# Patient Record
Sex: MALE | Race: OTHER | HISPANIC OR LATINO | URBAN - METROPOLITAN AREA
[De-identification: names, ages, dates, MRNs, and addresses within clinical notes are randomized per-mention and may not be internally consistent; named-entity substitution may affect disease eponyms.]

---

## 2017-09-09 ENCOUNTER — INPATIENT (INPATIENT)
Facility: HOSPITAL | Age: 72
LOS: 15 days | Discharge: HOME HEALTH | DRG: 377 | End: 2017-09-25
Attending: INTERNAL MEDICINE | Admitting: INTERNAL MEDICINE
Payer: MEDICARE

## 2017-09-09 VITALS — OXYGEN SATURATION: 100 % | TEMPERATURE: 98 F | RESPIRATION RATE: 18 BRPM

## 2017-09-09 DIAGNOSIS — D50.0 IRON DEFICIENCY ANEMIA SECONDARY TO BLOOD LOSS (CHRONIC): ICD-10-CM

## 2017-09-09 DIAGNOSIS — E78.5 HYPERLIPIDEMIA, UNSPECIFIED: ICD-10-CM

## 2017-09-09 DIAGNOSIS — R56.9 UNSPECIFIED CONVULSIONS: ICD-10-CM

## 2017-09-09 DIAGNOSIS — E11.9 TYPE 2 DIABETES MELLITUS WITHOUT COMPLICATIONS: ICD-10-CM

## 2017-09-09 DIAGNOSIS — R55 SYNCOPE AND COLLAPSE: ICD-10-CM

## 2017-09-09 DIAGNOSIS — Z29.9 ENCOUNTER FOR PROPHYLACTIC MEASURES, UNSPECIFIED: ICD-10-CM

## 2017-09-09 DIAGNOSIS — I10 ESSENTIAL (PRIMARY) HYPERTENSION: ICD-10-CM

## 2017-09-09 LAB
CK MB BLD-MCNC: 1.4 % — SIGNIFICANT CHANGE UP (ref 0–3.5)
CK MB CFR SERPL CALC: 1.8 NG/ML — SIGNIFICANT CHANGE UP (ref 0–3.6)
CK SERPL-CCNC: 128 U/L — SIGNIFICANT CHANGE UP (ref 35–232)
HCT VFR BLD CALC: 34.3 % — LOW (ref 39–50)
HGB BLD-MCNC: 11.8 G/DL — LOW (ref 13–17)
MCHC RBC-ENTMCNC: 29.2 PG — SIGNIFICANT CHANGE UP (ref 27–34)
MCHC RBC-ENTMCNC: 34.5 GM/DL — SIGNIFICANT CHANGE UP (ref 32–36)
MCV RBC AUTO: 84.7 FL — SIGNIFICANT CHANGE UP (ref 80–100)
PLATELET # BLD AUTO: 264 K/UL — SIGNIFICANT CHANGE UP (ref 150–400)
RBC # BLD: 4.04 M/UL — LOW (ref 4.2–5.8)
RBC # FLD: 12.5 % — SIGNIFICANT CHANGE UP (ref 10.3–14.5)
TROPONIN I SERPL-MCNC: <0.015 NG/ML — SIGNIFICANT CHANGE UP (ref 0–0.04)
WBC # BLD: 11.1 K/UL — HIGH (ref 3.8–10.5)
WBC # FLD AUTO: 11.1 K/UL — HIGH (ref 3.8–10.5)

## 2017-09-09 PROCEDURE — 70450 CT HEAD/BRAIN W/O DYE: CPT | Mod: 26

## 2017-09-09 PROCEDURE — 71020: CPT | Mod: 26

## 2017-09-09 PROCEDURE — 99285 EMERGENCY DEPT VISIT HI MDM: CPT

## 2017-09-09 RX ORDER — PANTOPRAZOLE SODIUM 20 MG/1
40 TABLET, DELAYED RELEASE ORAL
Qty: 0 | Refills: 0 | Status: DISCONTINUED | OUTPATIENT
Start: 2017-09-09 | End: 2017-09-22

## 2017-09-09 RX ORDER — ASPIRIN/CALCIUM CARB/MAGNESIUM 324 MG
81 TABLET ORAL DAILY
Qty: 0 | Refills: 0 | Status: DISCONTINUED | OUTPATIENT
Start: 2017-09-09 | End: 2017-09-10

## 2017-09-09 RX ORDER — ENOXAPARIN SODIUM 100 MG/ML
40 INJECTION SUBCUTANEOUS DAILY
Qty: 0 | Refills: 0 | Status: DISCONTINUED | OUTPATIENT
Start: 2017-09-09 | End: 2017-09-10

## 2017-09-09 RX ORDER — SODIUM CHLORIDE 9 MG/ML
1000 INJECTION INTRAMUSCULAR; INTRAVENOUS; SUBCUTANEOUS
Qty: 0 | Refills: 0 | Status: DISCONTINUED | OUTPATIENT
Start: 2017-09-09 | End: 2017-09-10

## 2017-09-09 RX ORDER — SITAGLIPTIN AND METFORMIN HYDROCHLORIDE 500; 50 MG/1; MG/1
1 TABLET, FILM COATED ORAL
Qty: 0 | Refills: 0 | COMMUNITY

## 2017-09-09 RX ORDER — INSULIN LISPRO 100/ML
VIAL (ML) SUBCUTANEOUS
Qty: 0 | Refills: 0 | Status: DISCONTINUED | OUTPATIENT
Start: 2017-09-09 | End: 2017-09-10

## 2017-09-09 RX ORDER — METFORMIN HYDROCHLORIDE 850 MG/1
1000 TABLET ORAL
Qty: 0 | Refills: 0 | Status: DISCONTINUED | OUTPATIENT
Start: 2017-09-09 | End: 2017-09-10

## 2017-09-09 RX ORDER — SIMVASTATIN 20 MG/1
1 TABLET, FILM COATED ORAL
Qty: 0 | Refills: 0 | COMMUNITY

## 2017-09-09 RX ORDER — ATORVASTATIN CALCIUM 80 MG/1
40 TABLET, FILM COATED ORAL AT BEDTIME
Qty: 0 | Refills: 0 | Status: DISCONTINUED | OUTPATIENT
Start: 2017-09-09 | End: 2017-09-25

## 2017-09-09 RX ADMIN — Medication 10 MILLIGRAM(S): at 18:53

## 2017-09-09 RX ADMIN — ATORVASTATIN CALCIUM 40 MILLIGRAM(S): 80 TABLET, FILM COATED ORAL at 23:25

## 2017-09-09 NOTE — H&P ADULT - ASSESSMENT
71M from home, still works PMH of DM, HTN, Kidney stones, HLD who presents to hospital for syncope, possible seizure while on the way to work and episode of bloody stool incontinence. Patient is being admitted for workup of syncope, rule out of seizure and anemia due to lower GI bleed x2 episodes today

## 2017-09-09 NOTE — H&P ADULT - HISTORY OF PRESENT ILLNESS
71M from home, still works PMH of DM, HTN, Kidney stones, HLD who presents to hospital for syncope, possible seizure while on the way to work and episode of bloody stool incontinence. Patient says he was on his way to work in the morning, on the train going to [employer] and when he was getting off, he suddenly blacked out. As per friend who was with him, he was unconscious for 10 mins and when he woke up, he was very dizzy and had episode of stool incontinence that was very bloody. His friend told him that he had no convulsions, no period of confusion when he woke up, no tongue biding but that he was very sweaty. Patient denies any chest pain before, during or after the event. Friend got him to work, gave him change of clothes and then took him here to the hospital. 71M from home, still works PMH of DM, HTN, Kidney stones, HLD who presents to hospital for syncope, possible seizure while on the way to work and episode of bloody stool incontinence. Patient says he was on his way to work in the morning, on the train going to [employer] and when he was getting off, he suddenly blacked out. As per friend who was with him, he was unconscious for 10 mins and when he woke up, he was very dizzy and had episode of stool incontinence that was very bloody. His friend told him that he had no convulsions, no period of confusion when he woke up, no tongue biting but that he was very sweaty. Patient denies any chest pain before, during or after the event. Friend got him to work, gave him change of clothes and then took him here to the hospital.

## 2017-09-09 NOTE — H&P ADULT - NSHPREVIEWOFSYSTEMS_GEN_ALL_CORE
REVIEW OF SYSTEMS:  CONSTITUTIONAL: No fever, weight loss, or fatigue  EYES: No eye pain, visual disturbances, or discharge  ENMT:  No difficulty hearing, tinnitus, vertigo; No sinus or throat pain  NECK: No pain or stiffness  RESPIRATORY: No cough, wheezing, chills or hemoptysis; No shortness of breath  CARDIOVASCULAR: syncope, dizziness - no chest pain, no palpitations  GASTROINTESTINAL: fecal incontinence with bloody bowel movement  GENITOURINARY: No dysuria, frequency, hematuria, or incontinence  NEUROLOGICAL: No headaches, memory loss, loss of strength, numbness, or tremors  SKIN: No itching, burning, rashes, or lesions   LYMPH NODES: No enlarged glands  ENDOCRINE: No heat or cold intolerance; No hair loss  MUSCULOSKELETAL: No joint pain or swelling; No muscle, back, or extremity pain  PSYCHIATRIC: No depression, anxiety, mood swings, or difficulty sleeping  HEME/LYMPH: No easy bruising, or bleeding gums  ALLERY AND IMMUNOLOGIC: No hives or eczema

## 2017-09-09 NOTE — ED PROVIDER NOTE - MEDICAL DECISION MAKING DETAILS
72 y/o male with syncopal episode. Will check labs, EKG. Possible admission  2/2 recurrent syncopal events.

## 2017-09-09 NOTE — ED PROVIDER NOTE - OBJECTIVE STATEMENT
72 y/o male with a significant PMHx of DM, HTN and HLD presents to ED, BIB daughter s/p syncopal episode with fecal incontinence today. Daughter notes the pt was on his way to work when he fainted, witnessed by friend who is at bedside, denies head injury. Friend notes the pt was diaphoretic but did not appear to be shaking. Daughter states the pt has had syncopal episode once in the past with urinary incontinence. Pt is awake and alert currently while in ED. He denies any CP, SOB, fever or any other complaints at this time.

## 2017-09-09 NOTE — H&P ADULT - PROBLEM SELECTOR PLAN 2
Patient has concern for seizure since patient passed out and had incontinence  -however it would be atypical as patient had no convulsions, no postictal confusion  -will get EEG to evaluation  -no seizure medications for now  -Neuro consult Patient has concern for seizure since patient passed out and had incontinence  -however it would be atypical as patient had no convulsions, no postictal confusion  -will get EEG to evaluation  -no seizure medications for now

## 2017-09-09 NOTE — H&P ADULT - NSHPLABSRESULTS_GEN_ALL_CORE
EKG shows normal sinus rhythm at rate of 69 without ischemic changes in ST or T waves  CT head negative  CXR clear  UA clear  Trop 1 negative  Lytes wnl  Cr 1.18  Initial Hb 13, second repeat was 11.8  Had positive guaiac and bloody bm witnessed by ED

## 2017-09-09 NOTE — H&P ADULT - PROBLEM SELECTOR PLAN 4
Patient is only on oral medications but has elevated glucose on BMP  -will give metformin for now  -give HSS during meals  -consistent carbohydrate meals  -F/u A1c and monitor FS  -adjust insulin and meds as necessary to improve diabetic control if warranted

## 2017-09-09 NOTE — H&P ADULT - PROBLEM SELECTOR PLAN 6
Will change home simvastatin to lipitor for Syncope/seizure rule out  -f/u lipid panel for evaluation of control

## 2017-09-09 NOTE — H&P ADULT - ATTENDING COMMENTS
71 yr old male  h/o htn, dm had an episode of syncope and passsed out for a few mnts, didn't have cp or sob or palp. then he woke up brought to later on had bright red blood from rectum.  Er called me advised ICU but er didn't feel pt is not sick qs hgb was 13 plus.  I came to see him in er he was alert awake not feeling dizzy. mild abd discomfort. later on d/w resident asked check cbc and any orthostasis  call icu.    today pt doing ok. no bm so no rectal bleed. no abd pain.  GI pending. pt is not on NSAID, no etoh.  already has TXC  hgb from 13 to 11

## 2017-09-09 NOTE — ED ADULT NURSE NOTE - OBJECTIVE STATEMENT
pt arrived with family c/o syncope and loss of bowels x today no seizure activity noted , bloody BM noted - MD made aware , labs collected and sent , heplock to LT AC 20G

## 2017-09-09 NOTE — H&P ADULT - PROBLEM SELECTOR PLAN 3
Patient had 2 episodes of what is assumed to be lower GI bleed with bloody stool   -guaiac positive  -T&S tested but currently H/H is over 10 so will not transfuse  -give IV fluids to replete volume  -F/u Anemia panel  -Will need Gi consult and scopes for evaluation of source of bleeding  -monitor H/H and transfuse for Hb < 7 or if symptomatic

## 2017-09-09 NOTE — H&P ADULT - NSHPPHYSICALEXAM_GEN_ALL_CORE
Vital Signs Last 24 Hrs  T(C): 37.1 (09 Sep 2017 16:02), Max: 37.1 (09 Sep 2017 16:02)  T(F): 98.7 (09 Sep 2017 16:02), Max: 98.7 (09 Sep 2017 16:02)  HR: 90 (09 Sep 2017 16:02) (90 - 90)  BP: 118/72 (09 Sep 2017 16:02) (118/72 - 118/72)  BP(mean): --  RR: 18 (09 Sep 2017 16:02) (18 - 18)  SpO2: 100% (09 Sep 2017 16:02) (100% - 100%)    Orthostatics  Lyin/74  Sittin/80  Standin/77    GENERAL: NAD, well-groomed, well-developed  HEAD:  Atraumatic, Normocephalic  EYES: EOMI, PERRLA, conjunctiva and sclera clear  ENMT: No tonsillar erythema, exudates, or enlargement; Moist mucous membranes  NECK: Supple, No JVD, Normal thyroid  NERVOUS SYSTEM:  Alert & Oriented X3, power 5/5 in bilateral upper and low extremities, CN2-12 intact bilaterally  CHEST/LUNG: Clear to auscultation bilaterally; No rales, rhonchi, wheezing, or rubs  HEART: Regular rate and rhythm; No murmurs, rubs, or gallops  ABDOMEN: Soft, Nontender, Nondistended; Bowel sounds present  EXTREMITIES:  2+ Peripheral Pulses, No clubbing, cyanosis, or edema  LYMPH: No lymphadenopathy noted  SKIN: No rashes or lesions

## 2017-09-09 NOTE — H&P ADULT - PROBLEM SELECTOR PLAN 1
Patient had syncope and collapsed for 10 mins - unknown source at the moment  -Orthostatics normal  -possibilities are Vasovagal, arrhythmia mediated, Seizure, or neurological event (TIA vs CVA)  -neuro is unlikely as patient has pristine neurological exam and doesn't have confusion  -will keep on tele to monitor for arrhythmias  -Will get Echocardiogram to evaluate for LV function  -Seizure workup as per below  -Cardio consult  -will give aspirin and lipitor for now  -trend cardiac enzymes Patient had syncope and collapsed for 10 mins - unknown source at the moment  -Orthostatics normal  -possibilities are Vasovagal, arrhythmia mediated, Seizure, or neurological event (TIA vs CVA)  -neuro is unlikely as patient has pristine neurological exam and doesn't have confusion - will get MRI/MRA for evaluation  -will keep on tele to monitor for arrhythmias  -Will get Echocardiogram to evaluate for LV function  -Seizure workup as per below  -Cardio consult  -will give aspirin and lipitor for now  -trend cardiac enzymes

## 2017-09-10 DIAGNOSIS — E11.9 TYPE 2 DIABETES MELLITUS WITHOUT COMPLICATIONS: ICD-10-CM

## 2017-09-10 DIAGNOSIS — R57.1 HYPOVOLEMIC SHOCK: ICD-10-CM

## 2017-09-10 DIAGNOSIS — K92.2 GASTROINTESTINAL HEMORRHAGE, UNSPECIFIED: ICD-10-CM

## 2017-09-10 LAB
ANION GAP SERPL CALC-SCNC: 6 MMOL/L — SIGNIFICANT CHANGE UP (ref 5–17)
ANION GAP SERPL CALC-SCNC: 8 MMOL/L — SIGNIFICANT CHANGE UP (ref 5–17)
APTT BLD: 24.9 SEC — LOW (ref 27.5–37.4)
BUN SERPL-MCNC: 16 MG/DL — SIGNIFICANT CHANGE UP (ref 7–18)
BUN SERPL-MCNC: 19 MG/DL — HIGH (ref 7–18)
CALCIUM SERPL-MCNC: 8.4 MG/DL — SIGNIFICANT CHANGE UP (ref 8.4–10.5)
CALCIUM SERPL-MCNC: 8.7 MG/DL — SIGNIFICANT CHANGE UP (ref 8.4–10.5)
CHLORIDE SERPL-SCNC: 106 MMOL/L — SIGNIFICANT CHANGE UP (ref 96–108)
CHLORIDE SERPL-SCNC: 108 MMOL/L — SIGNIFICANT CHANGE UP (ref 96–108)
CHOLEST SERPL-MCNC: 105 MG/DL — SIGNIFICANT CHANGE UP (ref 10–199)
CK MB BLD-MCNC: <0.9 % — SIGNIFICANT CHANGE UP (ref 0–3.5)
CK MB BLD-MCNC: <1.1 % — SIGNIFICANT CHANGE UP (ref 0–3.5)
CK MB CFR SERPL CALC: <1 NG/ML — SIGNIFICANT CHANGE UP (ref 0–3.6)
CK MB CFR SERPL CALC: <1 NG/ML — SIGNIFICANT CHANGE UP (ref 0–3.6)
CK SERPL-CCNC: 116 U/L — SIGNIFICANT CHANGE UP (ref 35–232)
CK SERPL-CCNC: 92 U/L — SIGNIFICANT CHANGE UP (ref 35–232)
CO2 SERPL-SCNC: 23 MMOL/L — SIGNIFICANT CHANGE UP (ref 22–31)
CO2 SERPL-SCNC: 27 MMOL/L — SIGNIFICANT CHANGE UP (ref 22–31)
CREAT SERPL-MCNC: 1.02 MG/DL — SIGNIFICANT CHANGE UP (ref 0.5–1.3)
CREAT SERPL-MCNC: 1.15 MG/DL — SIGNIFICANT CHANGE UP (ref 0.5–1.3)
FERRITIN SERPL-MCNC: 76 NG/ML — SIGNIFICANT CHANGE UP (ref 30–400)
FOLATE SERPL-MCNC: 11.4 NG/ML — SIGNIFICANT CHANGE UP (ref 4.8–24.2)
GLUCOSE SERPL-MCNC: 158 MG/DL — HIGH (ref 70–99)
GLUCOSE SERPL-MCNC: 166 MG/DL — HIGH (ref 70–99)
HBA1C BLD-MCNC: 7 % — HIGH (ref 4–5.6)
HCT VFR BLD CALC: 29.5 % — LOW (ref 39–50)
HCT VFR BLD CALC: 30.2 % — LOW (ref 39–50)
HCT VFR BLD CALC: 30.9 % — LOW (ref 39–50)
HDLC SERPL-MCNC: 31 MG/DL — LOW (ref 40–125)
HGB BLD-MCNC: 10.3 G/DL — LOW (ref 13–17)
HGB BLD-MCNC: 10.7 G/DL — LOW (ref 13–17)
HGB BLD-MCNC: 10.8 G/DL — LOW (ref 13–17)
INR BLD: 1.22 RATIO — HIGH (ref 0.88–1.16)
IRON SATN MFR SERPL: 31 % — SIGNIFICANT CHANGE UP (ref 20–55)
IRON SATN MFR SERPL: 87 UG/DL — SIGNIFICANT CHANGE UP (ref 65–170)
LACTATE SERPL-SCNC: 4 MMOL/L — CRITICAL HIGH (ref 0.7–2)
LDH SERPL L TO P-CCNC: 108 U/L — LOW (ref 120–225)
LIPID PNL WITH DIRECT LDL SERPL: 47 MG/DL — SIGNIFICANT CHANGE UP
MAGNESIUM SERPL-MCNC: 1.9 MG/DL — SIGNIFICANT CHANGE UP (ref 1.6–2.6)
MCHC RBC-ENTMCNC: 29.9 PG — SIGNIFICANT CHANGE UP (ref 27–34)
MCHC RBC-ENTMCNC: 30.3 PG — SIGNIFICANT CHANGE UP (ref 27–34)
MCHC RBC-ENTMCNC: 30.8 PG — SIGNIFICANT CHANGE UP (ref 27–34)
MCHC RBC-ENTMCNC: 35 GM/DL — SIGNIFICANT CHANGE UP (ref 32–36)
MCHC RBC-ENTMCNC: 35 GM/DL — SIGNIFICANT CHANGE UP (ref 32–36)
MCHC RBC-ENTMCNC: 35.3 GM/DL — SIGNIFICANT CHANGE UP (ref 32–36)
MCV RBC AUTO: 85.6 FL — SIGNIFICANT CHANGE UP (ref 80–100)
MCV RBC AUTO: 86.5 FL — SIGNIFICANT CHANGE UP (ref 80–100)
MCV RBC AUTO: 87.2 FL — SIGNIFICANT CHANGE UP (ref 80–100)
PHOSPHATE SERPL-MCNC: 2.5 MG/DL — SIGNIFICANT CHANGE UP (ref 2.5–4.5)
PLATELET # BLD AUTO: 232 K/UL — SIGNIFICANT CHANGE UP (ref 150–400)
PLATELET # BLD AUTO: 235 K/UL — SIGNIFICANT CHANGE UP (ref 150–400)
PLATELET # BLD AUTO: 249 K/UL — SIGNIFICANT CHANGE UP (ref 150–400)
POTASSIUM SERPL-MCNC: 3.6 MMOL/L — SIGNIFICANT CHANGE UP (ref 3.5–5.3)
POTASSIUM SERPL-MCNC: 4.1 MMOL/L — SIGNIFICANT CHANGE UP (ref 3.5–5.3)
POTASSIUM SERPL-SCNC: 3.6 MMOL/L — SIGNIFICANT CHANGE UP (ref 3.5–5.3)
POTASSIUM SERPL-SCNC: 4.1 MMOL/L — SIGNIFICANT CHANGE UP (ref 3.5–5.3)
PROTHROM AB SERPL-ACNC: 13.4 SEC — HIGH (ref 9.8–12.7)
RBC # BLD: 3.43 M/UL — LOW (ref 4.2–5.8)
RBC # BLD: 3.45 M/UL — LOW (ref 4.2–5.8)
RBC # BLD: 3.46 M/UL — LOW (ref 4.2–5.8)
RBC # BLD: 3.57 M/UL — LOW (ref 4.2–5.8)
RBC # FLD: 12.2 % — SIGNIFICANT CHANGE UP (ref 10.3–14.5)
RBC # FLD: 12.4 % — SIGNIFICANT CHANGE UP (ref 10.3–14.5)
RBC # FLD: 12.4 % — SIGNIFICANT CHANGE UP (ref 10.3–14.5)
RETICS #: 54.4 K/UL — SIGNIFICANT CHANGE UP (ref 25–125)
RETICS/RBC NFR: 1.6 % — SIGNIFICANT CHANGE UP (ref 0.5–2.5)
SODIUM SERPL-SCNC: 139 MMOL/L — SIGNIFICANT CHANGE UP (ref 135–145)
SODIUM SERPL-SCNC: 139 MMOL/L — SIGNIFICANT CHANGE UP (ref 135–145)
TIBC SERPL-MCNC: 279 UG/DL — SIGNIFICANT CHANGE UP (ref 250–450)
TOTAL CHOLESTEROL/HDL RATIO MEASUREMENT: 3.4 RATIO — SIGNIFICANT CHANGE UP (ref 3.4–9.6)
TRIGL SERPL-MCNC: 135 MG/DL — SIGNIFICANT CHANGE UP (ref 10–149)
TROPONIN I SERPL-MCNC: <0.015 NG/ML — SIGNIFICANT CHANGE UP (ref 0–0.04)
TROPONIN I SERPL-MCNC: <0.015 NG/ML — SIGNIFICANT CHANGE UP (ref 0–0.04)
TSH SERPL-MCNC: 0.49 UU/ML — SIGNIFICANT CHANGE UP (ref 0.34–4.82)
UIBC SERPL-MCNC: 192 UG/DL — SIGNIFICANT CHANGE UP (ref 110–370)
VIT B12 SERPL-MCNC: 385 PG/ML — SIGNIFICANT CHANGE UP (ref 243–894)
WBC # BLD: 10.7 K/UL — HIGH (ref 3.8–10.5)
WBC # BLD: 9.9 K/UL — SIGNIFICANT CHANGE UP (ref 3.8–10.5)
WBC # BLD: 9.9 K/UL — SIGNIFICANT CHANGE UP (ref 3.8–10.5)
WBC # FLD AUTO: 10.7 K/UL — HIGH (ref 3.8–10.5)
WBC # FLD AUTO: 9.9 K/UL — SIGNIFICANT CHANGE UP (ref 3.8–10.5)
WBC # FLD AUTO: 9.9 K/UL — SIGNIFICANT CHANGE UP (ref 3.8–10.5)

## 2017-09-10 PROCEDURE — 99291 CRITICAL CARE FIRST HOUR: CPT

## 2017-09-10 PROCEDURE — 71010: CPT | Mod: 26

## 2017-09-10 RX ORDER — SODIUM CHLORIDE 9 MG/ML
1000 INJECTION, SOLUTION INTRAVENOUS
Qty: 0 | Refills: 0 | Status: DISCONTINUED | OUTPATIENT
Start: 2017-09-10 | End: 2017-09-12

## 2017-09-10 RX ORDER — SODIUM CHLORIDE 9 MG/ML
1000 INJECTION INTRAMUSCULAR; INTRAVENOUS; SUBCUTANEOUS
Qty: 0 | Refills: 0 | Status: DISCONTINUED | OUTPATIENT
Start: 2017-09-10 | End: 2017-09-10

## 2017-09-10 RX ORDER — SODIUM CHLORIDE 9 MG/ML
500 INJECTION INTRAMUSCULAR; INTRAVENOUS; SUBCUTANEOUS ONCE
Qty: 0 | Refills: 0 | Status: COMPLETED | OUTPATIENT
Start: 2017-09-10 | End: 2017-09-10

## 2017-09-10 RX ORDER — ONDANSETRON 8 MG/1
8 TABLET, FILM COATED ORAL ONCE
Qty: 0 | Refills: 0 | Status: COMPLETED | OUTPATIENT
Start: 2017-09-10 | End: 2017-09-10

## 2017-09-10 RX ORDER — INSULIN LISPRO 100/ML
VIAL (ML) SUBCUTANEOUS EVERY 6 HOURS
Qty: 0 | Refills: 0 | Status: DISCONTINUED | OUTPATIENT
Start: 2017-09-10 | End: 2017-09-23

## 2017-09-10 RX ADMIN — ENOXAPARIN SODIUM 40 MILLIGRAM(S): 100 INJECTION SUBCUTANEOUS at 11:40

## 2017-09-10 RX ADMIN — SODIUM CHLORIDE 2000 MILLILITER(S): 9 INJECTION INTRAMUSCULAR; INTRAVENOUS; SUBCUTANEOUS at 20:28

## 2017-09-10 RX ADMIN — Medication 81 MILLIGRAM(S): at 11:40

## 2017-09-10 RX ADMIN — Medication 10 MILLIGRAM(S): at 06:23

## 2017-09-10 RX ADMIN — Medication 1: at 09:24

## 2017-09-10 RX ADMIN — ONDANSETRON 8 MILLIGRAM(S): 8 TABLET, FILM COATED ORAL at 20:28

## 2017-09-10 RX ADMIN — SODIUM CHLORIDE 100 MILLILITER(S): 9 INJECTION INTRAMUSCULAR; INTRAVENOUS; SUBCUTANEOUS at 21:34

## 2017-09-10 RX ADMIN — PANTOPRAZOLE SODIUM 40 MILLIGRAM(S): 20 TABLET, DELAYED RELEASE ORAL at 06:21

## 2017-09-10 RX ADMIN — METFORMIN HYDROCHLORIDE 1000 MILLIGRAM(S): 850 TABLET ORAL at 09:25

## 2017-09-10 RX ADMIN — Medication 1: at 13:04

## 2017-09-10 RX ADMIN — SODIUM CHLORIDE 100 MILLILITER(S): 9 INJECTION, SOLUTION INTRAVENOUS at 23:23

## 2017-09-10 RX ADMIN — Medication 10 MILLIGRAM(S): at 17:28

## 2017-09-10 RX ADMIN — PANTOPRAZOLE SODIUM 40 MILLIGRAM(S): 20 TABLET, DELAYED RELEASE ORAL at 17:28

## 2017-09-10 NOTE — CHART NOTE - NSCHARTNOTEFT_GEN_A_CORE
RRT was called on pt at 8:25 pm today as he had bright red bloody bowel movement and syncopized in the bathroom. Pt was held by family member and hospital staff so no fall or trauma occurred. Pt seen and examined. He looked pale and diaphoretic. T-99, /66, HR 80, RR 16, SpO2 98% on RA. 2 litres NS bolus was given right away and pt is AAOX3 and responded in few seconds and was transferred to bed. He denies any abd pain or other symptoms. Repeat CBC was 10.3 dropped from 10.8 in AM. GI Dr. Villavicencio and Attending were notified about RRT. Attending requested for ICU consult which was consulted. Blood transfusion consent and all labs were obtained. Discussed plan with family and pt at bedside. Will monitor CBC and vitals. Pt is stable for now. RRT was called on pt at 8:25 pm today as he had bright red bloody bowel movement and syncopized in the bathroom. Pt was held by family member and hospital staff so no fall or trauma occurred. Pt seen and examined. He looked pale and diaphoretic. T-99, /66, HR 80, RR 16, SpO2 98% on RA. 2 litres NS bolus was given right away and pt is AAOX3 and responded in few seconds and was transferred to bed. He denies any abd pain or other symptoms. Repeat CBC was 10.3 dropped from 10.8 in AM. GI Dr. Villavicencio and Attending were notified about RRT. Attending requested for ICU consult which was consulted. Blood transfusion consent and all labs were obtained. Discussed plan with family and pt at bedside. Will monitor CBC and vitals. Pt is stable for now. Will f/u CT Abd/pelvis. RRT was called on pt at 8:25 pm today as he had bright red bloody bowel movement and syncopized in the bathroom. Pt was held by family member and hospital staff so no fall or trauma occurred. Pt seen and examined. He looked pale and diaphoretic likely vasovagal syncope as pt having bowel movement or sec to Lower GI bleed. T-99, /66, HR 80, RR 16, SpO2 98% on RA. 2 litres NS bolus was given right away and pt is AAOX3 and responded in few seconds and was transferred to bed. He denies any abd pain or other symptoms. Repeat CBC was 10.3 dropped from 10.8 in AM. GI Dr. Villavicencio and Attending were notified about RRT. Attending requested for ICU consult which was consulted. Blood transfusion consent and all labs were obtained. Discussed plan with family and pt at bedside. Will monitor CBC and vitals. Pt is stable for now. Will f/u CT Abd/pelvis. Will need Colonoscopy and/ EGD. RRT was called on pt at 8:25 pm today as he had bright red bloody bowel movement and syncopized in the bathroom. Pt was held by family member and hospital staff so no fall or trauma occurred. Pt seen and examined. He looked pale and diaphoretic likely vasovagal syncope as pt having bowel movement or sec to Lower GI bleed. T-99, /66, HR 80, RR 16, SpO2 98% on RA. 2 litres NS bolus was given right away and pt is AAOX3 and responded in few seconds and was transferred to bed. He denies any abd pain or other symptoms. Repeat CBC was 10.3 dropped from 10.8 in AM. GI Dr. Villavicencio and Attending were notified about RRT. Attending requested for ICU consult which was consulted. Blood transfusion consent and all labs were obtained. Discussed plan with family and pt at bedside. Will monitor CBC and vitals. Pt is stable for now. Will f/u CT Abd/pelvis. Will need Colonoscopy and/ EGD.    Addendum - Pt had another RRT at 9:45 pm for similar episode of bright red bloody bowel movement and hypotension to 80s, 1 litre bolus which is 3rd litre running and 1 unit pRBC ordered. ICU accepted the pt as he is hemodynamically unstable sec to active Lower GI bleed. Attending and GI made aware.

## 2017-09-10 NOTE — CHART NOTE - NSCHARTNOTEFT_GEN_A_CORE
Spoke to the surgery house officer ( Dr Adamson) about the lower GI Bleed, recommended that patient needs the Bleeding scan , if positive the needs the CT Angiogram. Spoke to the surgery house officer ( Dr Adamson) about the lower GI Bleed, recommended that patient needs the Bleeding scan , if positive then needs the CT Angiogram.

## 2017-09-10 NOTE — CONSULT NOTE ADULT - PROBLEM SELECTOR RECOMMENDATION 9
Admitted to ICU  Fluid Resuscitation  Blood transfusion as needed  Repeat CBC serially  GI Consult  Discussed with Dr Stratton  Obtain CT Angio

## 2017-09-10 NOTE — CONSULT NOTE ADULT - PROBLEM SELECTOR PROBLEM 2
Syncope and collapse Type 2 diabetes mellitus without complication, unspecified long term insulin use status

## 2017-09-10 NOTE — CONSULT NOTE ADULT - PROBLEM SELECTOR PROBLEM 3
Type 2 diabetes mellitus without complication, unspecified long term insulin use status Need for prophylactic measure

## 2017-09-10 NOTE — CONSULT NOTE ADULT - PROBLEM SELECTOR RECOMMENDATION 9
Pt has Hemorrhagic Shock and Syncope with hypotension sec to Active Lower GI bleed from Diverticulosis?  s/p 2 RRTS today for the same  H/H 10.3/29.5 dropped from 13 on admission and 10.8 this AM  s/p 3 lt NS boluses, and BP improved from 80s to 110s now.  Will repeat CBC q6  Got 2 peripheral IV lines  Giving 1 unit pRBC, transfuse more as needed based on CBC  GI Dr. Gill consulted, also consulted GI Dr. Montague as he is on call tonight as per Dr. Gill.  Will keep on Protonix iv bid  NPO except meds  Will need Colonoscopy and/EGD  holding ASA  f/u CT abd/pelvis to look for source bleed  ICU accepted the pt as pt is hemodynamically unstable  Central line consent taken which will be placed  Discussed plan with pt s family, Intensivist Pt has Hemorrhagic Shock and Syncope with hypotension sec to Active Lower GI bleed from Diverticulosis?  s/p 2 RRTS today for the same  H/H 10.3/29.5 dropped from 13 on admission and 10.8 this AM  s/p 3 lt NS boluses, and BP improved from 80s to 110s now.  Will repeat CBC q6  Got 2 peripheral IV lines  Giving 1 unit pRBC, transfuse more as needed based on CBC  GI Dr. Gill consulted, also consulted GI Dr. Montague as he is on call tonight as per Dr. Gill. Dr. Montague advised CTA abd/pelvis and Surgery consult.  Will keep on Protonix iv bid  NPO except meds  Will need Colonoscopy and/EGD  holding ASA  f/u CT abd/pelvis to look for source bleed  ICU accepted the pt as pt is hemodynamically unstable  Central line consent taken which will be placed  Consulted surgery- advised to get CTA Abd and Bleeding scan  Discussed plan with pt s family, Intensivist Pt has Hemorrhagic Shock and Syncope with hypotension sec to Active Lower GI bleed from Diverticulosis?  s/p 2 RRTs today for the same  H/H 10.3/29.5 dropped from 13 on admission and 10.8 this AM  s/p 3 lt NS boluses, and BP improved from 80s to 110s now.  Will repeat CBC q6  Lactate 4, f/u repeat lactate  Got 2 peripheral IV lines  Giving 1 unit pRBC, transfuse more as needed based on CBC  GI Dr. Gill consulted, also consulted GI Dr. Montague as he is on call tonight as per Dr. Gill. Dr. Montague advised CTA abd/pelvis and Surgery consult.  Will keep on Protonix iv bid  NPO and D5 NS @ 100cc/hr for hydration  Will need Colonoscopy and/EGD  holding ASA  f/u CT abd/pelvis to look for source bleed  ICU accepted the pt as pt is hemodynamically unstable  Central line consent taken which will be placed  Consulted surgery- advised to get CTA Abd and Bleeding scan  Discussed plan with pt s family, Intensivist

## 2017-09-10 NOTE — CONSULT NOTE ADULT - SUBJECTIVE AND OBJECTIVE BOX
71M from home, still works PMH of DM, HTN, Kidney stones, HLD who presents to hospital for syncope, possible seizure while on the way to work and episode of bloody stool incontinence. Patient says he was on his way to work in the morning, on the train going to [employer] and when he was getting off, he suddenly blacked out. As per friend who was with him, he was unconscious for 10 mins and when he woke up, he was very dizzy and had episode of stool incontinence that was very bloody. His friend told him that he had no convulsions, no period of confusion when he woke up, no tongue biting but that he was very sweaty. Patient denies any chest pain before, during or after the event. Friend got him to work, gave him change of clothes and then took him here to the hospital. (09 Sep 2017 17:45)      BRIEF HOSPITAL COURSE:     RRT was called on pt at 8:25 pm today as he had bright red bloody bowel movement and syncopized in the bathroom. Pt was held by family member and hospital staff so no fall or trauma occurred. Pt seen and examined. He looked pale and diaphoretic likely vasovagal syncope as pt having bowel movement or sec to Lower GI bleed. T-99, /66, HR 80, RR 16, SpO2 98% on RA. 2 litres NS bolus was given right away and pt is AAOX3 and responded in few seconds and was transferred to bed. He denies any abd pain or other symptoms. Repeat CBC was 10.3 dropped from 10.8 in AM and on admission it was 13. GI Dr. Villavicencio and Attending were notified about RRT. Attending requested for ICU consult which was consulted. Blood transfusion consent and all labs were obtained. Discussed plan with family and pt at bedside. Will monitor CBC and vitals. Pt is stable for now. Will f/u CT Abd/pelvis. Will need Colonoscopy and/ EGD.    Addendum - Pt had another RRT at 9:45 pm for similar episode of bright red bloody bowel movement and hypotension to 80s, 1 litre bolus which is 3rd litre running and 1 unit pRBC ordered. ICU accepted the pt as he is hemodynamically unstable sec to active Lower GI bleed. Attending and GI made aware.    	    PAST MEDICAL & SURGICAL HISTORY:  Kidney stones  Hyperlipidemia  Hypertension  Diabetes  No significant past surgical history      MEDICATIONS  (STANDING):  atorvastatin 40 milliGRAM(s) Oral at bedtime  pantoprazole  Injectable 40 milliGRAM(s) IV Push two times a day  dextrose 5% + sodium chloride 0.9%. 1000 milliLiter(s) (100 mL/Hr) IV Continuous <Continuous>  insulin lispro (HumaLOG) corrective regimen sliding scale   SubCutaneous every 6 hours    MEDICATIONS  (PRN):      Allergies    No Known Allergies    Intolerances        SOCIAL HISTORY:  No drug use    FAMILY HISTORY:  No pertinent family history in first degree relatives      REVIEW OF SYSTEMS:  CONSTITUTIONAL: In no acute distress.  EYES: No eye pain, visual disturbances, or discharge  ENMT:  No difficulty hearing, tinnitus, vertigo; No sinus or throat pain  NECK: No pain or stiffness  BREASTS: No pain, masses, or nipple discharge  RESPIRATORY: No cough, wheezing, chills or hemoptysis; No shortness of breath  CARDIOVASCULAR: No chest pain, palpitations, dizziness, or leg swelling  GASTROINTESTINAL: No abdominal or epigastric pain. No nausea, vomiting, or hematemesis; No diarrhea or constipation. No melena or hematochezia.  GENITOURINARY: No dysuria, frequency, hematuria, or incontinence  NEUROLOGICAL: No headaches, memory loss, loss of strength, numbness, or tremors  SKIN: No itching, burning, rashes, or lesions   LYMPH NODES: No enlarged glands  ENDOCRINE: No heat or cold intolerance; No hair loss  MUSCULOSKELETAL: No joint pain or swelling; No muscle, back, or extremity pain  PSYCHIATRIC: No depression, anxiety, mood swings, or difficulty sleeping  HEME/LYMPH: No easy bruising, or bleeding gums  ALLERGY AND IMMUNOLOGIC: No hives or eczema      Vital Signs Last 24 Hrs  T(C): 36.8 (10 Sep 2017 15:20), Max: 37.1 (10 Sep 2017 12:02)  T(F): 98.2 (10 Sep 2017 15:20), Max: 98.8 (10 Sep 2017 12:02)  HR: 89 (10 Sep 2017 23:30) (70 - 110)  BP: 109/65 (10 Sep 2017 23:30) (98/57 - 129/76)  BP(mean): 75 (10 Sep 2017 23:30) (73 - 79)  RR: 20 (10 Sep 2017 23:30) (16 - 24)  SpO2: 100% (10 Sep 2017 23:30) (98% - 100%)    Daily     Daily     PHYSICAL EXAM:  GENERAL: NAD, well-groomed, well-developed  HEAD:  Atraumatic, Normocephalic  EYES: EOMI, PERRL, conjunctiva and sclera clear  ENMT: Moist mucous membranes, Good dentition, No lesions  NECK: Supple, No JVD  NERVOUS SYSTEM:  Alert & Oriented X3, Good concentration  CHEST/LUNG: Clear to percussion bilaterally; Normal respiratory effort  HEART: Regular rate and rhythm  ABDOMEN: Soft, Nontender, Nondistended; Bowel sounds present  : normal external genitalia  BREASTS: no breast lumps  EXTREMITIES:  No clubbing, cyanosis, or edema  VASC: equal peripheral pulses  LYMPH: No lymphadenopathy noted  SKIN: No rashes or lesions  PSYCH: normal affect    LABS:                        10.3   9.9   )-----------( 249      ( 10 Sep 2017 20:22 )             29.5     Neutrophil %:   -10    139  |  108  |  16  ----------------------------<  166<H>  3.6   |  23  |  1.15    Ca    8.4      10 Sep 2017 20:46  Phos  2.5     -10  Mg     1.9     -10    Lactate 4.0    TPro  7.3  /  Alb  3.7  /  TBili  0.4  /  DBili  x   /  AST  22  /  ALT  29  /  AlkPhos  81  09-    PT/INR - ( 10 Sep 2017 20:51 )   PT: 13.4 sec;   INR: 1.22 ratio         PTT - ( 10 Sep 2017 20:51 )  PTT:24.9 sec  Urinalysis Basic - ( 09 Sep 2017 10:04 )    Color: Yellow / Appearance: Clear / S.025 / pH: x  Gluc: x / Ketone: Trace  / Bili: Negative / Urobili: Negative   Blood: x / Protein: 15 / Nitrite: Negative   Leuk Esterase: Negative / RBC: 0-2 /HPF / WBC 0-2 /HPF   Sq Epi: x / Non Sq Epi: x / Bacteria: Trace /HPF        RADIOLOGY & ADDITIONAL STUDIES:

## 2017-09-10 NOTE — CONSULT NOTE ADULT - SUBJECTIVE AND OBJECTIVE BOX
Patient is a 71y old  Male who presents with a chief complaint of Passing out, fecal incontinence, blood in stool (09 Sep 2017 17:45)      Initial HPI on admission:  HPI:  71M from home, still works PMH of DM, HTN, Kidney stones, HLD who presents to hospital for syncope, possible seizure while on the way to work and episode of bloody stool incontinence. Patient says he was on his way to work in the morning, on the train going to [employer] and when he was getting off, he suddenly blacked out. As per friend who was with him, he was unconscious for 10 mins and when he woke up, he was very dizzy and had episode of stool incontinence that was very bloody. His friend told him that he had no convulsions, no period of confusion when he woke up, no tongue biting but that he was very sweaty. Patient denies any chest pain before, during or after the event. Friend got him to work, gave him change of clothes and then took him here to the hospital. (09 Sep 2017 17:45)      BRIEF HOSPITAL COURSE:     RRT was called on pt at 8:25 pm today as he had bright red bloody bowel movement and syncopized in the bathroom. Pt was held by family member and hospital staff so no fall or trauma occurred. Pt seen and examined. He looked pale and diaphoretic likely vasovagal syncope as pt having bowel movement or sec to Lower GI bleed. T-99, /66, HR 80, RR 16, SpO2 98% on RA. 2 litres NS bolus was given right away and pt is AAOX3 and responded in few seconds and was transferred to bed. He denies any abd pain or other symptoms. Repeat CBC was 10.3 dropped from 10.8 in AM and on admission it was 13. GI Dr. Villavicencio and Attending were notified about RRT. Attending requested for ICU consult which was consulted. Blood transfusion consent and all labs were obtained. Discussed plan with family and pt at bedside. Will monitor CBC and vitals. Pt is stable for now. Will f/u CT Abd/pelvis. Will need Colonoscopy and/ EGD.    Addendum - Pt had another RRT at 9:45 pm for similar episode of bright red bloody bowel movement and hypotension to 80s, 1 litre bolus which is 3rd litre running and 1 unit pRBC ordered. ICU accepted the pt as he is hemodynamically unstable sec to active Lower GI bleed. Attending and GI made aware. RRT was called on pt at 8:25 pm today as he had bright red bloody bowel movement and syncopized in the bathroom. Pt was held by family member and hospital staff so no fall or trauma occurred. Pt seen and examined. He looked pale and diaphoretic likely vasovagal syncope as pt having bowel movement or sec to Lower GI bleed. T-99, /66, HR 80, RR 16, SpO2 98% on RA. 2 litres NS bolus was given right away and pt is AAOX3 and responded in few seconds and was transferred to bed. He denies any abd pain or other symptoms. Repeat CBC was 10.3 dropped from 10.8 in AM. GI Dr. Villavicencio and Attending were notified about RRT. Attending requested for ICU consult which was consulted. Blood transfusion consent and all labs were obtained. Discussed plan with family and pt at bedside. Will monitor CBC and vitals. Pt is stable for now. Will f/u CT Abd/pelvis. Will need Colonoscopy and/ EGD.      Review of Systems:  CONSTITUTIONAL: No fever, chills, or fatigue  EYES: No eye pain, visual disturbances, or discharge  ENMT:  No difficulty hearing, tinnitus, vertigo; No sinus or throat pain  NECK: No pain or stiffness  RESPIRATORY: No cough, wheezing, chills or hemoptysis; No shortness of breath  CARDIOVASCULAR: No chest pain, palpitations, dizziness, or leg swelling  GASTROINTESTINAL: No abdominal or epigastric pain. No nausea, vomiting, or hematemesis; No diarrhea or constipation. No melena or hematochezia.  GENITOURINARY: No dysuria, frequency, hematuria, or incontinence  NEUROLOGICAL: No headaches, memory loss, loss of strength, numbness, or tremors  SKIN: No itching, burning, rashes, or lesions   MUSCULOSKELETAL: No joint pain or swelling; No muscle, back, or extremity pain  PSYCHIATRIC: No depression, anxiety, mood swings, or difficulty sleeping    PAST MEDICAL & SURGICAL HISTORY:  Kidney stones  Hyperlipidemia  Hypertension  Diabetes  No significant past surgical history    Allergies    No Known Allergies    Intolerances      FAMILY HISTORY:  No pertinent family history in first degree relatives    Home Medication: ****  Social history : ***        Medications:  insulin lispro (HumaLOG) corrective regimen sliding scale   SubCutaneous three times a day before meals  atorvastatin 40 milliGRAM(s) Oral at bedtime  pantoprazole  Injectable 40 milliGRAM(s) IV Push two times a day  sodium chloride 0.9%. 1000 milliLiter(s) IV Continuous <Continuous>      O2 supplementation/vent settings      Vital Signs Last 24 Hrs  T(C): 36.8 (10 Sep 2017 15:20), Max: 37.1 (10 Sep 2017 12:02)  T(F): 98.2 (10 Sep 2017 15:20), Max: 98.8 (10 Sep 2017 12:02)  HR: 70 (10 Sep 2017 15:20) (70 - 97)  BP: 129/76 (10 Sep 2017 15:20) (98/57 - 129/76)  BP(mean): --  RR: 16 (10 Sep 2017 15:20) (16 - 17)  SpO2: 99% (10 Sep 2017 15:20) (98% - 100%)              LABS:                        10.3   9.9   )-----------( 249      ( 10 Sep 2017 20:22 )             29.5     09-10    139  |  108  |  16  ----------------------------<  166<H>  3.6   |  23  |  1.15    Ca    8.4      10 Sep 2017 20:46  Phos  2.5     09-10  Mg     1.9     -10    TPro  7.3  /  Alb  3.7  /  TBili  0.4  /  DBili  x   /  AST  22  /  ALT  29  /  AlkPhos  81  09-09      CARDIAC MARKERS ( 10 Sep 2017 20:47 )  <0.015 ng/mL / x     / 92 U/L / x     / <1.0 ng/mL  CARDIAC MARKERS ( 10 Sep 2017 00:33 )  <0.015 ng/mL / x     / 116 U/L / x     / <1.0 ng/mL  CARDIAC MARKERS ( 09 Sep 2017 19:28 )  <0.015 ng/mL / x     / 128 U/L / x     / 1.8 ng/mL  CARDIAC MARKERS ( 09 Sep 2017 09:37 )  <0.015 ng/mL / x     / x     / x     / x          CAPILLARY BLOOD GLUCOSE  105 (10 Sep 2017 16:29)        PT/INR - ( 10 Sep 2017 20:51 )   PT: 13.4 sec;   INR: 1.22 ratio         PTT - ( 10 Sep 2017 20:51 )  PTT:24.9 sec  Urinalysis Basic - ( 09 Sep 2017 10:04 )    Color: Yellow / Appearance: Clear / S.025 / pH: x  Gluc: x / Ketone: Trace  / Bili: Negative / Urobili: Negative   Blood: x / Protein: 15 / Nitrite: Negative   Leuk Esterase: Negative / RBC: 0-2 /HPF / WBC 0-2 /HPF   Sq Epi: x / Non Sq Epi: x / Bacteria: Trace /HPF      CULTURES:        Physical Examination:    General: No acute distress.      HEENT: Pupils equal, reactive to light.  Symmetric.    PULM: Clear to auscultation bilaterally, no significant sputum production    CVS: Regular rate and rhythm, no murmurs, rubs, or gallops    ABD: Soft, nondistended, nontender, normoactive bowel sounds, no masses    EXT: No edema, nontender    SKIN: Warm and well perfused, no rashes noted.    NEURO: Alert, oriented, interactive, nonfocal    RADIOLOGY  ***    EKG *** Patient is a 71y old  Male who presents with a chief complaint of Passing out, fecal incontinence, blood in stool (09 Sep 2017 17:45)      Initial HPI on admission:  HPI:  71M from home, still works PMH of DM, HTN, Kidney stones, HLD who presents to hospital for syncope, possible seizure while on the way to work and episode of bloody stool incontinence. Patient says he was on his way to work in the morning, on the train going to [employer] and when he was getting off, he suddenly blacked out. As per friend who was with him, he was unconscious for 10 mins and when he woke up, he was very dizzy and had episode of stool incontinence that was very bloody. His friend told him that he had no convulsions, no period of confusion when he woke up, no tongue biting but that he was very sweaty. Patient denies any chest pain before, during or after the event. Friend got him to work, gave him change of clothes and then took him here to the hospital. (09 Sep 2017 17:45)      BRIEF HOSPITAL COURSE:     RRT was called on pt at 8:25 pm today as he had bright red bloody bowel movement and syncopized in the bathroom. Pt was held by family member and hospital staff so no fall or trauma occurred. Pt seen and examined. He looked pale and diaphoretic likely vasovagal syncope as pt having bowel movement or sec to Lower GI bleed. T-99, /66, HR 80, RR 16, SpO2 98% on RA. 2 litres NS bolus was given right away and pt is AAOX3 and responded in few seconds and was transferred to bed. He denies any abd pain or other symptoms. Repeat CBC was 10.3 dropped from 10.8 in AM and on admission it was 13. GI Dr. Villavicencio and Attending were notified about RRT. Attending requested for ICU consult which was consulted. Blood transfusion consent and all labs were obtained. Discussed plan with family and pt at bedside. Will monitor CBC and vitals. Pt is stable for now. Will f/u CT Abd/pelvis. Will need Colonoscopy and/ EGD.    Addendum - Pt had another RRT at 9:45 pm for similar episode of bright red bloody bowel movement and hypotension to 80s, 1 litre bolus which is 3rd litre running and 1 unit pRBC ordered. ICU accepted the pt as he is hemodynamically unstable sec to active Lower GI bleed. Attending and GI made aware. RRT was called on pt at 8:25 pm today as he had bright red bloody bowel movement and syncopized in the bathroom. Pt was held by family member and hospital staff so no fall or trauma occurred. Pt seen and examined. He looked pale and diaphoretic likely vasovagal syncope as pt having bowel movement or sec to Lower GI bleed. T-99, /66, HR 80, RR 16, SpO2 98% on RA. 2 litres NS bolus was given right away and pt is AAOX3 and responded in few seconds and was transferred to bed. He denies any abd pain or other symptoms. Repeat CBC was 10.3 dropped from 10.8 in AM. GI Dr. Villavicencio and Attending were notified about RRT. Attending requested for ICU consult which was consulted. Blood transfusion consent and all labs were obtained. Discussed plan with family and pt at bedside. Will monitor CBC and vitals. Pt is stable for now. Will f/u CT Abd/pelvis. Will need Colonoscopy and/ EGD.      Review of Systems:  CONSTITUTIONAL: No fever, chills, or fatigue  EYES: No eye pain, visual disturbances, or discharge  ENMT:  No difficulty hearing, tinnitus, vertigo; No sinus or throat pain  NECK: No pain or stiffness  RESPIRATORY: No cough, wheezing, chills or hemoptysis; No shortness of breath  CARDIOVASCULAR: No chest pain, palpitations, or leg swelling  GASTROINTESTINAL: No abdominal or epigastric pain. No nausea, vomiting, or hematemesis; No diarrhea or constipation. No melena or hematochezia.  GENITOURINARY: No dysuria, frequency, hematuria, or incontinence  NEUROLOGICAL: No headaches, memory loss, loss of strength, numbness, or tremors  SKIN: No itching, burning, rashes, or lesions   MUSCULOSKELETAL: No joint pain or swelling; No muscle, back, or extremity pain  PSYCHIATRIC: No depression, anxiety, mood swings, or difficulty sleeping    PAST MEDICAL & SURGICAL HISTORY:  Kidney stones  Hyperlipidemia  Hypertension  Diabetes  No significant past surgical history    Allergies    No Known Allergies    Intolerances      FAMILY HISTORY:  No pertinent family history in first degree relatives    Home Medication: ****  Social history : ***        Medications:  insulin lispro (HumaLOG) corrective regimen sliding scale   SubCutaneous three times a day before meals  atorvastatin 40 milliGRAM(s) Oral at bedtime  pantoprazole  Injectable 40 milliGRAM(s) IV Push two times a day  sodium chloride 0.9%. 1000 milliLiter(s) IV Continuous <Continuous>      O2 supplementation/vent settings      Vital Signs Last 24 Hrs  T(C): 36.8 (10 Sep 2017 15:20), Max: 37.1 (10 Sep 2017 12:02)  T(F): 98.2 (10 Sep 2017 15:20), Max: 98.8 (10 Sep 2017 12:02)  HR: 70 (10 Sep 2017 15:20) (70 - 97)  BP: 129/76 (10 Sep 2017 15:20) (98/57 - 129/76)  BP(mean): --  RR: 16 (10 Sep 2017 15:20) (16 - 17)  SpO2: 99% (10 Sep 2017 15:20) (98% - 100%)              LABS:                        10.3   9.9   )-----------( 249      ( 10 Sep 2017 20:22 )             29.5     09-10    139  |  108  |  16  ----------------------------<  166<H>  3.6   |  23  |  1.15    Ca    8.4      10 Sep 2017 20:46  Phos  2.5     09-10  Mg     1.9     -10    TPro  7.3  /  Alb  3.7  /  TBili  0.4  /  DBili  x   /  AST  22  /  ALT  29  /  AlkPhos  81  09-09      CARDIAC MARKERS ( 10 Sep 2017 20:47 )  <0.015 ng/mL / x     / 92 U/L / x     / <1.0 ng/mL  CARDIAC MARKERS ( 10 Sep 2017 00:33 )  <0.015 ng/mL / x     / 116 U/L / x     / <1.0 ng/mL  CARDIAC MARKERS ( 09 Sep 2017 19:28 )  <0.015 ng/mL / x     / 128 U/L / x     / 1.8 ng/mL  CARDIAC MARKERS ( 09 Sep 2017 09:37 )  <0.015 ng/mL / x     / x     / x     / x          CAPILLARY BLOOD GLUCOSE  105 (10 Sep 2017 16:29)        PT/INR - ( 10 Sep 2017 20:51 )   PT: 13.4 sec;   INR: 1.22 ratio         PTT - ( 10 Sep 2017 20:51 )  PTT:24.9 sec  Urinalysis Basic - ( 09 Sep 2017 10:04 )    Color: Yellow / Appearance: Clear / S.025 / pH: x  Gluc: x / Ketone: Trace  / Bili: Negative / Urobili: Negative   Blood: x / Protein: 15 / Nitrite: Negative   Leuk Esterase: Negative / RBC: 0-2 /HPF / WBC 0-2 /HPF   Sq Epi: x / Non Sq Epi: x / Bacteria: Trace /HPF      CULTURES:        Physical Examination:    General: pale, diaphoretic      HEENT: pallor, Pupils equal, reactive to light.  Symmetric.    PULM: Clear to auscultation bilaterally, no significant sputum production    CVS: Regular rate and rhythm, no murmurs, rubs, or gallops    ABD: Soft, nondistended, nontender, normoactive bowel sounds, no masses    EXT: No edema, nontender    SKIN: Warm and well perfused, no rashes noted.    NEURO: Alert, oriented, interactive, nonfocal

## 2017-09-10 NOTE — ED ADULT NURSE REASSESSMENT NOTE - NS ED NURSE REASSESS COMMENT FT1
0800 Received pt awake alert responsive and oriented prefers Peruvian but speak a little English denies pain with IV denny 20G on left acf intact

## 2017-09-11 LAB
ALBUMIN SERPL ELPH-MCNC: 2.4 G/DL — LOW (ref 3.5–5)
ALP SERPL-CCNC: 47 U/L — SIGNIFICANT CHANGE UP (ref 40–120)
ALT FLD-CCNC: 18 U/L DA — SIGNIFICANT CHANGE UP (ref 10–60)
ANION GAP SERPL CALC-SCNC: 7 MMOL/L — SIGNIFICANT CHANGE UP (ref 5–17)
AST SERPL-CCNC: 11 U/L — SIGNIFICANT CHANGE UP (ref 10–40)
BASE EXCESS BLDA CALC-SCNC: -5.5 MMOL/L — LOW (ref -2–2)
BASOPHILS # BLD AUTO: 0.1 K/UL — SIGNIFICANT CHANGE UP (ref 0–0.2)
BASOPHILS NFR BLD AUTO: 1.2 % — SIGNIFICANT CHANGE UP (ref 0–2)
BILIRUB SERPL-MCNC: 1.2 MG/DL — SIGNIFICANT CHANGE UP (ref 0.2–1.2)
BLOOD GAS COMMENTS ARTERIAL: SIGNIFICANT CHANGE UP
BUN SERPL-MCNC: 18 MG/DL — SIGNIFICANT CHANGE UP (ref 7–18)
CALCIUM SERPL-MCNC: 7.1 MG/DL — LOW (ref 8.4–10.5)
CHLORIDE SERPL-SCNC: 111 MMOL/L — HIGH (ref 96–108)
CO2 SERPL-SCNC: 23 MMOL/L — SIGNIFICANT CHANGE UP (ref 22–31)
CREAT SERPL-MCNC: 1.02 MG/DL — SIGNIFICANT CHANGE UP (ref 0.5–1.3)
EOSINOPHIL # BLD AUTO: 0.2 K/UL — SIGNIFICANT CHANGE UP (ref 0–0.5)
EOSINOPHIL NFR BLD AUTO: 1.8 % — SIGNIFICANT CHANGE UP (ref 0–6)
GLUCOSE SERPL-MCNC: 180 MG/DL — HIGH (ref 70–99)
HCO3 BLDA-SCNC: 19 MMOL/L — LOW (ref 23–27)
HCT VFR BLD CALC: 22.7 % — LOW (ref 39–50)
HCT VFR BLD CALC: 25.6 % — LOW (ref 39–50)
HCT VFR BLD CALC: 26.9 % — LOW (ref 39–50)
HCT VFR BLD CALC: 28 % — LOW (ref 39–50)
HCT VFR BLD CALC: 31.6 % — LOW (ref 39–50)
HGB BLD-MCNC: 11 G/DL — LOW (ref 13–17)
HGB BLD-MCNC: 7.8 G/DL — LOW (ref 13–17)
HGB BLD-MCNC: 8.7 G/DL — LOW (ref 13–17)
HGB BLD-MCNC: 9.4 G/DL — LOW (ref 13–17)
HGB BLD-MCNC: 9.6 G/DL — LOW (ref 13–17)
HOROWITZ INDEX BLDA+IHG-RTO: 21 — SIGNIFICANT CHANGE UP
LACTATE SERPL-SCNC: 2.1 MMOL/L — HIGH (ref 0.7–2)
LYMPHOCYTES # BLD AUTO: 16.8 % — SIGNIFICANT CHANGE UP (ref 13–44)
LYMPHOCYTES # BLD AUTO: 2.1 K/UL — SIGNIFICANT CHANGE UP (ref 1–3.3)
MAGNESIUM SERPL-MCNC: 1.5 MG/DL — LOW (ref 1.6–2.6)
MCHC RBC-ENTMCNC: 28.5 PG — SIGNIFICANT CHANGE UP (ref 27–34)
MCHC RBC-ENTMCNC: 28.6 PG — SIGNIFICANT CHANGE UP (ref 27–34)
MCHC RBC-ENTMCNC: 28.6 PG — SIGNIFICANT CHANGE UP (ref 27–34)
MCHC RBC-ENTMCNC: 29.1 PG — SIGNIFICANT CHANGE UP (ref 27–34)
MCHC RBC-ENTMCNC: 29.7 PG — SIGNIFICANT CHANGE UP (ref 27–34)
MCHC RBC-ENTMCNC: 34.1 GM/DL — SIGNIFICANT CHANGE UP (ref 32–36)
MCHC RBC-ENTMCNC: 34.3 GM/DL — SIGNIFICANT CHANGE UP (ref 32–36)
MCHC RBC-ENTMCNC: 34.3 GM/DL — SIGNIFICANT CHANGE UP (ref 32–36)
MCHC RBC-ENTMCNC: 34.8 GM/DL — SIGNIFICANT CHANGE UP (ref 32–36)
MCHC RBC-ENTMCNC: 34.9 GM/DL — SIGNIFICANT CHANGE UP (ref 32–36)
MCV RBC AUTO: 83.4 FL — SIGNIFICANT CHANGE UP (ref 80–100)
MCV RBC AUTO: 83.7 FL — SIGNIFICANT CHANGE UP (ref 80–100)
MCV RBC AUTO: 85.1 FL — SIGNIFICANT CHANGE UP (ref 80–100)
MONOCYTES # BLD AUTO: 0.7 K/UL — SIGNIFICANT CHANGE UP (ref 0–0.9)
MONOCYTES NFR BLD AUTO: 5.2 % — SIGNIFICANT CHANGE UP (ref 2–14)
NEUTROPHILS # BLD AUTO: 9.5 K/UL — HIGH (ref 1.8–7.4)
NEUTROPHILS NFR BLD AUTO: 75 % — SIGNIFICANT CHANGE UP (ref 43–77)
PCO2 BLDA: 36 MMHG — SIGNIFICANT CHANGE UP (ref 32–46)
PH BLDA: 7.34 — LOW (ref 7.35–7.45)
PHOSPHATE SERPL-MCNC: 2.7 MG/DL — SIGNIFICANT CHANGE UP (ref 2.5–4.5)
PLATELET # BLD AUTO: 137 K/UL — LOW (ref 150–400)
PLATELET # BLD AUTO: 143 K/UL — LOW (ref 150–400)
PLATELET # BLD AUTO: 208 K/UL — SIGNIFICANT CHANGE UP (ref 150–400)
PLATELET # BLD AUTO: 208 K/UL — SIGNIFICANT CHANGE UP (ref 150–400)
PLATELET # BLD AUTO: 228 K/UL — SIGNIFICANT CHANGE UP (ref 150–400)
PO2 BLDA: 146 MMHG — HIGH (ref 74–108)
POTASSIUM SERPL-MCNC: 4.3 MMOL/L — SIGNIFICANT CHANGE UP (ref 3.5–5.3)
POTASSIUM SERPL-SCNC: 4.3 MMOL/L — SIGNIFICANT CHANGE UP (ref 3.5–5.3)
PROT SERPL-MCNC: 4.6 G/DL — LOW (ref 6–8.3)
RBC # BLD: 2.72 M/UL — LOW (ref 4.2–5.8)
RBC # BLD: 3.07 M/UL — LOW (ref 4.2–5.8)
RBC # BLD: 3.22 M/UL — LOW (ref 4.2–5.8)
RBC # BLD: 3.35 M/UL — LOW (ref 4.2–5.8)
RBC # BLD: 3.71 M/UL — LOW (ref 4.2–5.8)
RBC # FLD: 12.5 % — SIGNIFICANT CHANGE UP (ref 10.3–14.5)
RBC # FLD: 12.6 % — SIGNIFICANT CHANGE UP (ref 10.3–14.5)
RBC # FLD: 13.1 % — SIGNIFICANT CHANGE UP (ref 10.3–14.5)
SAO2 % BLDA: 99 % — HIGH (ref 92–96)
SODIUM SERPL-SCNC: 141 MMOL/L — SIGNIFICANT CHANGE UP (ref 135–145)
WBC # BLD: 11.6 K/UL — HIGH (ref 3.8–10.5)
WBC # BLD: 12.7 K/UL — HIGH (ref 3.8–10.5)
WBC # BLD: 13.7 K/UL — HIGH (ref 3.8–10.5)
WBC # BLD: 13.9 K/UL — HIGH (ref 3.8–10.5)
WBC # BLD: 8.9 K/UL — SIGNIFICANT CHANGE UP (ref 3.8–10.5)
WBC # FLD AUTO: 11.6 K/UL — HIGH (ref 3.8–10.5)
WBC # FLD AUTO: 12.7 K/UL — HIGH (ref 3.8–10.5)
WBC # FLD AUTO: 13.7 K/UL — HIGH (ref 3.8–10.5)
WBC # FLD AUTO: 13.9 K/UL — HIGH (ref 3.8–10.5)
WBC # FLD AUTO: 8.9 K/UL — SIGNIFICANT CHANGE UP (ref 3.8–10.5)

## 2017-09-11 PROCEDURE — 74174 CTA ABD&PLVS W/CONTRAST: CPT | Mod: 26

## 2017-09-11 PROCEDURE — 71010: CPT | Mod: 26,77

## 2017-09-11 PROCEDURE — 71010: CPT | Mod: 26

## 2017-09-11 RX ORDER — MAGNESIUM SULFATE 500 MG/ML
1 VIAL (ML) INJECTION ONCE
Qty: 0 | Refills: 0 | Status: COMPLETED | OUTPATIENT
Start: 2017-09-11 | End: 2017-09-11

## 2017-09-11 RX ORDER — PHENYLEPHRINE HYDROCHLORIDE 10 MG/ML
0.5 INJECTION INTRAVENOUS
Qty: 160 | Refills: 0 | Status: DISCONTINUED | OUTPATIENT
Start: 2017-09-11 | End: 2017-09-12

## 2017-09-11 RX ORDER — CIPROFLOXACIN LACTATE 400MG/40ML
400 VIAL (ML) INTRAVENOUS EVERY 12 HOURS
Qty: 0 | Refills: 0 | Status: DISCONTINUED | OUTPATIENT
Start: 2017-09-12 | End: 2017-09-20

## 2017-09-11 RX ORDER — SOD SULF/SODIUM/NAHCO3/KCL/PEG
4000 SOLUTION, RECONSTITUTED, ORAL ORAL ONCE
Qty: 0 | Refills: 0 | Status: COMPLETED | OUTPATIENT
Start: 2017-09-11 | End: 2017-09-11

## 2017-09-11 RX ORDER — CIPROFLOXACIN LACTATE 400MG/40ML
VIAL (ML) INTRAVENOUS
Qty: 0 | Refills: 0 | Status: DISCONTINUED | OUTPATIENT
Start: 2017-09-11 | End: 2017-09-20

## 2017-09-11 RX ORDER — METRONIDAZOLE 500 MG
500 TABLET ORAL EVERY 8 HOURS
Qty: 0 | Refills: 0 | Status: DISCONTINUED | OUTPATIENT
Start: 2017-09-11 | End: 2017-09-13

## 2017-09-11 RX ORDER — CIPROFLOXACIN LACTATE 400MG/40ML
400 VIAL (ML) INTRAVENOUS ONCE
Qty: 0 | Refills: 0 | Status: COMPLETED | OUTPATIENT
Start: 2017-09-11 | End: 2017-09-11

## 2017-09-11 RX ORDER — TRANEXAMIC ACID 100 MG/ML
1000 INJECTION, SOLUTION INTRAVENOUS ONCE
Qty: 0 | Refills: 0 | Status: COMPLETED | OUTPATIENT
Start: 2017-09-11 | End: 2017-09-11

## 2017-09-11 RX ADMIN — Medication 1: at 18:29

## 2017-09-11 RX ADMIN — Medication: at 00:51

## 2017-09-11 RX ADMIN — Medication 500 MILLIGRAM(S): at 20:34

## 2017-09-11 RX ADMIN — Medication 200 MILLIGRAM(S): at 20:32

## 2017-09-11 RX ADMIN — PHENYLEPHRINE HYDROCHLORIDE 5.62 MICROGRAM(S)/KG/MIN: 10 INJECTION INTRAVENOUS at 14:02

## 2017-09-11 RX ADMIN — ATORVASTATIN CALCIUM 40 MILLIGRAM(S): 80 TABLET, FILM COATED ORAL at 21:20

## 2017-09-11 RX ADMIN — SODIUM CHLORIDE 100 MILLILITER(S): 9 INJECTION, SOLUTION INTRAVENOUS at 18:58

## 2017-09-11 RX ADMIN — Medication 4000 MILLILITER(S): at 18:04

## 2017-09-11 RX ADMIN — Medication 1: at 23:17

## 2017-09-11 RX ADMIN — Medication 1: at 05:18

## 2017-09-11 RX ADMIN — PANTOPRAZOLE SODIUM 40 MILLIGRAM(S): 20 TABLET, DELAYED RELEASE ORAL at 05:18

## 2017-09-11 RX ADMIN — TRANEXAMIC ACID 220 MILLIGRAM(S): 100 INJECTION, SOLUTION INTRAVENOUS at 16:55

## 2017-09-11 RX ADMIN — PANTOPRAZOLE SODIUM 40 MILLIGRAM(S): 20 TABLET, DELAYED RELEASE ORAL at 18:28

## 2017-09-11 RX ADMIN — Medication 100 GRAM(S): at 06:00

## 2017-09-11 NOTE — PROCEDURE NOTE - NSPOSTCAREGUIDE_GEN_A_CORE
Care for catheter as per unit/ICU protocols/Verbal/written post procedure instructions were given to patient/caregiver
Care for catheter as per unit/ICU protocols

## 2017-09-11 NOTE — PROGRESS NOTE ADULT - SUBJECTIVE AND OBJECTIVE BOX
Patient examined at bedside, no complaints.   No nausea, no vomiting, mild abdominal pain at epigastrum improving  Pt is npo    T(C): 36.7 (09-11-17 @ 05:00), Max: 37.1 (09-10-17 @ 12:02)  HR: 86 (09-11-17 @ 08:00) (70 - 110)  BP: 111/62 (09-11-17 @ 08:00) (95/56 - 129/76)  RR: 8 (09-11-17 @ 08:00) (8 - 24)  SpO2: 100% (09-11-17 @ 08:00) (96% - 100%)  Wt(kg): --      09-10 @ 07:01  -  09-11 @ 07:00  --------------------------------------------------------  IN: 3600 mL / OUT: 820 mL / NET: 2780 mL    09-11 @ 07:01  -  09-11 @ 09:37  --------------------------------------------------------  IN: 100 mL / OUT: 450 mL / NET: -350 mL      elizondo in place with clear urine      Physical Exam  General: AAOx3, No acute distress  Skin: No jaundice, no icterus  Abdomen: soft, mild suprapubic tenderness, nondistended, no rebound tenderness, no guarding, no palpable masses  Extremities: non edematous, no calf pain bilaterally                          8.7    12.7  )-----------( 208      ( 11 Sep 2017 05:00 )             25.6   09-11    141  |  111<H>  |  18  ----------------------------<  180<H>  4.3   |  23  |  1.02    Ca    7.1<L>      11 Sep 2017 05:00  Phos  2.7     09-11  Mg     1.5     09-11    TPro  4.6<L>  /  Alb  2.4<L>  /  TBili  1.2  /  DBili  x   /  AST  11  /  ALT  18  /  AlkPhos  47  09-11

## 2017-09-11 NOTE — CONSULT NOTE ADULT - PROBLEM SELECTOR RECOMMENDATION 9
hemorrhagic shock 2/2 GI bleed.  syncope likely 2/2 vasovagal effect 2/2 acute GI bleed.  no chest pain, EKG without acute ischemic changes, cardiac enzymes negative x 3, TTE with normal EF.   - pressor support  - identify bleeding site; CTA results pending  - GI and surgery following

## 2017-09-11 NOTE — PROGRESS NOTE ADULT - ASSESSMENT
71M from home, still works PMH of DM, HTN, Kidney stones, HLD who presents to hospital for syncope, possible seizure while on the way to work and episode of bloody stool incontinence.  Pt had 2 RRTs today for 2 episodes of bright red bloody bowel movement and hypotension to 80s, s/p 3 litre boluses and 1 unit pRBC being given. ICU accepted the pt as he is hemodynamically unstable sec to active Lower GI bleed.

## 2017-09-11 NOTE — PROGRESS NOTE ADULT - PROBLEM SELECTOR PLAN 1
Lower GI bleed of unclear etiology, possibly diverticulosis bleed. Also with hemorrhagic shock and symptomatic anemia from blood loss.  -S/p IV NS boluses x3, Transfusion of 2 PRBCs.  -monitor CBC q6hrs for now. NPO  -GI and surgery consulted.  -No aspirin or anticoagulants or heparin/  Lovenox at this time in view of GI bleed.  -will get CT scan with IV today

## 2017-09-11 NOTE — PROCEDURE NOTE - NSPROCDETAILS_GEN_ALL_CORE
sutured in place/positive blood return obtained via catheter/Seldinger technique/location identified, draped/prepped, sterile technique used, needle inserted/introduced/connected to a pressurized flush line/ultrasound guidance

## 2017-09-11 NOTE — PROGRESS NOTE ADULT - ASSESSMENT
GI bleed pailess  possible Diverticular vs others.  seen by surgeon GI still pending   spoke to gi he will be there soon ..  cont cardiac monitor,  cbc,  pt is very critical  ppi.

## 2017-09-11 NOTE — CONSULT NOTE ADULT - SUBJECTIVE AND OBJECTIVE BOX
CHIEF COMPLAINT:    HPI:HPI:  71M from home, still works PMH of DM, HTN, Kidney stones, HLD who presents to hospital for syncope, possible seizure while on the way to work and episode of bloody stool incontinence. Patient says he was on his way to work in the morning, on the train going to [place] and when he was getting off, he suddenly blacked out. As per friend who was with him, he was unconscious for 10 mins and when he woke up, he was very dizzy and had episode of stool incontinence that was very bloody. His friend told him that he had no convulsions, no period of confusion when he woke up, no tongue biting but that he was very sweaty. Patient denies any chest pain before, during or after the event. Friend got him to work, gave him change of clothes and then took him here to the hospital. (09 Sep 2017 17:45)      PAST MEDICAL & SURGICAL HISTORY:  Kidney stones  Hyperlipidemia  Hypertension  Diabetes  No significant past surgical history      MEDICATIONS  (STANDING):  atorvastatin 40 milliGRAM(s) Oral at bedtime  pantoprazole  Injectable 40 milliGRAM(s) IV Push two times a day  dextrose 5% + sodium chloride 0.9%. 1000 milliLiter(s) (100 mL/Hr) IV Continuous <Continuous>  insulin lispro (HumaLOG) corrective regimen sliding scale   SubCutaneous every 6 hours    MEDICATIONS  (PRN):      FAMILY HISTORY:  No pertinent family history in first degree relatives    No family history of premature coronary artery disease or sudden cardiac death    SOCIAL HISTORY:  Smoking-  Alcohol-  Ilicit Drug use-    REVIEW OF SYSTEMS:  Constitutional: [ ] fever, [ ]weight loss,  [ ]fatigue  Eyes: [ ] visual changes  Respiratory: [ ]shortness of breath;  [ ] cough, [ ]wheezing, [ ]chills, [ ]hemoptysis  Cardiovascular: [ ] chest pain, [ ]palpitations, [ ]dizziness,  [ ]leg swelling  Gastrointestinal: [ ] abdominal pain, [ ]nausea, [ ]vomiting,  [ ]diarrhea   Genitourinary: [ ] dysuria, [ ] hematuria  Neurologic: [ ] headaches [ ] tremors  Skin: [ ] itching, [ ]burning, [ ] rashes  Endocrine: [ ] heat or cold intolerance  Musculoskeletal: [ ] joint pain or swelling; [ ] muscle, back, or extremity pain  Psychiatric: [ ] depression, [ ]anxiety, [ ]mood swings, or [ ]difficulty sleeping  Hematologic: [ ] easy bruising, [ ] bleeding gums       [ x] All others negative	  [ ] Unable to obtain    Vital Signs Last 24 Hrs  T(C): 36.7 (11 Sep 2017 05:00), Max: 37.1 (10 Sep 2017 12:02)  T(F): 98 (11 Sep 2017 05:00), Max: 98.8 (10 Sep 2017 12:02)  HR: 86 (11 Sep 2017 08:00) (70 - 110)  BP: 111/62 (11 Sep 2017 08:00) (95/56 - 129/76)  BP(mean): 73 (11 Sep 2017 08:00) (64 - 82)  RR: 8 (11 Sep 2017 08:00) (8 - 24)  SpO2: 100% (11 Sep 2017 08:00) (96% - 100%)  I&O's Summary    10 Sep 2017 07:01  -  11 Sep 2017 07:00  --------------------------------------------------------  IN: 3600 mL / OUT: 820 mL / NET: 2780 mL    11 Sep 2017 07:01  -  11 Sep 2017 10:07  --------------------------------------------------------  IN: 100 mL / OUT: 450 mL / NET: -350 mL        PHYSICAL EXAM:  General: No acute distress  HEENT: EOMI, PERRL  Neck: Supple, No JVD  Lungs: Clear to percussion bilaterally; No rales or wheezing  Heart: Regular rate and rhythm; No murmurs, rubs, or gallops  Abdomen: Nontender, bowel sounds present  Extremities: No clubbing, cyanosis, or edema  Nervous system:  Alert & Oriented X3, no focal deficits  Psychiatric: Normal affect  Skin: No rashes or lesions      LABS:  09-11    141  |  111<H>  |  18  ----------------------------<  180<H>  4.3   |  23  |  1.02    Ca    7.1<L>      11 Sep 2017 05:00  Phos  2.7     09-11  Mg     1.5     09-11    TPro  4.6<L>  /  Alb  2.4<L>  /  TBili  1.2  /  DBili  x   /  AST  11  /  ALT  18  /  AlkPhos  47  09-11    Creatinine Trend: 1.02<--, 1.15<--, 1.02<--, 1.18<--                        8.7    12.7  )-----------( 208      ( 11 Sep 2017 05:00 )             25.6     PT/INR - ( 10 Sep 2017 20:51 )   PT: 13.4 sec;   INR: 1.22 ratio         PTT - ( 10 Sep 2017 20:51 )  PTT:24.9 sec    Lipid Panel:   Cardiac Enzymes: CARDIAC MARKERS ( 10 Sep 2017 20:47 )  <0.015 ng/mL / x     / 92 U/L / x     / <1.0 ng/mL  CARDIAC MARKERS ( 10 Sep 2017 00:33 )  <0.015 ng/mL / x     / 116 U/L / x     / <1.0 ng/mL  CARDIAC MARKERS ( 09 Sep 2017 19:28 )  <0.015 ng/mL / x     / 128 U/L / x     / 1.8 ng/mL        09-10 [id number] 7.0      RADIOLOGY:    ECG [my interpretation]:    TELEMETRY:    ECHO:    STRESS TEST:    CATHETERIZATION:

## 2017-09-11 NOTE — CONSULT NOTE ADULT - SUBJECTIVE AND OBJECTIVE BOX
[  ] STAT REQUEST              [ X ]ROUTINE REQUEST    Patient is a 71 year old male who presents with a chief complaint of Passing out, fecal incontinence, blood in stool. GI consulted to evaluate.      HPI:  71 year old male with multiple medical problem presented s to hospital for syncope, possible seizure while on the way to work and episode of bloody stool incontinence. Patient says he was on his way to work in the morning, on the train going to [place] and when he was getting off, he suddenly blacked out. As per friend who was with him, he was unconscious for 10 mins and when he woke up, he was very dizzy and had episode of stool incontinence that was very bloody. His friend told him that he had no convulsions, no period of confusion when he woke up, no tongue biting but that he was very sweaty. Patient denies any chest pain before, during or after the event. Friend got him to work, gave him change of clothes and then took him here to the hospital. (09 Sep 2017 17:45)      PAIN MANAGEMENT:  Pain Scale:                 /10  Pain Location:      Prior Colonoscopy:    PAST MEDICAL HISTORY  Kidney stones  Hyperlipidemia  Hypertension  Diabetes      PAST SURGICAL HISTORY  No significant past surgical history      Allergies    No Known Allergies    Intolerances        HOME MEDICATIONS    MEDICATIONS  (STANDING):  atorvastatin 40 milliGRAM(s) Oral at bedtime  pantoprazole  Injectable 40 milliGRAM(s) IV Push two times a day  dextrose 5% + sodium chloride 0.9%. 1000 milliLiter(s) (100 mL/Hr) IV Continuous <Continuous>  insulin lispro (HumaLOG) corrective regimen sliding scale   SubCutaneous every 6 hours  tranexamic acid IVPB 1000 milliGRAM(s) IV Intermittent once  phenylephrine    Infusion 0.5 MICROgram(s)/kG/Min (5.625 mL/Hr) IV Continuous <Continuous>    MEDICATIONS  (PRN):      SOCIAL HISTORY  Advanced Directives:       [  ] Full Code       [  ] DNR  Marital Status:         [  ] M      [  ] S      [  ] D       [  ] W  Children:       [  ] Yes      [  ] No  Occupation:        [  ] Employed       [  ] Unemployed       [  ] Retired  Diet:       [  ] Regular       [  ] PEG feeding          [  ] NG tube feeding  Drug Use:           [  ] Patient denied          [  ] Yes  Alcohol:           [  ] No             [  ] Yes (socially)         [  ] Yes (chronic)  Tobacco:           [  ] Yes           [  ] No    FAMILY HISTORY  [  ] Heart Disease            [  ] Diabetes             [  ] HTN             [  ] Colon Cancer             [  ] Stomach Cancer              [  ] Pancreatic Cancer    VITAL SIGNS  Temp:  BP:  P:  R:  Daily     Daily        CBC Full  -  ( 11 Sep 2017 11:34 )  WBC Count : 11.6 K/uL  Hemoglobin : 7.8 g/dL  Hematocrit : 22.7 %  Platelet Count - Automated : 228 K/uL  Mean Cell Volume : 83.4 fl  Mean Cell Hemoglobin : 28.6 pg  Mean Cell Hemoglobin Concentration : 34.3 gm/dL  Auto Neutrophil # : x  Auto Lymphocyte # : x  Auto Monocyte # : x  Auto Eosinophil # : x  Auto Basophil # : x  Auto Neutrophil % : x  Auto Lymphocyte % : x  Auto Monocyte % : x  Auto Eosinophil % : x  Auto Basophil % : x      09-11    141  |  111<H>  |  18  ----------------------------<  180<H>  4.3   |  23  |  1.02    Ca    7.1<L>      11 Sep 2017 05:00  Phos  2.7     09-11  Mg     1.5     09-11    TPro  4.6<L>  /  Alb  2.4<L>  /  TBili  1.2  /  DBili  x   /  AST  11  /  ALT  18  /  AlkPhos  47  09-11          ALT/SGPT 18  Albumin, Serum 2.4  Alkaline Phosphatase 47  AST/SGOT 11  Bilirubin Direct --  Bilirubin Total 1.2  Indirect Bilirubin --  Hepatitis A Total --  Hepatitis B Core Antibody --  Hepatitis B Surface Antibody --  Hepatitis B Surface Antigen --  Hepatitis C Virus Interpretation --  Hepatitis C Virus Genotype --  ALT/SGPT 29  Albumin, Serum 3.7  Alkaline Phosphatase 81  AST/SGOT 22  Bilirubin Direct --  Bilirubin Total 0.4  Indirect Bilirubin --  Hepatitis A Total --  Hepatitis B Core Antibody --  Hepatitis B Surface Antibody --  Hepatitis B Surface Antigen --  Hepatitis C Virus Interpretation --  Hepatitis C Virus Genotype --          PT/INR - ( 10 Sep 2017 20:51 )   PT: 13.4 sec;   INR: 1.22 ratio         PTT - ( 10 Sep 2017 20:51 )  PTT:24.9 sec    RADIOLOGY/IMAGING [  ] STAT REQUEST              [ X ]ROUTINE REQUEST    Patient is a 71 year old male who presents with a chief complaint of Passing out, fecal incontinence, blood in stool. GI consulted to evaluate.      HPI:  71 year old male with multiple medical problem who presented s to hospital for syncope associated with rectal bleeding. Patient denies abdominal pain, N/V, hematemesis, melena, fever, chills, chest pain, SOB, cough, hematuria or hemoptysis.      PAIN MANAGEMENT:  Pain Scale:                 0/10  Pain Location:      Prior Colonoscopy: Unknown    PAST MEDICAL HISTORY  Kidney stones  Hyperlipidemia  Hypertension  Diabetes      PAST SURGICAL HISTORY  No significant past surgical history      Allergies    No Known Allergies       MEDICATIONS  (STANDING):  atorvastatin 40 milliGRAM(s) Oral at bedtime  pantoprazole  Injectable 40 milliGRAM(s) IV Push two times a day  dextrose 5% + sodium chloride 0.9%. 1000 milliLiter(s) (100 mL/Hr) IV Continuous <Continuous>  insulin lispro (HumaLOG) corrective regimen sliding scale   SubCutaneous every 6 hours  tranexamic acid IVPB 1000 milliGRAM(s) IV Intermittent once  phenylephrine    Infusion 0.5 MICROgram(s)/kG/Min (5.625 mL/Hr) IV Continuous <Continuous>    MEDICATIONS  (PRN):      SOCIAL HISTORY  Advanced Directives:       [ X ] Full Code       [  ] DNR  Marital Status:         [ X ] M      [  ] S      [  ] D       [  ] W  Children:       [ X ] Yes      [  ] No  Occupation:        [ X ] Employed       [  ] Unemployed       [  ] Retired  Diet:       [  X] Regular       [  ] PEG feeding          [  ] NG tube feeding  Drug Use:           [ X ] Patient denied          [  ] Yes  Alcohol:           [ X ] No             [  ] Yes (socially)         [  ] Yes (chronic)  Tobacco:           [  ] Yes           [ X ] No    FAMILY HISTORY  [ X ] Heart Disease (father)            [  ] Diabetes             [  ] HTN             [  ] Colon Cancer             [  ] Stomach Cancer              [  ] Pancreatic Cancer    VITAL SIGNS   Vital Signs Last 24 Hrs  T(C): 36.5 (11 Sep 2017 10:00), Max: 36.8 (10 Sep 2017 15:20)  T(F): 97.7 (11 Sep 2017 10:00), Max: 98.2 (10 Sep 2017 15:20)  HR: 84 (11 Sep 2017 13:00) (70 - 110)  BP: 95/51 (11 Sep 2017 13:00) (73/40 - 129/76)  BP(mean): 62 (11 Sep 2017 13:00) (47 - 82)  RR: 14 (11 Sep 2017 13:00) (0 - 24)  SpO2: 100% (11 Sep 2017 13:00) (96% - 100%)        CBC Full  -  ( 11 Sep 2017 11:34 )  WBC Count : 11.6 K/uL  Hemoglobin : 7.8 g/dL  Hematocrit : 22.7 %  Platelet Count - Automated : 228 K/uL  Mean Cell Volume : 83.4 fl  Mean Cell Hemoglobin : 28.6 pg  Mean Cell Hemoglobin Concentration : 34.3 gm/dL   ophil % : x       Complete Blood Count + Automated Diff (09.11.17 @ 05:00)    WBC Count: 12.7 K/uL    RBC Count: 3.07 M/uL    Hemoglobin: 8.7 g/dL    Hematocrit: 25.6 %    Mean Cell Volume: 83.4 fl    Mean Cell Hemoglobin: 28.5 pg         Complete Blood Count + Automated Diff (09.09.17 @ 09:39)    WBC Count: 17.7 K/uL    RBC Count: 4.39 M/uL    Hemoglobin: 13.0 g/dL    Hematocrit: 37.5 %    Mean Cell Volume: 85.3 fl    Mean Cell Hemoglobin: 29.6 pg    Mean Cell Hemoglobin Conc: 34.7 gm/dL    Red Cell Distrib Width: 12.8 %    Platelet Count - Automated: 260 K/uL        141  |  111<H>  |  18  ----------------------------<  180<H>  4.3   |  23  |  1.02    Ca    7.1<L>      11 Sep 2017 05:00  Phos  2.7     09-11  Mg     1.5     09-11      Occult Blood, Feces (09.09.17 @ 12:30)    Occult Blood, Feces: Positive    TPro  4.6<L>  /  Alb  2.4<L>  /  TBili  1.2  /  DBili  x   /  AST  11  /  ALT  18  /  AlkPhos  47  09-11     PT/INR - ( 10 Sep 2017 20:51 )   PT: 13.4 sec;   INR: 1.22 ratio       PTT - ( 10 Sep 2017 20:51 )  PTT:24.9 sec        Urinalysis (09.09.17 @ 10:04)    pH Urine: 5.0    Glucose Qualitative, Urine: 250    Blood, Urine: Negative    Color: Yellow    Urine Appearance: Clear    Bilirubin: Negative    Ketone - Urine: Trace    Specific Gravity: 1.025    Protein, Urine: 15    Urobilinogen: Negative    Nitrite: Negative    Leukocyte Esterase Concentration: Negative      RADIOLOGY/IMAGING                EXAM:  CT BRAIN                            PROCEDURE DATE:  09/09/2017          INTERPRETATION:  HISTORY: Syncope    Evaluation demonstrates no evidence of mass-effect or midline shift. No   intraparenchymal mass lesions or hemorrhage is identified.     There is   no evidence of hydrocephalus. No extra-axial collections are noted.    The bone windows demonstrate no gross osseous abnormalities.        Impression:  1. Unremarkable noncontrast CT scan of the brain.          EXAM:  CHEST PORTABLE ROUTINE                            PROCEDURE DATE:  09/11/2017          INTERPRETATION:  AP semierect chest on September 11 at 9:01 AM. Patient   had syncope and collapse and pulmonary congestion.    Heart size is within normal limits.    Right jugular line remains.    The lung fields and pleural surfaces are unremarkable.    The chest is similar to September 10.    IMPRESSION: Negative chest.

## 2017-09-11 NOTE — CONSULT NOTE ADULT - RS GEN PE MLT RESP DETAILS PC
no rhonchi/clear to auscultation bilaterally/no rales/no wheezes/airway patent/breath sounds equal/good air movement

## 2017-09-11 NOTE — PROGRESS NOTE ADULT - SUBJECTIVE AND OBJECTIVE BOX
HPI:  71M from home, still works PMH of DM, HTN, Kidney stones, HLD who presents to hospital for syncope, possible seizure while on the way to work and episode of bloody stool incontinence. Patient says he was on his way to work in the morning, on the train going to [place] and when he was getting off, he suddenly blacked out. As per friend who was with him, he was unconscious for 10 mins and when he woke up, he was very dizzy and had episode of stool incontinence that was very bloody. His friend told him that he had no convulsions, no period of confusion when he woke up, no tongue biting but that he was very sweaty. Patient denies any chest pain before, during or after the event. Friend got him to work, gave him change of clothes and then took him here to the hospital. (09 Sep 2017 17:45)      Patient is a 71y old  Male who presents with a chief complaint of Passing out, fecal incontinence, blood in stool (09 Sep 2017 17:45)  events noted  pt had GI Bleed i was called advised ICU as pt s hgb stable was monitored  later on i called found pt to be hypotensive  ICY accepted  recieved 2 units PRBC  still on hypotensive  .  last night I again to call GI and Surgeon  both were called. seen by surgery  as pt is hypotensive ni angiogram is not done.    INTERVAL HPI/OVERNIGHT EVENTS:  T(C): 36.5 (09-11-17 @ 10:00), Max: 36.8 (09-10-17 @ 15:20)  HR: 81 (09-11-17 @ 11:40) (70 - 110)  BP: 102/59 (09-11-17 @ 11:30) (73/40 - 129/76)  RR: 0 (09-11-17 @ 11:40) (0 - 24)  SpO2: 100% (09-11-17 @ 11:40) (96% - 100%)  Wt(kg): --  I&O's Summary    10 Sep 2017 07:01  -  11 Sep 2017 07:00  --------------------------------------------------------  IN: 3600 mL / OUT: 820 mL / NET: 2780 mL    11 Sep 2017 07:01  -  11 Sep 2017 12:10  --------------------------------------------------------  IN: 100 mL / OUT: 450 mL / NET: -350 mL        REVIEW OF SYSTEMS: denies fever, chills, SOB, palpitations, chest pain, abdominal pain, nausea, vomitting, diarrhea, constipation, dizziness    MEDICATIONS  (STANDING):  atorvastatin 40 milliGRAM(s) Oral at bedtime  pantoprazole  Injectable 40 milliGRAM(s) IV Push two times a day  dextrose 5% + sodium chloride 0.9%. 1000 milliLiter(s) (100 mL/Hr) IV Continuous <Continuous>  insulin lispro (HumaLOG) corrective regimen sliding scale   SubCutaneous every 6 hours    MEDICATIONS  (PRN):      PHYSICAL EXAM:  GENERAL: NAD, well-groomed, well-developed  HEAD:  Atraumatic, Normocephalic  EYES: EOMI, PERRLA, conjunctiva and sclera clear  ENMT: No tonsillar erythema, exudates, or enlargement; Moist mucous membranes, Good dentition, No lesions  NECK: Supple, No JVD, Normal thyroid  NERVOUS SYSTEM:  Alert & Oriented X3, Good concentration; Motor Strength 5/5 B/L upper and lower extremities; DTRs 2+ intact and symmetric  CHEST/LUNG: Clear to percussion bilaterally; No rales, rhonchi, wheezing, or rubs  HEART: Regular rate and rhythm; No murmurs, rubs, or gallops  ABDOMEN: Soft, Nontender, Nondistended; Bowel sounds present  EXTREMITIES:  2+ Peripheral Pulses, No clubbing, cyanosis, or edema  LYMPH: No lymphadenopathy noted  SKIN: No rashes or lesions  LABS:                        8.7    12.7  )-----------( 208      ( 11 Sep 2017 05:00 )             25.6     09-11    141  |  111<H>  |  18  ----------------------------<  180<H>  4.3   |  23  |  1.02    Ca    7.1<L>      11 Sep 2017 05:00  Phos  2.7     09-11  Mg     1.5     09-11    TPro  4.6<L>  /  Alb  2.4<L>  /  TBili  1.2  /  DBili  x   /  AST  11  /  ALT  18  /  AlkPhos  47  09-11    PT/INR - ( 10 Sep 2017 20:51 )   PT: 13.4 sec;   INR: 1.22 ratio         PTT - ( 10 Sep 2017 20:51 )  PTT:24.9 sec    CAPILLARY BLOOD GLUCOSE  118 (11 Sep 2017 11:00)  183 (11 Sep 2017 05:30)  204 (11 Sep 2017 00:00)  146 (10 Sep 2017 21:46)  105 (10 Sep 2017 16:29)  164 (10 Sep 2017 12:26)

## 2017-09-12 LAB
ANION GAP SERPL CALC-SCNC: 4 MMOL/L — LOW (ref 5–17)
BUN SERPL-MCNC: 13 MG/DL — SIGNIFICANT CHANGE UP (ref 7–18)
CALCIUM SERPL-MCNC: 7.3 MG/DL — LOW (ref 8.4–10.5)
CHLORIDE SERPL-SCNC: 112 MMOL/L — HIGH (ref 96–108)
CO2 SERPL-SCNC: 28 MMOL/L — SIGNIFICANT CHANGE UP (ref 22–31)
CREAT SERPL-MCNC: 0.93 MG/DL — SIGNIFICANT CHANGE UP (ref 0.5–1.3)
GLUCOSE SERPL-MCNC: 176 MG/DL — HIGH (ref 70–99)
HCT VFR BLD CALC: 22.5 % — LOW (ref 39–50)
HCT VFR BLD CALC: 23.8 % — LOW (ref 39–50)
HCT VFR BLD CALC: 24.4 % — LOW (ref 39–50)
HCT VFR BLD CALC: 24.5 % — LOW (ref 39–50)
HGB BLD-MCNC: 8.2 G/DL — LOW (ref 13–17)
HGB BLD-MCNC: 8.4 G/DL — LOW (ref 13–17)
HGB BLD-MCNC: 8.5 G/DL — LOW (ref 13–17)
HGB BLD-MCNC: 8.8 G/DL — LOW (ref 13–17)
MAGNESIUM SERPL-MCNC: 1.6 MG/DL — SIGNIFICANT CHANGE UP (ref 1.6–2.6)
MCHC RBC-ENTMCNC: 29.4 PG — SIGNIFICANT CHANGE UP (ref 27–34)
MCHC RBC-ENTMCNC: 29.9 PG — SIGNIFICANT CHANGE UP (ref 27–34)
MCHC RBC-ENTMCNC: 30.8 PG — SIGNIFICANT CHANGE UP (ref 27–34)
MCHC RBC-ENTMCNC: 30.9 PG — SIGNIFICANT CHANGE UP (ref 27–34)
MCHC RBC-ENTMCNC: 34.7 GM/DL — SIGNIFICANT CHANGE UP (ref 32–36)
MCHC RBC-ENTMCNC: 35.5 GM/DL — SIGNIFICANT CHANGE UP (ref 32–36)
MCHC RBC-ENTMCNC: 35.8 GM/DL — SIGNIFICANT CHANGE UP (ref 32–36)
MCHC RBC-ENTMCNC: 36.2 GM/DL — HIGH (ref 32–36)
MCV RBC AUTO: 84.3 FL — SIGNIFICANT CHANGE UP (ref 80–100)
MCV RBC AUTO: 84.6 FL — SIGNIFICANT CHANGE UP (ref 80–100)
MCV RBC AUTO: 85.1 FL — SIGNIFICANT CHANGE UP (ref 80–100)
MCV RBC AUTO: 86.2 FL — SIGNIFICANT CHANGE UP (ref 80–100)
PHOSPHATE SERPL-MCNC: 2 MG/DL — LOW (ref 2.5–4.5)
PLATELET # BLD AUTO: 127 K/UL — LOW (ref 150–400)
PLATELET # BLD AUTO: 132 K/UL — LOW (ref 150–400)
PLATELET # BLD AUTO: 143 K/UL — LOW (ref 150–400)
PLATELET # BLD AUTO: 160 K/UL — SIGNIFICANT CHANGE UP (ref 150–400)
POTASSIUM SERPL-MCNC: 3.7 MMOL/L — SIGNIFICANT CHANGE UP (ref 3.5–5.3)
POTASSIUM SERPL-SCNC: 3.7 MMOL/L — SIGNIFICANT CHANGE UP (ref 3.5–5.3)
RBC # BLD: 2.64 M/UL — LOW (ref 4.2–5.8)
RBC # BLD: 2.83 M/UL — LOW (ref 4.2–5.8)
RBC # BLD: 2.84 M/UL — LOW (ref 4.2–5.8)
RBC # BLD: 2.89 M/UL — LOW (ref 4.2–5.8)
RBC # FLD: 12.4 % — SIGNIFICANT CHANGE UP (ref 10.3–14.5)
RBC # FLD: 12.6 % — SIGNIFICANT CHANGE UP (ref 10.3–14.5)
RBC # FLD: 13 % — SIGNIFICANT CHANGE UP (ref 10.3–14.5)
RBC # FLD: 13.1 % — SIGNIFICANT CHANGE UP (ref 10.3–14.5)
SODIUM SERPL-SCNC: 144 MMOL/L — SIGNIFICANT CHANGE UP (ref 135–145)
WBC # BLD: 7.5 K/UL — SIGNIFICANT CHANGE UP (ref 3.8–10.5)
WBC # BLD: 8.2 K/UL — SIGNIFICANT CHANGE UP (ref 3.8–10.5)
WBC # BLD: 9 K/UL — SIGNIFICANT CHANGE UP (ref 3.8–10.5)
WBC # BLD: 9.3 K/UL — SIGNIFICANT CHANGE UP (ref 3.8–10.5)
WBC # FLD AUTO: 7.5 K/UL — SIGNIFICANT CHANGE UP (ref 3.8–10.5)
WBC # FLD AUTO: 8.2 K/UL — SIGNIFICANT CHANGE UP (ref 3.8–10.5)
WBC # FLD AUTO: 9 K/UL — SIGNIFICANT CHANGE UP (ref 3.8–10.5)
WBC # FLD AUTO: 9.3 K/UL — SIGNIFICANT CHANGE UP (ref 3.8–10.5)

## 2017-09-12 RX ORDER — SODIUM CHLORIDE 9 MG/ML
1000 INJECTION, SOLUTION INTRAVENOUS
Qty: 0 | Refills: 0 | Status: DISCONTINUED | OUTPATIENT
Start: 2017-09-12 | End: 2017-09-16

## 2017-09-12 RX ORDER — POTASSIUM PHOSPHATE, MONOBASIC POTASSIUM PHOSPHATE, DIBASIC 236; 224 MG/ML; MG/ML
15 INJECTION, SOLUTION INTRAVENOUS ONCE
Qty: 0 | Refills: 0 | Status: COMPLETED | OUTPATIENT
Start: 2017-09-12 | End: 2017-09-12

## 2017-09-12 RX ADMIN — Medication 500 MILLIGRAM(S): at 21:34

## 2017-09-12 RX ADMIN — Medication 500 MILLIGRAM(S): at 14:18

## 2017-09-12 RX ADMIN — Medication 1: at 05:29

## 2017-09-12 RX ADMIN — Medication 200 MILLIGRAM(S): at 17:50

## 2017-09-12 RX ADMIN — Medication 500 MILLIGRAM(S): at 14:19

## 2017-09-12 RX ADMIN — POTASSIUM PHOSPHATE, MONOBASIC POTASSIUM PHOSPHATE, DIBASIC 62.5 MILLIMOLE(S): 236; 224 INJECTION, SOLUTION INTRAVENOUS at 14:58

## 2017-09-12 RX ADMIN — PANTOPRAZOLE SODIUM 40 MILLIGRAM(S): 20 TABLET, DELAYED RELEASE ORAL at 05:29

## 2017-09-12 RX ADMIN — ATORVASTATIN CALCIUM 40 MILLIGRAM(S): 80 TABLET, FILM COATED ORAL at 21:34

## 2017-09-12 RX ADMIN — Medication 200 MILLIGRAM(S): at 05:30

## 2017-09-12 RX ADMIN — SODIUM CHLORIDE 100 MILLILITER(S): 9 INJECTION, SOLUTION INTRAVENOUS at 05:28

## 2017-09-12 RX ADMIN — SODIUM CHLORIDE 50 MILLILITER(S): 9 INJECTION, SOLUTION INTRAVENOUS at 12:52

## 2017-09-12 RX ADMIN — Medication 500 MILLIGRAM(S): at 05:28

## 2017-09-12 RX ADMIN — SODIUM CHLORIDE 50 MILLILITER(S): 9 INJECTION, SOLUTION INTRAVENOUS at 17:51

## 2017-09-12 RX ADMIN — Medication 1: at 17:53

## 2017-09-12 RX ADMIN — PANTOPRAZOLE SODIUM 40 MILLIGRAM(S): 20 TABLET, DELAYED RELEASE ORAL at 17:51

## 2017-09-12 NOTE — PROGRESS NOTE ADULT - SUBJECTIVE AND OBJECTIVE BOX
HPI:  71M from home, still works PMH of DM, HTN, Kidney stones, HLD who presents to hospital for syncope, possible seizure while on the way to work and episode of bloody stool incontinence. Patient says he was on his way to work in the morning, on the train going to [place] and when he was getting off, he suddenly blacked out. As per friend who was with him, he was unconscious for 10 mins and when he woke up, he was very dizzy and had episode of stool incontinence that was very bloody. His friend told him that he had no convulsions, no period of confusion when he woke up, no tongue biting but that he was very sweaty. Patient denies any chest pain before, during or after the event. Friend got him to work, gave him change of clothes and then took him here to the hospital. (09 Sep 2017 17:45)      Patient is a 71y old  Male who presents with a chief complaint of Passing out, fecal incontinence, blood in stool (09 Sep 2017 17:45)  events noted  now no furthur bleed since yesterday.  Received 6 units of PRBCs and 4 ffps.  off the Vasoprresors  bp is 120/64 at present .  awake alert not in any distress. denoes any abd pain. has ng tube without any blood .  seen by GI ( had ct abdomen showed ceacal diverticulosis, diverticulitis/inflam, // r/o malignancy) and as per gi no colonoscopy at this time   watch hgb  surgery to f/u.  GI to f/u everyday and prn.  plateletes are slightly low  sec to BT????    INTERVAL HPI/OVERNIGHT EVENTS:  T(C): 37.1 (09-12-17 @ 08:00), Max: 37.5 (09-12-17 @ 00:00)  HR: 84 (09-12-17 @ 09:00) (66 - 110)  BP: 108/68 (09-12-17 @ 04:00) (73/40 - 165/82)  RR: 15 (09-12-17 @ 09:00) (0 - 25)  SpO2: 100% (09-12-17 @ 09:00) (96% - 100%)  Wt(kg): --  I&O's Summary    11 Sep 2017 07:01  -  12 Sep 2017 07:00  --------------------------------------------------------  IN: 9520.6 mL / OUT: 2715 mL / NET: 6805.6 mL        REVIEW OF SYSTEMS: denies fever, chills, SOB, palpitations, chest pain, abdominal pain, nausea, vomitting, diarrhea, constipation, dizziness    MEDICATIONS  (STANDING):  atorvastatin 40 milliGRAM(s) Oral at bedtime  pantoprazole  Injectable 40 milliGRAM(s) IV Push two times a day  dextrose 5% + sodium chloride 0.9%. 1000 milliLiter(s) (100 mL/Hr) IV Continuous <Continuous>  insulin lispro (HumaLOG) corrective regimen sliding scale   SubCutaneous every 6 hours  ciprofloxacin   IVPB      metroNIDAZOLE    Tablet 500 milliGRAM(s) Oral every 8 hours  ciprofloxacin   IVPB 400 milliGRAM(s) IV Intermittent every 12 hours    MEDICATIONS  (PRN):      PHYSICAL EXAM:  GENERAL: NAD, well-groomed, well-developed  HEAD:  Atraumatic, Normocephalic  EYES: EOMI, PERRLA, conjunctiva and sclera clear  ENMT: No tonsillar erythema, exudates, or enlargement; Moist mucous membranes, Good dentition, No lesions  NECK: Supple, No JVD, Normal thyroid  NERVOUS SYSTEM:  Alert & Oriented X3, Good concentration; Motor Strength 5/5 B/L upper and lower extremities; DTRs 2+ intact and symmetric  CHEST/LUNG: Clear to percussion bilaterally; No rales, rhonchi, wheezing, or rubs  HEART: Regular rate and rhythm; No murmurs, rubs, or gallops  ABDOMEN: Soft, Nontender, Nondistended; Bowel sounds present  EXTREMITIES:  2+ Peripheral Pulses, No clubbing, cyanosis, or edema  LYMPH: No lymphadenopathy noted  SKIN: No rashes or lesions  LABS:                        8.4    8.2   )-----------( 132      ( 12 Sep 2017 06:35 )             23.8     09-12    144  |  112<H>  |  13  ----------------------------<  176<H>  3.7   |  28  |  0.93    Ca    7.3<L>      12 Sep 2017 06:35  Phos  2.0     09-12  Mg     1.6     09-12    TPro  4.6<L>  /  Alb  2.4<L>  /  TBili  1.2  /  DBili  x   /  AST  11  /  ALT  18  /  AlkPhos  47  09-11    PT/INR - ( 10 Sep 2017 20:51 )   PT: 13.4 sec;   INR: 1.22 ratio         PTT - ( 10 Sep 2017 20:51 )  PTT:24.9 sec    CAPILLARY BLOOD GLUCOSE  178 (12 Sep 2017 05:30)  165 (11 Sep 2017 23:00)  161 (11 Sep 2017 18:30)  116 (11 Sep 2017 12:00)  118 (11 Sep 2017 11:00)          ABG - ( 11 Sep 2017 14:06 )  pH: 7.34  /  pCO2: 36    /  pO2: 146   / HCO3: 19    / Base Excess: -5.5  /  SaO2: 99

## 2017-09-12 NOTE — PROGRESS NOTE ADULT - SUBJECTIVE AND OBJECTIVE BOX
INTERVAL HPI/ OVERNIGHT EVENTS: No active bleed overnight. vitals stable and H/H trending down. Will transfuse as necessary.         Antimicrobial:  ciprofloxacin   IVPB      metroNIDAZOLE    Tablet 500 milliGRAM(s) Oral every 8 hours  ciprofloxacin   IVPB 400 milliGRAM(s) IV Intermittent every 12 hours      Other:  atorvastatin 40 milliGRAM(s) Oral at bedtime  pantoprazole  Injectable 40 milliGRAM(s) IV Push two times a day  dextrose 5% + sodium chloride 0.9%. 1000 milliLiter(s) IV Continuous <Continuous>  insulin lispro (HumaLOG) corrective regimen sliding scale   SubCutaneous every 6 hours    atorvastatin 40 milliGRAM(s) Oral at bedtime  pantoprazole  Injectable 40 milliGRAM(s) IV Push two times a day  dextrose 5% + sodium chloride 0.9%. 1000 milliLiter(s) IV Continuous <Continuous>  insulin lispro (HumaLOG) corrective regimen sliding scale   SubCutaneous every 6 hours  ciprofloxacin   IVPB      metroNIDAZOLE    Tablet 500 milliGRAM(s) Oral every 8 hours  ciprofloxacin   IVPB 400 milliGRAM(s) IV Intermittent every 12 hours    Drug Dosing Weight    Weight (kg): 60 (11 Sep 2017 13:19)    CENTRAL LINE: [x ] YES [ ] NO  LOCATION:   DATE INSERTED:  REMOVE: [ ] YES [ ] NO  EXPLAIN:    [place]: [x ] YES [ ] NO    DATE INSERTED:  REMOVE:  [ ] YES [ ] NO  EXPLAIN:    A-LINE:  [x ] YES [ ] NO  LOCATION: left femoral  DATE INSERTED:  REMOVE:  [ ] YES [ ] NO  EXPLAIN:    PMH -reviewed admission note, no change since admission      ICU Vital Signs Last 24 Hrs  T(C): 36.6 (12 Sep 2017 06:00), Max: 37.5 (12 Sep 2017 00:00)  T(F): 97.9 (12 Sep 2017 06:00), Max: 99.5 (12 Sep 2017 00:00)  HR: 74 (12 Sep 2017 07:30) (66 - 110)  BP: 108/68 (12 Sep 2017 04:00) (73/40 - 165/82)  BP(mean): 77 (12 Sep 2017 04:00) (47 - 104)  ABP: 132/58 (12 Sep 2017 07:30) (108/56 - 191/84)  ABP(mean): 84 (12 Sep 2017 07:30) (0 - 125)  RR: 12 (12 Sep 2017 07:30) (0 - 25)  SpO2: 100% (12 Sep 2017 07:30) (96% - 100%)      ABG - ( 11 Sep 2017 14:06 )  pH: 7.34  /  pCO2: 36    /  pO2: 146   / HCO3: 19    / Base Excess: -5.5  /  SaO2: 99                    09-11 @ 07:01  -  09-12 @ 07:00  --------------------------------------------------------  IN: 9520.6 mL / OUT: 2715 mL / NET: 6805.6 mL            PHYSICAL EXAM:    CONSTITUTIONAL: No fever, weight loss, or fatigue  	EYES: No eye pain, visual disturbances, or discharge  	ENMT:  No difficulty hearing, tinnitus, vertigo; No sinus or throat pain  	NECK: No pain or stiffness  	RESPIRATORY: No cough, wheezing, chills or hemoptysis; No shortness of breath  	CARDIOVASCULAR: syncope, dizziness - no chest pain, no palpitations  	GASTROINTESTINAL: fecal incontinence with bloody bowel movement  	GENITOURINARY: No dysuria, frequency, hematuria, or incontinence  	NEUROLOGICAL: No headaches, memory loss, loss of strength, numbness, or tremors  	SKIN: No itching, burning, rashes, or lesions   	LYMPH NODES: No enlarged glands  	ENDOCRINE: No heat or cold intolerance; No hair loss  	MUSCULOSKELETAL: No joint pain or swelling; No muscle, back, or extremity pain  	PSYCHIATRIC: No depression, anxiety, mood swings, or difficulty sleeping  	HEME/LYMPH: No easy bruising, or bleeding gums  ALLERY AND IMMUNOLOGIC: No hives or eczema          LABS:  CBC Full  -  ( 12 Sep 2017 06:35 )  WBC Count : 8.2 K/uL  Hemoglobin : 8.4 g/dL  Hematocrit : 23.8 %  Platelet Count - Automated : 132 K/uL  Mean Cell Volume : 84.3 fl  Mean Cell Hemoglobin : 29.9 pg  Mean Cell Hemoglobin Concentration : 35.5 gm/dL    09-12    144  |  112<H>  |  13  ----------------------------<  176<H>  3.7   |  28  |  0.93    Ca    7.3<L>      12 Sep 2017 06:35  Phos  2.0     09-12  Mg     1.6     09-12    TPro  4.6<L>  /  Alb  2.4<L>  /  TBili  1.2  /  DBili  x   /  AST  11  /  ALT  18  /  AlkPhos  47  09-11    PT/INR - ( 10 Sep 2017 20:51 )   PT: 13.4 sec;   INR: 1.22 ratio         PTT - ( 10 Sep 2017 20:51 )  PTT:24.9 sec        RADIOLOGY & ADDITIONAL STUDIES REVIEWED:  ***    [ ]GOALS OF CARE DISCUSSION WITH PATIENT/FAMILY/PROXY:    CRITICAL CARE TIME SPENT: 35 minutes

## 2017-09-12 NOTE — PROGRESS NOTE ADULT - SUBJECTIVE AND OBJECTIVE BOX
[ X  ] ICU               [   ] CCU             [   ] Medical Floor      Patient  remains in ICU for monitoring.  Patient is comfortable and no further rectal bleeding reported since yesterday afternoon.  No abdominal pain, N/V, hematemesis, hematochezia, melena, fever, chills, chest pain, SOB, cough or diarrhea reported.    VITALS  ICU Vital Signs Last 24 Hrs  T(C): 37.1 (12 Sep 2017 08:00), Max: 37.5 (12 Sep 2017 00:00)  T(F): 98.7 (12 Sep 2017 08:00), Max: 99.5 (12 Sep 2017 00:00)  HR: 76 (12 Sep 2017 11:30) (66 - 110)  BP: 122/60 (12 Sep 2017 10:00) (92/55 - 165/82)  BP(mean): 75 (12 Sep 2017 10:00) (57 - 104)  ABP: 141/61 (12 Sep 2017 11:30) (108/56 - 191/84)  ABP(mean): 90 (12 Sep 2017 11:30) (0 - 125)  RR: 11 (12 Sep 2017 11:30) (0 - 25)  SpO2: 100% (12 Sep 2017 11:30) (96% - 100%)         MEDICATIONS  (STANDING):  atorvastatin 40 milliGRAM(s) Oral at bedtime  pantoprazole  Injectable 40 milliGRAM(s) IV Push two times a day  insulin lispro (HumaLOG) corrective regimen sliding scale   SubCutaneous every 6 hours  ciprofloxacin   IVPB      metroNIDAZOLE    Tablet 500 milliGRAM(s) Oral every 8 hours  ciprofloxacin   IVPB 400 milliGRAM(s) IV Intermittent every 12 hours  dextrose 5% + sodium chloride 0.9%. 1000 milliLiter(s) (50 mL/Hr) IV Continuous <Continuous>    MEDICATIONS  (PRN):                            8.2    7.5   )-----------( 127      ( 12 Sep 2017 11:22 )             22.5       09-12    144  |  112<H>  |  13  ----------------------------<  176<H>  3.7   |  28  |  0.93    Ca    7.3<L>      12 Sep 2017 06:35  Phos  2.0     09-12  Mg     1.6     09-12    TPro  4.6<L>  /  Alb  2.4<L>  /  TBili  1.2  /  DBili  x   /  AST  11  /  ALT  18  /  AlkPhos  47  09-11    PT/INR - ( 10 Sep 2017 20:51 )   PT: 13.4 sec;   INR: 1.22 ratio       PTT - ( 10 Sep 2017 20:51 )  PTT:24.9 sec      EXAM:  CT ANGIO ABD PELV (W)AW IC                            PROCEDURE DATE:  09/11/2017          INTERPRETATION:  CLINICAL INFORMATION: Lower GI bleed, evaluate for   diverticular bleed    COMPARISON: None    PROCEDURE:   CT of the Abdomen and Pelvis was performed with and without intravenous   contrast.  Precontrast, Arterial and Delayed phases were performed.  Intravenous contrast: 90 ml Omnipaque 350. 10 ml discarded.  Oral contrast: None.  Sagittal and coronal reformats were performed.    FINDINGS:    LOWER CHEST: There is small bibasilar atelectasis.    Please note that the study is slightly limited by streak artifact from   the patient's arms.   LIVER: Within normal limits.  BILE DUCTS: Normal caliber.  GALLBLADDER: Within normal limits.  SPLEEN: Within normal limits.  PANCREAS: Within normal limits.  ADRENALS: Within normal limits.  KIDNEYS/URETERS: There are punctate nonobstructing renal calculi. No   hydronephrosis..    BLADDER: Underdistended. Colnó catheter is seen within the urinary   bladder.  REPRODUCTIVE ORGANS: Prostate and seminal vesicles are within normal   limits.    BOWEL: No bowel obstruction. There multiple cecal diverticula with mild   pericecal fat stranding. Multiple subcentimeter right lower quadrant   mesenteric nodes are also noted. Appendix is normal.  PERITONEUM: No ascites.  VESSELS:  No active arterial extravasation identified. There is a   left-sided femoral arterial line in place.  RETROPERITONEUM: No lymphadenopathy.    ABDOMINAL WALL: Withinnormal limits.  BONES: Within normal limits.    IMPRESSION:     No active bleeding identified.    Cecal diverticula with mild pericecal inflammatory change which may   represent cecal diverticulitis. Correlation with colonoscopy is   recommended after the completion of treatment to exclude underlying   neoplasm.    Additional incidental findings as above.                    HORACE RYAN M.D., ATTENDING RADIOLOGIST  This document has been electronically signed. Sep 11 2017  4:04PM

## 2017-09-12 NOTE — PROGRESS NOTE ADULT - SUBJECTIVE AND OBJECTIVE BOX
Pt s- complaints. last blody bm yesterday am.  VSS  Abd; soft nt nd                        8.4    8.2   )-----------( 132      ( 12 Sep 2017 06:35 )             23.8     GIB  clinically stable at present  s/p FFP and PRBC transfused  await GI for Endoscopic eval/Rx.

## 2017-09-12 NOTE — PROGRESS NOTE ADULT - ASSESSMENT
watch hgb.  GI/Surgery f/u.  when stable GI procedure ( egd/ colonoscopy)   on cipr/flag antibx  dm  accuchecks with coverage.  gilbert boots

## 2017-09-12 NOTE — PROGRESS NOTE ADULT - ASSESSMENT
1. Cecal diverticulitis  2. Anemia  3. GI bleeding    Suggestions:    1. Monitor H/H closely  2. Transfuse PRBC as needed  3. IV antibiotics  4. High risk for colonoscopy due to diverticulitis  5. Surgical follow up  6. Protonix daily  7. Avoid NSAID's  8. DVT prophylaxis

## 2017-09-12 NOTE — PROGRESS NOTE ADULT - PROBLEM SELECTOR PLAN 1
Lower GI bleed of unclear etiology, possibly diverticulosis bleed. Also with hemorrhagic shock and symptomatic anemia from blood loss.  -S/p IV NS boluses x3, Transfusion of total 6 PRBCs+ 4FFP.   -monitor CBC q6hrs for now. NPO  -GI and surgery consulted.  -No aspirin or anticoagulants or heparin/  Lovenox at this time in view of GI bleed.  -CT scan negative for bleed but concern for cecal diverticulitis. Gi follow up and possible colonoscopy.   -NGT with no evident of upper GI bleed.

## 2017-09-13 LAB
ANION GAP SERPL CALC-SCNC: 7 MMOL/L — SIGNIFICANT CHANGE UP (ref 5–17)
BUN SERPL-MCNC: 7 MG/DL — SIGNIFICANT CHANGE UP (ref 7–18)
CALCIUM SERPL-MCNC: 8.1 MG/DL — LOW (ref 8.4–10.5)
CHLORIDE SERPL-SCNC: 111 MMOL/L — HIGH (ref 96–108)
CO2 SERPL-SCNC: 28 MMOL/L — SIGNIFICANT CHANGE UP (ref 22–31)
CREAT SERPL-MCNC: 0.9 MG/DL — SIGNIFICANT CHANGE UP (ref 0.5–1.3)
GLUCOSE SERPL-MCNC: 148 MG/DL — HIGH (ref 70–99)
HCT VFR BLD CALC: 21.9 % — LOW (ref 39–50)
HCT VFR BLD CALC: 23.6 % — LOW (ref 39–50)
HCT VFR BLD CALC: 25.2 % — LOW (ref 39–50)
HGB BLD-MCNC: 7.7 G/DL — LOW (ref 13–17)
HGB BLD-MCNC: 8.2 G/DL — LOW (ref 13–17)
HGB BLD-MCNC: 8.6 G/DL — LOW (ref 13–17)
MAGNESIUM SERPL-MCNC: 1.8 MG/DL — SIGNIFICANT CHANGE UP (ref 1.6–2.6)
MCHC RBC-ENTMCNC: 29.1 PG — SIGNIFICANT CHANGE UP (ref 27–34)
MCHC RBC-ENTMCNC: 29.7 PG — SIGNIFICANT CHANGE UP (ref 27–34)
MCHC RBC-ENTMCNC: 29.8 PG — SIGNIFICANT CHANGE UP (ref 27–34)
MCHC RBC-ENTMCNC: 34.2 GM/DL — SIGNIFICANT CHANGE UP (ref 32–36)
MCHC RBC-ENTMCNC: 34.9 GM/DL — SIGNIFICANT CHANGE UP (ref 32–36)
MCHC RBC-ENTMCNC: 35 GM/DL — SIGNIFICANT CHANGE UP (ref 32–36)
MCV RBC AUTO: 85 FL — SIGNIFICANT CHANGE UP (ref 80–100)
MCV RBC AUTO: 85.3 FL — SIGNIFICANT CHANGE UP (ref 80–100)
MCV RBC AUTO: 85.4 FL — SIGNIFICANT CHANGE UP (ref 80–100)
PHOSPHATE SERPL-MCNC: 2.6 MG/DL — SIGNIFICANT CHANGE UP (ref 2.5–4.5)
PLATELET # BLD AUTO: 154 K/UL — SIGNIFICANT CHANGE UP (ref 150–400)
PLATELET # BLD AUTO: 174 K/UL — SIGNIFICANT CHANGE UP (ref 150–400)
PLATELET # BLD AUTO: 175 K/UL — SIGNIFICANT CHANGE UP (ref 150–400)
POTASSIUM SERPL-MCNC: 3.7 MMOL/L — SIGNIFICANT CHANGE UP (ref 3.5–5.3)
POTASSIUM SERPL-SCNC: 3.7 MMOL/L — SIGNIFICANT CHANGE UP (ref 3.5–5.3)
RBC # BLD: 2.58 M/UL — LOW (ref 4.2–5.8)
RBC # BLD: 2.76 M/UL — LOW (ref 4.2–5.8)
RBC # BLD: 2.95 M/UL — LOW (ref 4.2–5.8)
RBC # FLD: 12.5 % — SIGNIFICANT CHANGE UP (ref 10.3–14.5)
RBC # FLD: 12.7 % — SIGNIFICANT CHANGE UP (ref 10.3–14.5)
RBC # FLD: 12.9 % — SIGNIFICANT CHANGE UP (ref 10.3–14.5)
SODIUM SERPL-SCNC: 146 MMOL/L — HIGH (ref 135–145)
WBC # BLD: 10.2 K/UL — SIGNIFICANT CHANGE UP (ref 3.8–10.5)
WBC # BLD: 8.7 K/UL — SIGNIFICANT CHANGE UP (ref 3.8–10.5)
WBC # BLD: 8.8 K/UL — SIGNIFICANT CHANGE UP (ref 3.8–10.5)
WBC # FLD AUTO: 10.2 K/UL — SIGNIFICANT CHANGE UP (ref 3.8–10.5)
WBC # FLD AUTO: 8.7 K/UL — SIGNIFICANT CHANGE UP (ref 3.8–10.5)
WBC # FLD AUTO: 8.8 K/UL — SIGNIFICANT CHANGE UP (ref 3.8–10.5)

## 2017-09-13 RX ORDER — METRONIDAZOLE 500 MG
500 TABLET ORAL ONCE
Qty: 0 | Refills: 0 | Status: COMPLETED | OUTPATIENT
Start: 2017-09-13 | End: 2017-09-13

## 2017-09-13 RX ORDER — METRONIDAZOLE 500 MG
TABLET ORAL
Qty: 0 | Refills: 0 | Status: DISCONTINUED | OUTPATIENT
Start: 2017-09-13 | End: 2017-09-20

## 2017-09-13 RX ORDER — METRONIDAZOLE 500 MG
500 TABLET ORAL EVERY 8 HOURS
Qty: 0 | Refills: 0 | Status: DISCONTINUED | OUTPATIENT
Start: 2017-09-13 | End: 2017-09-20

## 2017-09-13 RX ADMIN — ATORVASTATIN CALCIUM 40 MILLIGRAM(S): 80 TABLET, FILM COATED ORAL at 21:55

## 2017-09-13 RX ADMIN — Medication 200 MILLIGRAM(S): at 05:16

## 2017-09-13 RX ADMIN — Medication 200 MILLIGRAM(S): at 18:54

## 2017-09-13 RX ADMIN — PANTOPRAZOLE SODIUM 40 MILLIGRAM(S): 20 TABLET, DELAYED RELEASE ORAL at 18:38

## 2017-09-13 RX ADMIN — Medication 100 MILLIGRAM(S): at 13:42

## 2017-09-13 RX ADMIN — Medication 1 DROP(S): at 18:38

## 2017-09-13 RX ADMIN — SODIUM CHLORIDE 50 MILLILITER(S): 9 INJECTION, SOLUTION INTRAVENOUS at 14:50

## 2017-09-13 RX ADMIN — Medication 100 MILLIGRAM(S): at 21:55

## 2017-09-13 RX ADMIN — Medication 1: at 11:50

## 2017-09-13 RX ADMIN — Medication 500 MILLIGRAM(S): at 05:16

## 2017-09-13 RX ADMIN — Medication 100 MILLIGRAM(S): at 09:16

## 2017-09-13 RX ADMIN — PANTOPRAZOLE SODIUM 40 MILLIGRAM(S): 20 TABLET, DELAYED RELEASE ORAL at 05:16

## 2017-09-13 NOTE — PROGRESS NOTE ADULT - PROBLEM SELECTOR PLAN 1
-resolved. s/p code fusion.   Lower GI bleed of unclear etiology, possibly diverticulosis bleed. Also with hemorrhagic shock and symptomatic anemia from blood loss.  -S/p IV NS boluses x3, Transfusion of total 6 PRBCs+ 4FFP.   -monitor CBC q8hrs for now. NPO  -GI and surgery consulted.  -No aspirin or anticoagulants or heparin/  Lovenox at this time in view of GI bleed.  -CT scan negative for bleed but concern for cecal diverticulitis.   No colonoscopy as per GI (due to CT possible diverticulitis without abd tenderness)  -NGT with no evident of upper GI bleed.

## 2017-09-13 NOTE — PROGRESS NOTE ADULT - SUBJECTIVE AND OBJECTIVE BOX
[  X ] ICU              [   ] CCU                 [   ] Medical Floor    Patient remain in ICU for monitoring. He is comfortable. No new complaints reported. No abdominal pain, N/V, hematemesis, hematochezia, melena, fever, chills, chest pain, SOB, cough or diarrhea reported.    VITALS  ICU Vital Signs Last 24 Hrs  T(C): 36.8 (13 Sep 2017 16:06), Max: 37.4 (12 Sep 2017 20:26)  T(F): 98.2 (13 Sep 2017 16:06), Max: 99.3 (12 Sep 2017 20:26)  HR: 67 (13 Sep 2017 15:00) (65 - 86)  BP: 118/66 (13 Sep 2017 15:00) (102/56 - 136/73)  BP(mean): 80 (13 Sep 2017 15:00) (67 - 89)  ABP: 134/55 (13 Sep 2017 10:48) (113/53 - 159/67)  ABP(mean): -5 (13 Sep 2017 11:00) (-5 - 124)  RR: 16 (13 Sep 2017 15:00) (11 - 26)  SpO2: 98% (13 Sep 2017 15:00) (98% - 100%)         MEDICATIONS  (STANDING):  atorvastatin 40 milliGRAM(s) Oral at bedtime  pantoprazole  Injectable 40 milliGRAM(s) IV Push two times a day  insulin lispro (HumaLOG) corrective regimen sliding scale   SubCutaneous every 6 hours  ciprofloxacin   IVPB      ciprofloxacin   IVPB 400 milliGRAM(s) IV Intermittent every 12 hours  dextrose 5% + sodium chloride 0.9%. 1000 milliLiter(s) (50 mL/Hr) IV Continuous <Continuous>  metroNIDAZOLE  IVPB      metroNIDAZOLE  IVPB 500 milliGRAM(s) IV Intermittent every 8 hours  artificial  tears Solution 1 Drop(s) Both EYES two times a day    MEDICATIONS  (PRN):                            8.6    8.8   )-----------( 174      ( 13 Sep 2017 14:59 )             25.2       09-13    146<H>  |  111<H>  |  7   ----------------------------<  148<H>  3.7   |  28  |  0.90    Ca    8.1<L>      13 Sep 2017 03:46  Phos  2.6     09-13  Mg     1.8     09-13

## 2017-09-13 NOTE — DIETITIAN INITIAL EVALUATION ADULT. - OTHER INFO
nutrition assessment for NPO status and ICU LOS; lives home with family; skin intact; NPO/clear liquid diet x 5 d since admission, nutrition assessment for NPO status and ICU LOS; lives home with family; skin intact; NPO/clear liquid diet x 5 d since admission; s/p Rapid Response 9/10/17 for active GI bleeding, transferred to ICU; no GI distress at present per RN, possible advance diet reported

## 2017-09-13 NOTE — PROGRESS NOTE ADULT - ASSESSMENT
possible diverticular bleed. GI to assess again.  i doubt is diverticulitis, as no abd pain and sudden onset of painless bleed.. surgery to f/u.  Na slightly high  encourage free water  repeat cbc and bmp.

## 2017-09-13 NOTE — PROGRESS NOTE ADULT - SUBJECTIVE AND OBJECTIVE BOX
INTERVAL HPI/OVERNIGHT EVENTS: no major event overnight and no active bleed and cbc stable.         Antimicrobial:  ciprofloxacin   IVPB      ciprofloxacin   IVPB 400 milliGRAM(s) IV Intermittent every 12 hours  metroNIDAZOLE  IVPB            Other:  atorvastatin 40 milliGRAM(s) Oral at bedtime  pantoprazole  Injectable 40 milliGRAM(s) IV Push two times a day  insulin lispro (HumaLOG) corrective regimen sliding scale   SubCutaneous every 6 hours  dextrose 5% + sodium chloride 0.9%. 1000 milliLiter(s) IV Continuous <Continuous>    atorvastatin 40 milliGRAM(s) Oral at bedtime  pantoprazole  Injectable 40 milliGRAM(s) IV Push two times a day  insulin lispro (HumaLOG) corrective regimen sliding scale   SubCutaneous every 6 hours  ciprofloxacin   IVPB      ciprofloxacin   IVPB 400 milliGRAM(s) IV Intermittent every 12 hours  dextrose 5% + sodium chloride 0.9%. 1000 milliLiter(s) IV Continuous <Continuous>  metroNIDAZOLE  IVPB        Drug Dosing Weight    Weight (kg): 60 (11 Sep 2017 13:19)    CENTRAL LINE: [x ] YES [ ] NO  LOCATION: R femoral line  DATE INSERTED:  REMOVE: [ ] YES [x ] NO  EXPLAIN:    [place]: [ ] YES [x ] NO    DATE INSERTED:  REMOVE:  [ ] YES [ ] NO  EXPLAIN:    A-LINE:  [x ] YES [ ] NO  LOCATION:   DATE INSERTED:  REMOVE:  [ ] YES [ ] NO  EXPLAIN:    PMH -reviewed admission note, no change since admission      ICU Vital Signs Last 24 Hrs  T(C): 36.7 (13 Sep 2017 07:25), Max: 37.5 (12 Sep 2017 15:39)  T(F): 98 (13 Sep 2017 07:25), Max: 99.5 (12 Sep 2017 15:39)  HR: 67 (13 Sep 2017 07:00) (67 - 87)  BP: 111/61 (13 Sep 2017 06:00) (104/68 - 136/73)  BP(mean): 74 (13 Sep 2017 06:00) (74 - 89)  ABP: 128/57 (13 Sep 2017 07:00) (113/53 - 165/71)  ABP(mean): 83 (13 Sep 2017 07:00) (76 - 124)  RR: 12 (13 Sep 2017 07:00) (11 - 21)  SpO2: 100% (13 Sep 2017 07:00) (100% - 100%)      ABG - ( 11 Sep 2017 14:06 )  pH: 7.34  /  pCO2: 36    /  pO2: 146   / HCO3: 19    / Base Excess: -5.5  /  SaO2: 99                    09-12 @ 07:01  -  09-13 @ 07:00  --------------------------------------------------------  IN: 2050 mL / OUT: 3975 mL / NET: -1925 mL            PHYSICAL EXAM:    CONSTITUTIONAL: No fever, weight loss, or fatigue  	EYES: No eye pain, visual disturbances, or discharge  	ENMT:  No difficulty hearing, tinnitus, vertigo; No sinus or throat pain  	NECK: No pain or stiffness  	RESPIRATORY: No cough, wheezing, chills or hemoptysis; No shortness of breath  	CARDIOVASCULAR: syncope, dizziness - no chest pain, no palpitations  	GASTROINTESTINAL: fecal incontinence with bloody bowel movement  	GENITOURINARY: No dysuria, frequency, hematuria, or incontinence  	NEUROLOGICAL: No headaches, memory loss, loss of strength, numbness, or tremors  	SKIN: No itching, burning, rashes, or lesions   	LYMPH NODES: No enlarged glands  	ENDOCRINE: No heat or cold intolerance; No hair loss  	MUSCULOSKELETAL: No joint pain or swelling; No muscle, back, or extremity pain  	PSYCHIATRIC: No depression, anxiety, mood swings, or difficulty sleeping  	HEME/LYMPH: No easy bruising, or bleeding gums  ALLERY AND IMMUNOLOGIC: No hives or eczema      LABS:  CBC Full  -  ( 13 Sep 2017 03:46 )  WBC Count : 8.7 K/uL  Hemoglobin : 8.2 g/dL  Hematocrit : 23.6 %  Platelet Count - Automated : 154 K/uL  Mean Cell Volume : 85.4 fl  Mean Cell Hemoglobin : 29.8 pg  Mean Cell Hemoglobin Concentration : 34.9 gm/dL    09-13    146<H>  |  111<H>  |  7   ----------------------------<  148<H>  3.7   |  28  |  0.90    Ca    8.1<L>      13 Sep 2017 03:46  Phos  2.6     09-13  Mg     1.8     09-13              [x ]GOALS OF CARE DISCUSSION WITH PATIENT/FAMILY/PROXY: With patient and family member at bedside translated for patient.     CRITICAL CARE TIME SPENT: 35 minutes

## 2017-09-13 NOTE — DIETITIAN INITIAL EVALUATION ADULT. - PROBLEM SELECTOR PLAN 2
Patient has concern for seizure since patient passed out and had incontinence  -however it would be atypical as patient had no convulsions, no postictal confusion  -will get EEG to evaluation  -no seizure medications for now

## 2017-09-13 NOTE — PROGRESS NOTE ADULT - ASSESSMENT
1. GI bleeding (stopped)  2. Anemia(H/H stable)  3. Diverticulitis    Suggestions:    1. Continue antibiotics  2. Monitor electrolytes  3. Monitor H/H  4. Protonix daily  5. Clear liquid diet  6. Avoid NSAID's  7. DVT prophylaxis

## 2017-09-13 NOTE — DIETITIAN INITIAL EVALUATION ADULT. - PROBLEM SELECTOR PLAN 1
Patient had syncope and collapsed for 10 mins - unknown source at the moment  -Orthostatics normal  -possibilities are Vasovagal, arrhythmia mediated, Seizure, or neurological event (TIA vs CVA)  -neuro is unlikely as patient has pristine neurological exam and doesn't have confusion - will get MRI/MRA for evaluation  -will keep on tele to monitor for arrhythmias  -Will get Echocardiogram to evaluate for LV function  -Seizure workup as per below  -Cardio consult  -will give aspirin and lipitor for now  -trend cardiac enzymes

## 2017-09-13 NOTE — PROGRESS NOTE ADULT - SUBJECTIVE AND OBJECTIVE BOX
Patient examined at bedside, no complaints.   No nausea, no vomiting, no abdominal pain  Pt is npo      ICU Vital Signs Last 24 Hrs  T(C): 36.7 (13 Sep 2017 07:25), Max: 37.5 (12 Sep 2017 15:39)  T(F): 98 (13 Sep 2017 07:25), Max: 99.5 (12 Sep 2017 15:39)  HR: 73 (13 Sep 2017 09:00) (65 - 87)  BP: 102/56 (13 Sep 2017 08:00) (102/56 - 136/73)  BP(mean): 67 (13 Sep 2017 08:00) (67 - 89)  ABP: 134/60 (13 Sep 2017 09:00) (113/53 - 165/71)  ABP(mean): 88 (13 Sep 2017 09:00) (76 - 124)  RR: 16 (13 Sep 2017 09:00) (11 - 20)  SpO2: 100% (13 Sep 2017 09:00) (100% - 100%)    elizondo in place with clear urine      Physical Exam  General: AAOx3, No acute distress  Skin: No jaundice, no icterus  Abdomen: soft, nontender, nondistended, no rebound tenderness, no guarding, no palpable masses  Extremities: non edematous, no calf pain bilaterally                           8.2    8.7   )-----------( 154      ( 13 Sep 2017 03:46 )             23.6   09-13    146<H>  |  111<H>  |  7   ----------------------------<  148<H>  3.7   |  28  |  0.90    Ca    8.1<L>      13 Sep 2017 03:46  Phos  2.6     09-13  Mg     1.8     09-13

## 2017-09-13 NOTE — PROGRESS NOTE ADULT - SUBJECTIVE AND OBJECTIVE BOX
HPI:  71M from home, still works PMH of DM, HTN, Kidney stones, HLD who presents to hospital for syncope, possible seizure while on the way to work and episode of bloody stool incontinence. Patient says he was on his way to work in the morning, on the train going to [place] and when he was getting off, he suddenly blacked out. As per friend who was with him, he was unconscious for 10 mins and when he woke up, he was very dizzy and had episode of stool incontinence that was very bloody. His friend told him that he had no convulsions, no period of confusion when he woke up, no tongue biting but that he was very sweaty. Patient denies any chest pain before, during or after the event. Friend got him to work, gave him change of clothes and then took him here to the hospital. (09 Sep 2017 17:45)  no furthur bleed, no dizzyness, no abd pain or nausea.  pt walked in ICU as per staff.  hgb8.2   as per gi no colonoscopy at this time as per my d/w ICU team.      Patient is a 71y old  Male who presents with a chief complaint of Passing out, fecal incontinence, blood in stool (09 Sep 2017 17:45)      INTERVAL HPI/OVERNIGHT EVENTS:  T(C): 37 (09-13-17 @ 12:00), Max: 37.5 (09-12-17 @ 15:39)  HR: 72 (09-13-17 @ 12:00) (65 - 87)  BP: 114/61 (09-13-17 @ 12:00) (102/56 - 136/73)  RR: 16 (09-13-17 @ 12:00) (11 - 26)  SpO2: 99% (09-13-17 @ 12:00) (98% - 100%)  Wt(kg): --  I&O's Summary    12 Sep 2017 07:01  -  13 Sep 2017 07:00  --------------------------------------------------------  IN: 2050 mL / OUT: 3975 mL / NET: -1925 mL    13 Sep 2017 07:01  -  13 Sep 2017 12:54  --------------------------------------------------------  IN: 300 mL / OUT: 300 mL / NET: 0 mL        REVIEW OF SYSTEMS: denies fever, chills, SOB, palpitations, chest pain, abdominal pain, nausea, vomitting, diarrhea, constipation, dizziness    MEDICATIONS  (STANDING):  atorvastatin 40 milliGRAM(s) Oral at bedtime  pantoprazole  Injectable 40 milliGRAM(s) IV Push two times a day  insulin lispro (HumaLOG) corrective regimen sliding scale   SubCutaneous every 6 hours  ciprofloxacin   IVPB      ciprofloxacin   IVPB 400 milliGRAM(s) IV Intermittent every 12 hours  dextrose 5% + sodium chloride 0.9%. 1000 milliLiter(s) (50 mL/Hr) IV Continuous <Continuous>  metroNIDAZOLE  IVPB      metroNIDAZOLE  IVPB 500 milliGRAM(s) IV Intermittent every 8 hours    MEDICATIONS  (PRN):      PHYSICAL EXAM:  GENERAL: NAD, well-groomed, well-developed  HEAD:  Atraumatic, Normocephalic  EYES: EOMI, PERRLA, conjunctiva and sclera clear  ENMT: No tonsillar erythema, exudates, or enlargement; Moist mucous membranes, Good dentition, No lesions  NECK: Supple, No JVD, Normal thyroid  NERVOUS SYSTEM:  Alert & Oriented X3, Good concentration; Motor Strength 5/5 B/L upper and lower extremities; DTRs 2+ intact and symmetric  CHEST/LUNG: Clear to percussion bilaterally; No rales, rhonchi, wheezing, or rubs  HEART: Regular rate and rhythm; No murmurs, rubs, or gallops  ABDOMEN: Soft, Nontender, Nondistended; Bowel sounds present  EXTREMITIES:  2+ Peripheral Pulses, No clubbing, cyanosis, or edema  LYMPH: No lymphadenopathy noted  SKIN: No rashes or lesions  LABS:                        8.2    8.7   )-----------( 154      ( 13 Sep 2017 03:46 )             23.6     09-13    146<H>  |  111<H>  |  7   ----------------------------<  148<H>  3.7   |  28  |  0.90    Ca    8.1<L>      13 Sep 2017 03:46  Phos  2.6     09-13  Mg     1.8     09-13          CAPILLARY BLOOD GLUCOSE  156 (13 Sep 2017 11:46)  168 (13 Sep 2017 05:00)  152 (12 Sep 2017 23:00)  161 (12 Sep 2017 18:00)          ABG - ( 11 Sep 2017 14:06 )  pH: 7.34  /  pCO2: 36    /  pO2: 146   / HCO3: 19    / Base Excess: -5.5  /  SaO2: 99

## 2017-09-14 LAB
ANION GAP SERPL CALC-SCNC: 6 MMOL/L — SIGNIFICANT CHANGE UP (ref 5–17)
APTT BLD: 25.3 SEC — LOW (ref 27.5–37.4)
BUN SERPL-MCNC: 10 MG/DL — SIGNIFICANT CHANGE UP (ref 7–18)
CALCIUM SERPL-MCNC: 7.9 MG/DL — LOW (ref 8.4–10.5)
CHLORIDE SERPL-SCNC: 108 MMOL/L — SIGNIFICANT CHANGE UP (ref 96–108)
CO2 SERPL-SCNC: 28 MMOL/L — SIGNIFICANT CHANGE UP (ref 22–31)
CREAT SERPL-MCNC: 0.92 MG/DL — SIGNIFICANT CHANGE UP (ref 0.5–1.3)
GLUCOSE SERPL-MCNC: 174 MG/DL — HIGH (ref 70–99)
HCT VFR BLD CALC: 27.5 % — LOW (ref 39–50)
HCT VFR BLD CALC: 28.2 % — LOW (ref 39–50)
HGB BLD-MCNC: 9.7 G/DL — LOW (ref 13–17)
HGB BLD-MCNC: 9.9 G/DL — LOW (ref 13–17)
INR BLD: 1.37 RATIO — HIGH (ref 0.88–1.16)
MAGNESIUM SERPL-MCNC: 1.7 MG/DL — SIGNIFICANT CHANGE UP (ref 1.6–2.6)
MCHC RBC-ENTMCNC: 30.1 PG — SIGNIFICANT CHANGE UP (ref 27–34)
MCHC RBC-ENTMCNC: 31 PG — SIGNIFICANT CHANGE UP (ref 27–34)
MCHC RBC-ENTMCNC: 35.1 GM/DL — SIGNIFICANT CHANGE UP (ref 32–36)
MCHC RBC-ENTMCNC: 35.2 GM/DL — SIGNIFICANT CHANGE UP (ref 32–36)
MCV RBC AUTO: 86 FL — SIGNIFICANT CHANGE UP (ref 80–100)
MCV RBC AUTO: 88.1 FL — SIGNIFICANT CHANGE UP (ref 80–100)
PHOSPHATE SERPL-MCNC: 3.4 MG/DL — SIGNIFICANT CHANGE UP (ref 2.5–4.5)
PLATELET # BLD AUTO: 156 K/UL — SIGNIFICANT CHANGE UP (ref 150–400)
PLATELET # BLD AUTO: 191 K/UL — SIGNIFICANT CHANGE UP (ref 150–400)
POTASSIUM SERPL-MCNC: 3.8 MMOL/L — SIGNIFICANT CHANGE UP (ref 3.5–5.3)
POTASSIUM SERPL-SCNC: 3.8 MMOL/L — SIGNIFICANT CHANGE UP (ref 3.5–5.3)
PROTHROM AB SERPL-ACNC: 15 SEC — HIGH (ref 9.8–12.7)
RBC # BLD: 3.12 M/UL — LOW (ref 4.2–5.8)
RBC # BLD: 3.28 M/UL — LOW (ref 4.2–5.8)
RBC # FLD: 12.5 % — SIGNIFICANT CHANGE UP (ref 10.3–14.5)
RBC # FLD: 12.6 % — SIGNIFICANT CHANGE UP (ref 10.3–14.5)
SODIUM SERPL-SCNC: 142 MMOL/L — SIGNIFICANT CHANGE UP (ref 135–145)
WBC # BLD: 8.1 K/UL — SIGNIFICANT CHANGE UP (ref 3.8–10.5)
WBC # BLD: 8.4 K/UL — SIGNIFICANT CHANGE UP (ref 3.8–10.5)
WBC # FLD AUTO: 8.1 K/UL — SIGNIFICANT CHANGE UP (ref 3.8–10.5)
WBC # FLD AUTO: 8.4 K/UL — SIGNIFICANT CHANGE UP (ref 3.8–10.5)

## 2017-09-14 PROCEDURE — 78278 ACUTE GI BLOOD LOSS IMAGING: CPT | Mod: 26

## 2017-09-14 PROCEDURE — 99223 1ST HOSP IP/OBS HIGH 75: CPT

## 2017-09-14 RX ORDER — SOD SULF/SODIUM/NAHCO3/KCL/PEG
4000 SOLUTION, RECONSTITUTED, ORAL ORAL ONCE
Qty: 0 | Refills: 0 | Status: COMPLETED | OUTPATIENT
Start: 2017-09-14 | End: 2017-09-14

## 2017-09-14 RX ORDER — HYDROMORPHONE HYDROCHLORIDE 2 MG/ML
0.5 INJECTION INTRAMUSCULAR; INTRAVENOUS; SUBCUTANEOUS EVERY 4 HOURS
Qty: 0 | Refills: 0 | Status: DISCONTINUED | OUTPATIENT
Start: 2017-09-14 | End: 2017-09-20

## 2017-09-14 RX ADMIN — HYDROMORPHONE HYDROCHLORIDE 0.5 MILLIGRAM(S): 2 INJECTION INTRAMUSCULAR; INTRAVENOUS; SUBCUTANEOUS at 11:05

## 2017-09-14 RX ADMIN — HYDROMORPHONE HYDROCHLORIDE 0.5 MILLIGRAM(S): 2 INJECTION INTRAMUSCULAR; INTRAVENOUS; SUBCUTANEOUS at 11:20

## 2017-09-14 RX ADMIN — HYDROMORPHONE HYDROCHLORIDE 0.5 MILLIGRAM(S): 2 INJECTION INTRAMUSCULAR; INTRAVENOUS; SUBCUTANEOUS at 22:43

## 2017-09-14 RX ADMIN — Medication 100 MILLIGRAM(S): at 05:02

## 2017-09-14 RX ADMIN — Medication 100 MILLIGRAM(S): at 14:43

## 2017-09-14 RX ADMIN — Medication 200 MILLIGRAM(S): at 17:54

## 2017-09-14 RX ADMIN — Medication 1 DROP(S): at 05:03

## 2017-09-14 RX ADMIN — Medication 200 MILLIGRAM(S): at 05:54

## 2017-09-14 RX ADMIN — ATORVASTATIN CALCIUM 40 MILLIGRAM(S): 80 TABLET, FILM COATED ORAL at 21:33

## 2017-09-14 RX ADMIN — PANTOPRAZOLE SODIUM 40 MILLIGRAM(S): 20 TABLET, DELAYED RELEASE ORAL at 17:55

## 2017-09-14 RX ADMIN — PANTOPRAZOLE SODIUM 40 MILLIGRAM(S): 20 TABLET, DELAYED RELEASE ORAL at 05:03

## 2017-09-14 RX ADMIN — Medication 4000 MILLILITER(S): at 14:43

## 2017-09-14 RX ADMIN — SODIUM CHLORIDE 50 MILLILITER(S): 9 INJECTION, SOLUTION INTRAVENOUS at 11:05

## 2017-09-14 RX ADMIN — Medication 100 MILLIGRAM(S): at 21:33

## 2017-09-14 RX ADMIN — Medication 1 DROP(S): at 17:55

## 2017-09-14 RX ADMIN — HYDROMORPHONE HYDROCHLORIDE 0.5 MILLIGRAM(S): 2 INJECTION INTRAMUSCULAR; INTRAVENOUS; SUBCUTANEOUS at 23:14

## 2017-09-14 NOTE — PROGRESS NOTE ADULT - SUBJECTIVE AND OBJECTIVE BOX
Pt seen at bedside  Patient is a 71y old  Male who presents with a chief complaint of Passing out, fecal incontinence, blood in stool (09 Sep 2017 17:45)      INTERVAL HPI/OVERNIGHT EVENTS:  Episode of bloody BM yesterday with hct drop. Pt transfused 2 units.  Pt states feels well this morning. No abdominal pain    Vital Signs Last 24 Hrs  T(C): 36.3 (14 Sep 2017 05:38), Max: 37 (13 Sep 2017 12:00)  T(F): 97.4 (14 Sep 2017 05:38), Max: 98.6 (13 Sep 2017 12:00)  HR: 60 (14 Sep 2017 09:00) (60 - 85)  BP: 120/64 (14 Sep 2017 09:00) (102/60 - 151/71)  BP(mean): 78 (14 Sep 2017 09:00) (63 - 92)  RR: 11 (14 Sep 2017 09:00) (10 - 26)  SpO2: 100% (14 Sep 2017 09:00) (98% - 100%)    Physical Exam:    Gen: awake alert oriented NAD  Abd: soft NT ND  Pelvic: elizondo catheter in place with clear urine      MEDICATIONS  (STANDING):  atorvastatin 40 milliGRAM(s) Oral at bedtime  pantoprazole  Injectable 40 milliGRAM(s) IV Push two times a day  insulin lispro (HumaLOG) corrective regimen sliding scale   SubCutaneous every 6 hours  ciprofloxacin   IVPB      ciprofloxacin   IVPB 400 milliGRAM(s) IV Intermittent every 12 hours  dextrose 5% + sodium chloride 0.9%. 1000 milliLiter(s) (50 mL/Hr) IV Continuous <Continuous>  metroNIDAZOLE  IVPB      metroNIDAZOLE  IVPB 500 milliGRAM(s) IV Intermittent every 8 hours  artificial  tears Solution 1 Drop(s) Both EYES two times a day    MEDICATIONS  (PRN):                            9.7    8.4   )-----------( 156      ( 14 Sep 2017 06:45 )             27.5     09-14    142  |  108  |  10  ----------------------------<  174<H>  3.8   |  28  |  0.92    Ca    7.9<L>      14 Sep 2017 06:45  Phos  3.4     09-14  Mg     1.7     09-14          I&O's Detail    13 Sep 2017 07:01  -  14 Sep 2017 07:00  --------------------------------------------------------  IN:    dextrose 5% + sodium chloride 0.9%.: 1200 mL    Oral Fluid: 300 mL    Packed Red Blood Cells: 350 mL    Solution: 400 mL    Solution: 400 mL  Total IN: 2650 mL    OUT:    Indwelling Catheter - Urethral: 300 mL    Voided: 1300 mL  Total OUT: 1600 mL    Total NET: 1050 mL

## 2017-09-14 NOTE — CONSULT NOTE ADULT - SUBJECTIVE AND OBJECTIVE BOX
Patient is a 71M from home with PMH of DM, HTN, Kidney stones, HLD who presents to hospital for syncope, possible seizure while on the way to work and episode of bloody stool incontinence. Admitted for syncope, seizure and lower GI bleed. Has received total of 8PRBC and 3 FFP. Patient is currently at bleeding scan, will interview and examine after patient is back in the unit.     INTERVAL HPI/OVERNIGHT EVENTS:  T(C): 36.6 (09-14-17 @ 08:00), Max: 37 (09-13-17 @ 12:00)  HR: 73 (09-14-17 @ 10:00) (60 - 78)  BP: 131/76 (09-14-17 @ 10:00) (102/60 - 151/71)  RR: 20 (09-14-17 @ 10:00) (10 - 21)  SpO2: 100% (09-14-17 @ 10:00) (98% - 100%)    13 Sep 2017 07:01  -  14 Sep 2017 07:00  --------------------------------------------------------  IN: 2650 mL / OUT: 1600 mL / NET: 1050 mL    14 Sep 2017 07:01  -  14 Sep 2017 11:50  --------------------------------------------------------  IN: 200 mL / OUT: 0 mL / NET: 200 mL    MEDICATIONS  (STANDING):  atorvastatin 40 milliGRAM(s) Oral at bedtime  pantoprazole  Injectable 40 milliGRAM(s) IV Push two times a day  insulin lispro (HumaLOG) corrective regimen sliding scale   SubCutaneous every 6 hours  ciprofloxacin   IVPB      ciprofloxacin   IVPB 400 milliGRAM(s) IV Intermittent every 12 hours  dextrose 5% + sodium chloride 0.9%. 1000 milliLiter(s) (50 mL/Hr) IV Continuous <Continuous>  metroNIDAZOLE  IVPB      metroNIDAZOLE  IVPB 500 milliGRAM(s) IV Intermittent every 8 hours  artificial  tears Solution 1 Drop(s) Both EYES two times a day  polyethylene glycol/electrolyte Solution. 4000 milliLiter(s) Oral once    MEDICATIONS  (PRN):  HYDROmorphone  Injectable 0.5 milliGRAM(s) IV Push every 4 hours PRN Moderate Pain (4 - 6)      LABS:                        9.7    8.4   )-----------( 156      ( 14 Sep 2017 06:45 )             27.5     09-14    142  |  108  |  10  ----------------------------<  174<H>  3.8   |  28  |  0.92    Ca    7.9<L>      14 Sep 2017 06:45  Phos  3.4     09-14  Mg     1.7     09-14    CAPILLARY BLOOD GLUCOSE  169 (14 Sep 2017 05:38)  166 (13 Sep 2017 23:00)  121 (13 Sep 2017 18:00)    < from: CT Angio Abdomen and Pelvis w/ IV Cont (09.11.17 @ 15:28) >  IMPRESSION:     No active bleeding identified.    Cecal diverticula with mild pericecal inflammatory change which may   represent cecal diverticulitis. Correlation with colonoscopy is   recommended after the completion of treatment to exclude underlying   neoplasm.    < end of copied text >    Bleeding scan: pending results Patient is a 71M from home with PMH of DM, HTN, Kidney stones, HLD who presents to hospital for syncope, possible seizure while on the way to work and episode of bloody stool incontinence. Admitted for syncope, seizure and lower GI bleed. Has received total of 8PRBC and 3 FFP. Patient is s/p bleeding scan today. Patient was seen and examined,  Rama [id number], reports feeling well, denies fever, chills, SOB, palpitations, nausea, vomiting, diarrhea or constipation with no episodes of BRBPR today. Denies smoking hx, EtOH or family Hx of GI malignancy or pathology. Agreeable to colonoscopy.    INTERVAL HPI/OVERNIGHT EVENTS:  T(C): 36.6 (09-14-17 @ 08:00), Max: 37 (09-13-17 @ 12:00)  HR: 73 (09-14-17 @ 10:00) (60 - 78)  BP: 131/76 (09-14-17 @ 10:00) (102/60 - 151/71)  RR: 20 (09-14-17 @ 10:00) (10 - 21)  SpO2: 100% (09-14-17 @ 10:00) (98% - 100%)    13 Sep 2017 07:01  -  14 Sep 2017 07:00  --------------------------------------------------------  IN: 2650 mL / OUT: 1600 mL / NET: 1050 mL    14 Sep 2017 07:01  -  14 Sep 2017 11:50  --------------------------------------------------------  IN: 200 mL / OUT: 0 mL / NET: 200 mL    MEDICATIONS  (STANDING):  atorvastatin 40 milliGRAM(s) Oral at bedtime  pantoprazole  Injectable 40 milliGRAM(s) IV Push two times a day  insulin lispro (HumaLOG) corrective regimen sliding scale   SubCutaneous every 6 hours  ciprofloxacin   IVPB      ciprofloxacin   IVPB 400 milliGRAM(s) IV Intermittent every 12 hours  dextrose 5% + sodium chloride 0.9%. 1000 milliLiter(s) (50 mL/Hr) IV Continuous <Continuous>  metroNIDAZOLE  IVPB      metroNIDAZOLE  IVPB 500 milliGRAM(s) IV Intermittent every 8 hours  artificial  tears Solution 1 Drop(s) Both EYES two times a day  polyethylene glycol/electrolyte Solution. 4000 milliLiter(s) Oral once    MEDICATIONS  (PRN):  HYDROmorphone  Injectable 0.5 milliGRAM(s) IV Push every 4 hours PRN Moderate Pain (4 - 6)    PHYSICAL EXAM:  GENERAL: NAD, well-groomed, well-developed  HEAD:  Atraumatic, Normocephalic  EYES: EOMI, PERRLA, conjunctiva and sclera clear  ENMT: No tonsillar erythema, exudates, or enlargement; Moist mucous membranes, Good dentition, No lesions  NECK: Supple, No JVD, Normal thyroid  NERVOUS SYSTEM:  Alert & Oriented X3, Good concentration; Motor Strength 5/5 B/L upper and lower extremities; DTRs 2+ intact and symmetric  CHEST/LUNG: Clear to percussion bilaterally; No rales, rhonchi, wheezing, or rubs  HEART: Regular rate and rhythm; No murmurs, rubs, or gallops  ABDOMEN: Soft, Nontender, Nondistended; Bowel sounds present  EXTREMITIES:  2+ Peripheral Pulses, No clubbing, cyanosis, or edema  LYMPH: No lymphadenopathy noted  SKIN: No rashes or lesions    LABS:                        9.7    8.4   )-----------( 156      ( 14 Sep 2017 06:45 )             27.5     09-14    142  |  108  |  10  ----------------------------<  174<H>  3.8   |  28  |  0.92    Ca    7.9<L>      14 Sep 2017 06:45  Phos  3.4     09-14  Mg     1.7     09-14    CAPILLARY BLOOD GLUCOSE  169 (14 Sep 2017 05:38)  166 (13 Sep 2017 23:00)  121 (13 Sep 2017 18:00)    < from: CT Angio Abdomen and Pelvis w/ IV Cont (09.11.17 @ 15:28) >  IMPRESSION:     No active bleeding identified.    Cecal diverticula with mild pericecal inflammatory change which may   represent cecal diverticulitis. Correlation with colonoscopy is   recommended after the completion of treatment to exclude underlying   neoplasm.    < end of copied text >    Bleeding scan: no active bleeding site Patient is a 71M from home with PMH of DM, HTN, Kidney stones, HLD who presents to hospital for syncope, possible seizure while on the way to work and episode of bloody stool incontinence. Admitted for syncope, seizure and lower GI bleed. Has received total of 8PRBC and 4 FFP. Patient is s/p bleeding scan today. Patient was seen and examined,  Rama [id number], reports feeling well, denies fever, chills, SOB, palpitations, nausea, vomiting, diarrhea or constipation with no episodes of BRBPR today. Denies smoking hx, EtOH or family Hx of GI malignancy or pathology. Agreeable to colonoscopy. No prior EGD or colonoscopy.    INTERVAL HPI/OVERNIGHT EVENTS:  T(C): 36.6 (09-14-17 @ 08:00), Max: 37 (09-13-17 @ 12:00)  HR: 73 (09-14-17 @ 10:00) (60 - 78)  BP: 131/76 (09-14-17 @ 10:00) (102/60 - 151/71)  RR: 20 (09-14-17 @ 10:00) (10 - 21)  SpO2: 100% (09-14-17 @ 10:00) (98% - 100%)    13 Sep 2017 07:01  -  14 Sep 2017 07:00  --------------------------------------------------------  IN: 2650 mL / OUT: 1600 mL / NET: 1050 mL    14 Sep 2017 07:01  -  14 Sep 2017 11:50  --------------------------------------------------------  IN: 200 mL / OUT: 0 mL / NET: 200 mL    MEDICATIONS  (STANDING):  atorvastatin 40 milliGRAM(s) Oral at bedtime  pantoprazole  Injectable 40 milliGRAM(s) IV Push two times a day  insulin lispro (HumaLOG) corrective regimen sliding scale   SubCutaneous every 6 hours  ciprofloxacin   IVPB      ciprofloxacin   IVPB 400 milliGRAM(s) IV Intermittent every 12 hours  dextrose 5% + sodium chloride 0.9%. 1000 milliLiter(s) (50 mL/Hr) IV Continuous <Continuous>  metroNIDAZOLE  IVPB      metroNIDAZOLE  IVPB 500 milliGRAM(s) IV Intermittent every 8 hours  artificial  tears Solution 1 Drop(s) Both EYES two times a day  polyethylene glycol/electrolyte Solution. 4000 milliLiter(s) Oral once    MEDICATIONS  (PRN):  HYDROmorphone  Injectable 0.5 milliGRAM(s) IV Push every 4 hours PRN Moderate Pain (4 - 6)    PHYSICAL EXAM:  GENERAL: NAD, well-groomed, well-developed  HEAD:  Atraumatic, Normocephalic  EYES: EOMI, PERRLA, conjunctiva and sclera clear  ENMT: No tonsillar erythema, exudates, or enlargement; Moist mucous membranes, Good dentition, No lesions  NECK: Supple, No JVD, Normal thyroid  NERVOUS SYSTEM:  Alert & Oriented X3, Good concentration; Motor Strength 5/5 B/L upper and lower extremities; DTRs 2+ intact and symmetric  CHEST/LUNG: Clear to percussion bilaterally; No rales, rhonchi, wheezing, or rubs  HEART: Regular rate and rhythm; No murmurs, rubs, or gallops  ABDOMEN: Soft, Nontender, Nondistended; Bowel sounds present  EXTREMITIES:  2+ Peripheral Pulses, No clubbing, cyanosis, or edema  LYMPH: No lymphadenopathy noted  SKIN: No rashes or lesions    LABS:                        9.7    8.4   )-----------( 156      ( 14 Sep 2017 06:45 )             27.5     09-14    142  |  108  |  10  ----------------------------<  174<H>  3.8   |  28  |  0.92    Ca    7.9<L>      14 Sep 2017 06:45  Phos  3.4     09-14  Mg     1.7     09-14    CAPILLARY BLOOD GLUCOSE  169 (14 Sep 2017 05:38)  166 (13 Sep 2017 23:00)  121 (13 Sep 2017 18:00)    < from: CT Angio Abdomen and Pelvis w/ IV Cont (09.11.17 @ 15:28) >  IMPRESSION:     No active bleeding identified.    Cecal diverticula with mild pericecal inflammatory change which may   represent cecal diverticulitis. Correlation with colonoscopy is   recommended after the completion of treatment to exclude underlying   neoplasm.    < end of copied text >    Bleeding scan: no active bleeding site

## 2017-09-14 NOTE — PROGRESS NOTE ADULT - ASSESSMENT
s/p recurrent Gi bleed  for bleeding scan then colonoscopy.  i doubt has significant element of diverticulitis ( seems like Diverticular bleed)  so second opinion. ( no abd tenderness

## 2017-09-14 NOTE — PROGRESS NOTE ADULT - PROBLEM SELECTOR PLAN 1
-resolved. s/p code fusion. s/p total 8PRBC 4FFP  Lower GI bleed- diverticulosis bleed complicated with diverticulitis. Also with hemorrhagic shock and symptomatic anemia from blood loss.  -monitor CBC q8hrs for now. NPO  -GI and surgery consulted.  -No aspirin or anticoagulants or heparin/  Lovenox at this time in view of GI bleed.  -CTA -negative for source of bleed. CT scan negative for bleed but concern for cecal diverticulitis.   No colonoscopy as per GI (due to CT possible diverticulitis without abd tenderness)  Not a candidate for colonoscopy as per GI  -NGT with no evident of upper GI bleed.  -supportive treatment for now.

## 2017-09-14 NOTE — CONSULT NOTE ADULT - PROBLEM SELECTOR RECOMMENDATION 9
s/p 8 PRBC and 3 FFP since admission  CT angio ab/pelvis: no active bleed on 9/11, but ?cecal diverticulitis recommending colonoscopy to r/o neoplasm after treatment  bleeding scan today  colonoscopy tomorrow s/p 8 PRBC and 3 FFP since admission  CT angio ab/pelvis: no active bleed on 9/11, but ?cecal diverticulitis recommending colonoscopy to r/o neoplasm after treatment  bleeding scan negative for active bleeding sites  colonoscopy tomorrow  NPO after midnight s/p 8 PRBC and 4 FFP since admission  CT angio ab/pelvis: no active bleed on 9/11, but ?cecal diverticulitis recommending colonoscopy to r/o neoplasm after treatment  bleeding scan negative for active bleeding sites  colonoscopy tomorrow  NPO after midnight s/p 8 PRBC and 4 FFP since admission  CT angio ab/pelvis: no active bleed on 9/11, but ?cecal diverticulitis recommending colonoscopy to r/o neoplasm after treatment  bleeding scan negative for active bleeding sites  colonoscopy tomorrow with prep tonight  NPO after midnight

## 2017-09-14 NOTE — PROGRESS NOTE ADULT - SUBJECTIVE AND OBJECTIVE BOX
HPI:  71M from home, still works PMH of DM, HTN, Kidney stones, HLD who presents to hospital for syncope, possible seizure while on the way to work and episode of bloody stool incontinence. Patient says he was on his way to work in the morning, on the train going to [employer] and when he was getting off, he suddenly blacked out. As per friend who was with him, he was unconscious for 10 mins and when he woke up, he was very dizzy and had episode of stool incontinence that was very bloody. His friend told him that he had no convulsions, no period of confusion when he woke up, no tongue biting but that he was very sweaty. Patient denies any chest pain before, during or after the event. Friend got him to work, gave him change of clothes and then took him here to the hospital. (09 Sep 2017 17:45)  HAD again episodes of Lower GI bleed  but without hypotension. recieved blood transfusion  for Bleeding scan  then second opinion for Colonoscopy  already Dr Villavicencio was cantacted.    Patient is a 71y old  Male who presents with a chief complaint of Passing out, fecal incontinence, blood in stool (09 Sep 2017 17:45)      INTERVAL HPI/OVERNIGHT EVENTS:  T(C): 36.6 (09-14-17 @ 08:00), Max: 37 (09-14-17 @ 01:00)  HR: 65 (09-14-17 @ 11:00) (60 - 78)  BP: 112/61 (09-14-17 @ 11:00) (102/60 - 151/71)  RR: 16 (09-14-17 @ 11:00) (10 - 21)  SpO2: 100% (09-14-17 @ 11:00) (98% - 100%)  Wt(kg): --  I&O's Summary    13 Sep 2017 07:01  -  14 Sep 2017 07:00  --------------------------------------------------------  IN: 2650 mL / OUT: 1600 mL / NET: 1050 mL    14 Sep 2017 07:01  -  14 Sep 2017 12:07  --------------------------------------------------------  IN: 200 mL / OUT: 0 mL / NET: 200 mL        REVIEW OF SYSTEMS: denies fever, chills, SOB, palpitations, chest pain, abdominal pain, nausea, vomitting, diarrhea, constipation, dizziness    MEDICATIONS  (STANDING):  atorvastatin 40 milliGRAM(s) Oral at bedtime  pantoprazole  Injectable 40 milliGRAM(s) IV Push two times a day  insulin lispro (HumaLOG) corrective regimen sliding scale   SubCutaneous every 6 hours  ciprofloxacin   IVPB      ciprofloxacin   IVPB 400 milliGRAM(s) IV Intermittent every 12 hours  dextrose 5% + sodium chloride 0.9%. 1000 milliLiter(s) (50 mL/Hr) IV Continuous <Continuous>  metroNIDAZOLE  IVPB      metroNIDAZOLE  IVPB 500 milliGRAM(s) IV Intermittent every 8 hours  artificial  tears Solution 1 Drop(s) Both EYES two times a day  polyethylene glycol/electrolyte Solution. 4000 milliLiter(s) Oral once    MEDICATIONS  (PRN):  HYDROmorphone  Injectable 0.5 milliGRAM(s) IV Push every 4 hours PRN Moderate Pain (4 - 6)      PHYSICAL EXAM:  GENERAL: NAD, well-groomed, well-developed  HEAD:  Atraumatic, Normocephalic  EYES: EOMI, PERRLA, conjunctiva and sclera clear  ENMT: No tonsillar erythema, exudates, or enlargement; Moist mucous membranes, Good dentition, No lesions  NECK: Supple, No JVD, Normal thyroid  NERVOUS SYSTEM:  Alert & Oriented X3, Good concentration; Motor Strength 5/5 B/L upper and lower extremities; DTRs 2+ intact and symmetric  CHEST/LUNG: Clear to percussion bilaterally; No rales, rhonchi, wheezing, or rubs  HEART: Regular rate and rhythm; No murmurs, rubs, or gallops  ABDOMEN: Soft, Nontender, Nondistended; Bowel sounds present  EXTREMITIES:  2+ Peripheral Pulses, No clubbing, cyanosis, or edema  LYMPH: No lymphadenopathy noted  SKIN: No rashes or lesions  LABS:                        9.7    8.4   )-----------( 156      ( 14 Sep 2017 06:45 )             27.5     09-14    142  |  108  |  10  ----------------------------<  174<H>  3.8   |  28  |  0.92    Ca    7.9<L>      14 Sep 2017 06:45  Phos  3.4     09-14  Mg     1.7     09-14          CAPILLARY BLOOD GLUCOSE  169 (14 Sep 2017 05:38)  166 (13 Sep 2017 23:00)  121 (13 Sep 2017 18:00)

## 2017-09-14 NOTE — CONSULT NOTE ADULT - ATTENDING COMMENTS
I was physically present for the key portions of the evaluation and management (E/M) service provided.  The patient was personally seen and examined at bedside.  I reviewed the resident's  H& P , ROS,  Exam, assessment and plan. VS and labs  were personally reviewed.   I agree with  the all of the above with the following additions:    Summary:              Thank you for your consultation and allowing  me to participate in the care of your patients. If you have further questions please contact me at [phone].
Patient seen and examined at bedside in 6S at 9.50PM upon activation of RRT.  This is a 71 year old man with PMH as listed above including HTN, Type 2 DM was admitted to the medicine floor for rectal bleed s/p syncope. RRT was called when he was noted to have passed large bloody bowel movements x2 and noted to be hypotensive after 2nd bloody BM. He complained of fatigue and dizziness and was immediately given IV fluid boluses pending availability of blood and transferred to ICU for further management and closer monitoring.  On evaluation, VS- BP initially 110/70mmhg and dropped to SBP 80's and then 60's and improved to 90's/60's with IV NS boluses x3 and 110's with 2 units of PRBCs, HR 90's and regular, RR 18 with no accessory muscle use, afebrile, elderly man, lethargic but awake, oriented x3, Lungs clear, abdomen- full, soft, no tenderness, +BS, rectal/genitourinary area stained with blood, ext- no edema,   LABS reviewed: Hb 10.3 but likely not true picture of current blood loss.  A/P  Lower GI bleed of unclear etiology, possibly diverticulosis bleed. Also with hemorrhagic shock and symptomatic anemia from blood loss.  -S/p IV NS boluses x3, Transfusion of 2 PRBCs.  -Repeat CBC post-transfusion and monitor CBC q6hrs for now.  -GI and surgery consulted.  -Unstable for transport to radiology suite for any diagnostic imaging at this time.  -TLC inserted for resuscitation including for vasopressor support should it be needed.  -No aspirin or anticoagulants or heparin/lovenox at this time in view of GI bleed.  -NPO for now.  -D/w family at bedside.  - Admitted to ICU.

## 2017-09-14 NOTE — PROGRESS NOTE ADULT - PROBLEM SELECTOR PLAN 1
1. rec to perform bleeding scan if patient has another bloody BM  2. pt will need colonoscopy  3. will follow  4. care as per ICU

## 2017-09-14 NOTE — CONSULT NOTE ADULT - ASSESSMENT
Patient is a 71M from home with PMH of DM, HTN, Kidney stones, HLD who presents to hospital for syncope, possible seizure while on the way to work and episode of bloody stool incontinence. Admitted for syncope, seizure and lower GI bleed.
71M from home PMH of DM, HTN, Kidney stones, HLD who presents to hospital for syncope 2/2 acute GI bleed
71M from home, still works PMH of DM, HTN, Kidney stones, HLD who presents to hospital for syncope, possible seizure while on the way to work and episode of bloody stool incontinence.  Pt had 2 RRTs today for 2 episodes of bright red bloody bowel movement and hypotension to 80s, s/p 3 litre boluses and 1 unit pRBC being given. ICU accepted the pt as he is hemodynamically unstable sec to active Lower GI bleed.
The etiology for anemia, hypotension associated with rectal bleeding is due to:  1. Diverticular bleeding  2. Rectal ulcer / Colitis  3. Upper GI bleeding with rapid passage    Suggestions:    1. Continue ICU monitoring  2. Monitor H/H closely  3. Transfuse PRBC as needed  4. NPO  5. Protonix IV  6. CT-Scan angio / Bleeding scan  7. Surgical evaluation  8. Patient is not stable for colonoscopy and anesthesia  9. Avoid NSAID's  10. DVT prophylaxis

## 2017-09-14 NOTE — PROGRESS NOTE ADULT - SUBJECTIVE AND OBJECTIVE BOX
INTERVAL HPI/OVERNIGHT EVENTS: Patient had a large blood bowel movement overnight and H/H drop to 7.7/29 and got 2U PRBC>         Antimicrobial:  ciprofloxacin   IVPB      ciprofloxacin   IVPB 400 milliGRAM(s) IV Intermittent every 12 hours  metroNIDAZOLE  IVPB      metroNIDAZOLE  IVPB 500 milliGRAM(s) IV Intermittent every 8 hours    Cardiovascular:    Pulmonary:    Hematalogic:    Other:  atorvastatin 40 milliGRAM(s) Oral at bedtime  pantoprazole  Injectable 40 milliGRAM(s) IV Push two times a day  insulin lispro (HumaLOG) corrective regimen sliding scale   SubCutaneous every 6 hours  dextrose 5% + sodium chloride 0.9%. 1000 milliLiter(s) IV Continuous <Continuous>  artificial  tears Solution 1 Drop(s) Both EYES two times a day    atorvastatin 40 milliGRAM(s) Oral at bedtime  pantoprazole  Injectable 40 milliGRAM(s) IV Push two times a day  insulin lispro (HumaLOG) corrective regimen sliding scale   SubCutaneous every 6 hours  ciprofloxacin   IVPB      ciprofloxacin   IVPB 400 milliGRAM(s) IV Intermittent every 12 hours  dextrose 5% + sodium chloride 0.9%. 1000 milliLiter(s) IV Continuous <Continuous>  metroNIDAZOLE  IVPB      metroNIDAZOLE  IVPB 500 milliGRAM(s) IV Intermittent every 8 hours  artificial  tears Solution 1 Drop(s) Both EYES two times a day    Drug Dosing Weight    Weight (kg): 60 (11 Sep 2017 13:19)    CENTRAL LINE: [ ] YES [ ] NO  LOCATION:   DATE INSERTED:  REMOVE: [ ] YES [ ] NO  EXPLAIN:    GARLAND: [ ] YES [ ] NO    DATE INSERTED:  REMOVE:  [ ] YES [ ] NO  EXPLAIN:    A-LINE:  [ ] YES [ ] NO  LOCATION:   DATE INSERTED:  REMOVE:  [ ] YES [ ] NO  EXPLAIN:    PMH -reviewed admission note, no change since admission  Heart faliure: acute [ ] chronic [ ] acute or chronic [ ] diastolic [ ] systolic [ ] combied systolic and diastolic[ ]  ERNA: ATN[ ] renal medullary necrosis [ ] CKD I [ ]CKDII [ ]CKD III [ ]CKD IV [ ]CKD V [ ]Other pathological lesions [ ]  Abdominal Nutrition Status: malnutrition [ ] cachexia [ ] morbid obesity/BMI=40 [ ] Supplement ordered [___________]     ICU Vital Signs Last 24 Hrs  T(C): 36.3 (14 Sep 2017 05:38), Max: 37 (13 Sep 2017 12:00)  T(F): 97.4 (14 Sep 2017 05:38), Max: 98.6 (13 Sep 2017 12:00)  HR: 62 (14 Sep 2017 06:00) (60 - 85)  BP: 119/63 (14 Sep 2017 06:00) (102/56 - 151/71)  BP(mean): 77 (14 Sep 2017 06:00) (63 - 92)  ABP: 134/55 (13 Sep 2017 10:48) (125/56 - 152/64)  ABP(mean): -5 (13 Sep 2017 11:00) (-5 - 104)  RR: 16 (14 Sep 2017 06:00) (10 - 26)  SpO2: 100% (14 Sep 2017 06:00) (98% - 100%)            09-13 @ 07:01  -  09-14 @ 07:00  --------------------------------------------------------  IN: 2650 mL / OUT: 1600 mL / NET: 1050 mL            PHYSICAL EXAM:    GENERAL: [ ]NAD, [ ]well-groomed, [ ]well-developed  HEAD:  [ ]Atraumatic, [ ]Normocephalic  EYES: [ ]EOMI, [ ]PERRLA, [ ]conjunctiva and sclera clear  ENMT: [ ]No tonsillar erythema, exudates, or enlargement; [ ]Moist mucous membranes, [ ]Good dentition, [ ]No lesions  NECK: [ ]Supple, normal appearance, [ ]No JVD; [ ]Normal thyroid; [ ]Trachea midline  NERVOUS SYSTEM:  [ ]Alert & Oriented X3, [ ]Good concentration; [ ]Motor Strength 5/5 B/L upper and lower extremities; [ ]DTRs 2+ intact and symmetric  CHEST/LUNG: [ ]No chest deformity; [ ]Normal percussion bilaterally; [ ]No rales, rhonchi, wheezing   HEART: [ ]Regular rate and rhythm; [ ]No murmurs, rubs, or gallops  ABDOMEN: [ ]Soft, Nontender, Nondistended; [ ]Bowel sounds present  EXTREMITIES:  [ ]2+ Peripheral Pulses, [ ]No clubbing, cyanosis, or edema  LYMPH: [ ]No lymphadenopathy noted  SKIN: [ ]No rashes or lesions; [ ]Good capillary refill      LABS:  CBC Full  -  ( 14 Sep 2017 06:45 )  WBC Count : 8.4 K/uL  Hemoglobin : 9.7 g/dL  Hematocrit : 27.5 %  Platelet Count - Automated : 156 K/uL  Mean Cell Volume : 88.1 fl  Mean Cell Hemoglobin : 31.0 pg  Mean Cell Hemoglobin Concentration : 35.2 gm/dL      09-13    146<H>  |  111<H>  |  7   ----------------------------<  148<H>  3.7   |  28  |  0.90    Ca    8.1<L>      13 Sep 2017 03:46  Phos  2.6     09-13  Mg     1.8     09-13              RADIOLOGY & ADDITIONAL STUDIES REVIEWED:  ***    [ ]GOALS OF CARE DISCUSSION WITH PATIENT/FAMILY/PROXY:    CRITICAL CARE TIME SPENT: 35 minutes INTERVAL HPI/OVERNIGHT EVENTS: Patient had a large blood bowel movement overnight and H/H drop to 7.7/29 and got 2U PRBC. H/H improved to 9.7/27.5 in AM.         Antimicrobial:  ciprofloxacin   IVPB      ciprofloxacin   IVPB 400 milliGRAM(s) IV Intermittent every 12 hours  metroNIDAZOLE  IVPB      metroNIDAZOLE  IVPB 500 milliGRAM(s) IV Intermittent every 8 hours        Other:  atorvastatin 40 milliGRAM(s) Oral at bedtime  pantoprazole  Injectable 40 milliGRAM(s) IV Push two times a day  insulin lispro (HumaLOG) corrective regimen sliding scale   SubCutaneous every 6 hours  dextrose 5% + sodium chloride 0.9%. 1000 milliLiter(s) IV Continuous <Continuous>  artificial  tears Solution 1 Drop(s) Both EYES two times a day    atorvastatin 40 milliGRAM(s) Oral at bedtime  pantoprazole  Injectable 40 milliGRAM(s) IV Push two times a day  insulin lispro (HumaLOG) corrective regimen sliding scale   SubCutaneous every 6 hours  ciprofloxacin   IVPB      ciprofloxacin   IVPB 400 milliGRAM(s) IV Intermittent every 12 hours  dextrose 5% + sodium chloride 0.9%. 1000 milliLiter(s) IV Continuous <Continuous>  metroNIDAZOLE  IVPB      metroNIDAZOLE  IVPB 500 milliGRAM(s) IV Intermittent every 8 hours  artificial  tears Solution 1 Drop(s) Both EYES two times a day    Drug Dosing Weight    Weight (kg): 60 (11 Sep 2017 13:19)    CENTRAL LINE: [x ] YES [ ] NO  LOCATION:   DATE INSERTED:  REMOVE: [ ] YES [ ] NO  EXPLAIN:    [place]: [ ] YES [x ] NO    DATE INSERTED:  REMOVE:  [ ] YES [ ] NO  EXPLAIN:    A-LINE:  [ ] YES [ x] NO  LOCATION:   DATE INSERTED:  REMOVE:  [ ] YES [ ] NO  EXPLAIN:    PMH -reviewed admission note, no change since admission       ICU Vital Signs Last 24 Hrs  T(C): 36.3 (14 Sep 2017 05:38), Max: 37 (13 Sep 2017 12:00)  T(F): 97.4 (14 Sep 2017 05:38), Max: 98.6 (13 Sep 2017 12:00)  HR: 62 (14 Sep 2017 06:00) (60 - 85)  BP: 119/63 (14 Sep 2017 06:00) (102/56 - 151/71)  BP(mean): 77 (14 Sep 2017 06:00) (63 - 92)  ABP: 134/55 (13 Sep 2017 10:48) (125/56 - 152/64)  ABP(mean): -5 (13 Sep 2017 11:00) (-5 - 104)  RR: 16 (14 Sep 2017 06:00) (10 - 26)  SpO2: 100% (14 Sep 2017 06:00) (98% - 100%)            09-13 @ 07:01  -  09-14 @ 07:00  --------------------------------------------------------  IN: 2650 mL / OUT: 1600 mL / NET: 1050 mL            PHYSICAL EXAM:    CONSTITUTIONAL: No fever, weight loss, or fatigue  	EYES: No eye pain, visual disturbances, or discharge  	ENMT:  No difficulty hearing, tinnitus, vertigo; No sinus or throat pain  	NECK: No pain or stiffness  	RESPIRATORY: No cough, wheezing, chills or hemoptysis; No shortness of breath  	CARDIOVASCULAR: syncope, dizziness - no chest pain, no palpitations  	GASTROINTESTINAL: fecal incontinence with bloody bowel movement  	GENITOURINARY: No dysuria, frequency, hematuria, or incontinence  	NEUROLOGICAL: No headaches, memory loss, loss of strength, numbness, or tremors  	SKIN: No itching, burning, rashes, or lesions   	LYMPH NODES: No enlarged glands  	ENDOCRINE: No heat or cold intolerance; No hair loss  	MUSCULOSKELETAL: No joint pain or swelling; No muscle, back, or extremity pain  	PSYCHIATRIC: No depression, anxiety, mood swings, or difficulty sleeping  	HEME/LYMPH: No easy bruising, or bleeding gums  ALLERY AND IMMUNOLOGIC: No hives or eczema        LABS:  CBC Full  -  ( 14 Sep 2017 06:45 )  WBC Count : 8.4 K/uL  Hemoglobin : 9.7 g/dL  Hematocrit : 27.5 %  Platelet Count - Automated : 156 K/uL  Mean Cell Volume : 88.1 fl  Mean Cell Hemoglobin : 31.0 pg  Mean Cell Hemoglobin Concentration : 35.2 gm/dL      09-13    146<H>  |  111<H>  |  7   ----------------------------<  148<H>  3.7   |  28  |  0.90    Ca    8.1<L>      13 Sep 2017 03:46  Phos  2.6     09-13  Mg     1.8     09-13          [x]GOALS OF CARE DISCUSSION WITH PATIENT/FAMILY/PROXY:    CRITICAL CARE TIME SPENT: 35 minutes

## 2017-09-15 LAB
ANION GAP SERPL CALC-SCNC: 7 MMOL/L — SIGNIFICANT CHANGE UP (ref 5–17)
BUN SERPL-MCNC: 11 MG/DL — SIGNIFICANT CHANGE UP (ref 7–18)
CALCIUM SERPL-MCNC: 8 MG/DL — LOW (ref 8.4–10.5)
CHLORIDE SERPL-SCNC: 107 MMOL/L — SIGNIFICANT CHANGE UP (ref 96–108)
CO2 SERPL-SCNC: 28 MMOL/L — SIGNIFICANT CHANGE UP (ref 22–31)
CREAT SERPL-MCNC: 0.92 MG/DL — SIGNIFICANT CHANGE UP (ref 0.5–1.3)
GLUCOSE SERPL-MCNC: 143 MG/DL — HIGH (ref 70–99)
HCT VFR BLD CALC: 25.8 % — LOW (ref 39–50)
HCT VFR BLD CALC: 26.9 % — LOW (ref 39–50)
HCT VFR BLD CALC: 27.6 % — LOW (ref 39–50)
HGB BLD-MCNC: 9.2 G/DL — LOW (ref 13–17)
HGB BLD-MCNC: 9.4 G/DL — LOW (ref 13–17)
HGB BLD-MCNC: 9.9 G/DL — LOW (ref 13–17)
MAGNESIUM SERPL-MCNC: 1.8 MG/DL — SIGNIFICANT CHANGE UP (ref 1.6–2.6)
MCHC RBC-ENTMCNC: 30.2 PG — SIGNIFICANT CHANGE UP (ref 27–34)
MCHC RBC-ENTMCNC: 30.9 PG — SIGNIFICANT CHANGE UP (ref 27–34)
MCHC RBC-ENTMCNC: 30.9 PG — SIGNIFICANT CHANGE UP (ref 27–34)
MCHC RBC-ENTMCNC: 35 GM/DL — SIGNIFICANT CHANGE UP (ref 32–36)
MCHC RBC-ENTMCNC: 35.6 GM/DL — SIGNIFICANT CHANGE UP (ref 32–36)
MCHC RBC-ENTMCNC: 35.8 GM/DL — SIGNIFICANT CHANGE UP (ref 32–36)
MCV RBC AUTO: 86.3 FL — SIGNIFICANT CHANGE UP (ref 80–100)
MCV RBC AUTO: 86.4 FL — SIGNIFICANT CHANGE UP (ref 80–100)
MCV RBC AUTO: 86.8 FL — SIGNIFICANT CHANGE UP (ref 80–100)
PHOSPHATE SERPL-MCNC: 3 MG/DL — SIGNIFICANT CHANGE UP (ref 2.5–4.5)
PLATELET # BLD AUTO: 200 K/UL — SIGNIFICANT CHANGE UP (ref 150–400)
PLATELET # BLD AUTO: 214 K/UL — SIGNIFICANT CHANGE UP (ref 150–400)
PLATELET # BLD AUTO: 234 K/UL — SIGNIFICANT CHANGE UP (ref 150–400)
POTASSIUM SERPL-MCNC: 3.5 MMOL/L — SIGNIFICANT CHANGE UP (ref 3.5–5.3)
POTASSIUM SERPL-SCNC: 3.5 MMOL/L — SIGNIFICANT CHANGE UP (ref 3.5–5.3)
RBC # BLD: 2.97 M/UL — LOW (ref 4.2–5.8)
RBC # BLD: 3.12 M/UL — LOW (ref 4.2–5.8)
RBC # BLD: 3.19 M/UL — LOW (ref 4.2–5.8)
RBC # FLD: 12.5 % — SIGNIFICANT CHANGE UP (ref 10.3–14.5)
RBC # FLD: 12.6 % — SIGNIFICANT CHANGE UP (ref 10.3–14.5)
RBC # FLD: 12.8 % — SIGNIFICANT CHANGE UP (ref 10.3–14.5)
SODIUM SERPL-SCNC: 142 MMOL/L — SIGNIFICANT CHANGE UP (ref 135–145)
WBC # BLD: 7.1 K/UL — SIGNIFICANT CHANGE UP (ref 3.8–10.5)
WBC # BLD: 7.2 K/UL — SIGNIFICANT CHANGE UP (ref 3.8–10.5)
WBC # BLD: 8.2 K/UL — SIGNIFICANT CHANGE UP (ref 3.8–10.5)
WBC # FLD AUTO: 7.1 K/UL — SIGNIFICANT CHANGE UP (ref 3.8–10.5)
WBC # FLD AUTO: 7.2 K/UL — SIGNIFICANT CHANGE UP (ref 3.8–10.5)
WBC # FLD AUTO: 8.2 K/UL — SIGNIFICANT CHANGE UP (ref 3.8–10.5)

## 2017-09-15 PROCEDURE — 45378 DIAGNOSTIC COLONOSCOPY: CPT

## 2017-09-15 RX ADMIN — Medication 100 MILLIGRAM(S): at 21:04

## 2017-09-15 RX ADMIN — HYDROMORPHONE HYDROCHLORIDE 0.5 MILLIGRAM(S): 2 INJECTION INTRAMUSCULAR; INTRAVENOUS; SUBCUTANEOUS at 08:46

## 2017-09-15 RX ADMIN — Medication 10 MILLIGRAM(S): at 00:25

## 2017-09-15 RX ADMIN — Medication 1 DROP(S): at 18:00

## 2017-09-15 RX ADMIN — HYDROMORPHONE HYDROCHLORIDE 0.5 MILLIGRAM(S): 2 INJECTION INTRAMUSCULAR; INTRAVENOUS; SUBCUTANEOUS at 09:06

## 2017-09-15 RX ADMIN — Medication 100 MILLIGRAM(S): at 05:07

## 2017-09-15 RX ADMIN — PANTOPRAZOLE SODIUM 40 MILLIGRAM(S): 20 TABLET, DELAYED RELEASE ORAL at 05:07

## 2017-09-15 RX ADMIN — Medication 200 MILLIGRAM(S): at 18:00

## 2017-09-15 RX ADMIN — SODIUM CHLORIDE 50 MILLILITER(S): 9 INJECTION, SOLUTION INTRAVENOUS at 12:38

## 2017-09-15 RX ADMIN — Medication 1: at 11:59

## 2017-09-15 RX ADMIN — Medication 100 MILLIGRAM(S): at 14:00

## 2017-09-15 RX ADMIN — PANTOPRAZOLE SODIUM 40 MILLIGRAM(S): 20 TABLET, DELAYED RELEASE ORAL at 18:00

## 2017-09-15 RX ADMIN — ATORVASTATIN CALCIUM 40 MILLIGRAM(S): 80 TABLET, FILM COATED ORAL at 21:05

## 2017-09-15 RX ADMIN — Medication 200 MILLIGRAM(S): at 05:06

## 2017-09-15 RX ADMIN — Medication 1 DROP(S): at 05:06

## 2017-09-15 NOTE — PROGRESS NOTE ADULT - PROBLEM SELECTOR PLAN 3
no chemical AC due to recent bleed.   on Protonix BID IV
no chemical AC due to recent bleed.   on protonix BID IV>
no chemical AC due to recent bleed.   on Protonix BID IV

## 2017-09-15 NOTE — PROGRESS NOTE ADULT - PROBLEM SELECTOR PLAN 1
-resolved. s/p code fusion. s/p total 8PRBC 4FFP  Lower GI bleed- diverticulosis bleed with CT scan questionable diverticulitis without relevant physical exam finding(LLQ tenderness). Also with hemorrhagic shock and symptomatic anemia from blood loss.  -monitor CBC q8hrs for now. NPO  -GI and surgery consulted.  -No aspirin or anticoagulants or heparin/  Lovenox at this time.  -CTA -negative for source of bleed. CT scan negative for bleed but concern for cecal diverticulitis.   -NGT with no evident of upper GI bleed. Bleeding scan negative.   -colonoscopy with Dr Villavicencio today. -resolved. s/p code fusion. s/p total 8PRBC 4FFP  Lower GI bleed- diverticulosis bleed with CT scan questionable diverticulitis without relevant physical exam finding(LLQ tenderness). Also with hemorrhagic shock and symptomatic anemia from blood loss.  -monitor CBC q8hrs for now. NPO  -GI and surgery consulted.  -No aspirin or anticoagulants or heparin/  Lovenox at this time.  -CTA -negative for source of bleed. CT scan negative for bleed but concern for cecal diverticulitis.   -NGT with no evident of upper GI bleed. Bleeding scan negative.   -Metro and cipro while ruling out diverticulitis.   -colonoscopy with Dr Villavicencio today.

## 2017-09-15 NOTE — PROGRESS NOTE ADULT - SUBJECTIVE AND OBJECTIVE BOX
INTERVAL HPI/OVERNIGHT EVENTS:  Pt resting comfortably. No acute complaints. As per nurse, no bloody BM's for 24 hrs.  NPO  Normal bleeding scan results noted    MEDICATIONS  (STANDING):  atorvastatin 40 milliGRAM(s) Oral at bedtime  pantoprazole  Injectable 40 milliGRAM(s) IV Push two times a day  insulin lispro (HumaLOG) corrective regimen sliding scale   SubCutaneous every 6 hours  ciprofloxacin   IVPB      ciprofloxacin   IVPB 400 milliGRAM(s) IV Intermittent every 12 hours  dextrose 5% + sodium chloride 0.9%. 1000 milliLiter(s) (50 mL/Hr) IV Continuous <Continuous>  metroNIDAZOLE  IVPB      metroNIDAZOLE  IVPB 500 milliGRAM(s) IV Intermittent every 8 hours  artificial  tears Solution 1 Drop(s) Both EYES two times a day    MEDICATIONS  (PRN):  HYDROmorphone  Injectable 0.5 milliGRAM(s) IV Push every 4 hours PRN Moderate Pain (4 - 6)      Vital Signs Last 24 Hrs  T(C): 36.6 (15 Sep 2017 08:00), Max: 36.9 (14 Sep 2017 21:03)  T(F): 97.9 (15 Sep 2017 08:00), Max: 98.5 (14 Sep 2017 21:03)  HR: 66 (15 Sep 2017 09:00) (57 - 73)  BP: 115/67 (15 Sep 2017 09:00) (110/64 - 149/67)  BP(mean): 78 (15 Sep 2017 09:00) (74 - 91)  RR: 12 (15 Sep 2017 09:00) (10 - 20)  SpO2: 100% (15 Sep 2017 09:00) (100% - 100%)    Physical:  General: A&Ox3. NAD.  Abdomen: Soft nondistended, mild LLQ tenderness without guarding or rebound.    I&O's Detail    14 Sep 2017 07:01  -  15 Sep 2017 07:00  --------------------------------------------------------  IN:    dextrose 5% + sodium chloride 0.9%.: 1100 mL    Oral Fluid: 1380 mL    Solution: 300 mL    Solution: 400 mL  Total IN: 3180 mL    OUT:    Indwelling Catheter - Urethral: 175 mL    Voided: 1250 mL  Total OUT: 1425 mL    Total NET: 1755 mL      15 Sep 2017 07:01  -  15 Sep 2017 09:43  --------------------------------------------------------  IN:    dextrose 5% + sodium chloride 0.9%.: 100 mL  Total IN: 100 mL    OUT:  Total OUT: 0 mL    Total NET: 100 mL    LABS:                        9.2    7.1   )-----------( 200      ( 15 Sep 2017 06:30 )             25.8             09-15    142  |  107  |  11  ----------------------------<  143<H>  3.5   |  28  |  0.92    Ca    8.0<L>      15 Sep 2017 06:30  Phos  3.0     09-15  Mg     1.8     09-15

## 2017-09-15 NOTE — PROGRESS NOTE ADULT - SUBJECTIVE AND OBJECTIVE BOX
Patient is a 71y old  Male who presents with a chief complaint of Passing out, fecal incontinence, blood in stool (09 Sep 2017 17:45). Patient was seen and examined, agrees to colonoscopy today. No episodes of BRBPR overnight, but today reports lower abdominal pain. Denies fever, chills, SOB, palpitations, chest pain, nausea, vomiting, diarrhea or constipation.    INTERVAL HPI/OVERNIGHT EVENTS:  T(C): 36.6 (09-15-17 @ 08:00), Max: 36.9 (09-14-17 @ 21:03)  HR: 66 (09-15-17 @ 09:00) (57 - 73)  BP: 115/67 (09-15-17 @ 09:00) (110/64 - 149/67)  RR: 12 (09-15-17 @ 09:00) (10 - 20)  SpO2: 100% (09-15-17 @ 09:00) (100% - 100%)  Wt(kg): --  I&O's Summary    14 Sep 2017 07:01  -  15 Sep 2017 07:00  --------------------------------------------------------  IN: 3180 mL / OUT: 1425 mL / NET: 1755 mL    15 Sep 2017 07:01  -  15 Sep 2017 09:26  --------------------------------------------------------  IN: 100 mL / OUT: 0 mL / NET: 100 mL    MEDICATIONS  (STANDING):  atorvastatin 40 milliGRAM(s) Oral at bedtime  pantoprazole  Injectable 40 milliGRAM(s) IV Push two times a day  insulin lispro (HumaLOG) corrective regimen sliding scale   SubCutaneous every 6 hours  ciprofloxacin   IVPB      ciprofloxacin   IVPB 400 milliGRAM(s) IV Intermittent every 12 hours  dextrose 5% + sodium chloride 0.9%. 1000 milliLiter(s) (50 mL/Hr) IV Continuous <Continuous>  metroNIDAZOLE  IVPB      metroNIDAZOLE  IVPB 500 milliGRAM(s) IV Intermittent every 8 hours  artificial  tears Solution 1 Drop(s) Both EYES two times a day    MEDICATIONS  (PRN):  HYDROmorphone  Injectable 0.5 milliGRAM(s) IV Push every 4 hours PRN Moderate Pain (4 - 6)      PHYSICAL EXAM:  GENERAL: NAD, well-groomed, well-developed  HEAD:  Atraumatic, Normocephalic  EYES: EOMI, PERRLA, conjunctiva and sclera clear  ENMT: No tonsillar erythema, exudates, or enlargement; Moist mucous membranes, Good dentition, No lesions  NECK: Supple, No JVD, Normal thyroid  NERVOUS SYSTEM:  Alert & Oriented X3, Good concentration; Motor Strength 5/5 B/L upper and lower extremities; DTRs 2+ intact and symmetric  CHEST/LUNG: Clear to percussion bilaterally; No rales, rhonchi, wheezing, or rubs  HEART: Regular rate and rhythm; No murmurs, rubs, or gallops  ABDOMEN: Soft, RLQ and LLQ tenderness, Nondistended; Bowel sounds present  EXTREMITIES:  2+ Peripheral Pulses, No clubbing, cyanosis, or edema    LABS:                        9.2    7.1   )-----------( 200      ( 15 Sep 2017 06:30 )             25.8     09-15    142  |  107  |  11  ----------------------------<  143<H>  3.5   |  28  |  0.92    Ca    8.0<L>      15 Sep 2017 06:30  Phos  3.0     09-15  Mg     1.8     09-15    PT/INR - ( 14 Sep 2017 14:55 )   PT: 15.0 sec;   INR: 1.37 ratio       PTT - ( 14 Sep 2017 14:55 )  PTT:25.3 sec    CAPILLARY BLOOD GLUCOSE  148 (15 Sep 2017 05:00)  138 (14 Sep 2017 23:00)  137 (14 Sep 2017 17:00)  137 (14 Sep 2017 14:43)

## 2017-09-15 NOTE — PROGRESS NOTE ADULT - PROBLEM SELECTOR PLAN 2
HSS and accu chek.   A1C 7.0
HSS and accu chek.   f/u A1C
HSS and accu chek.   A1C 7.0

## 2017-09-15 NOTE — PROGRESS NOTE ADULT - PROBLEM SELECTOR PLAN 1
Diet as tolerated  Needs colonoscopy when stable  No surgical intervention indicated at present time.  Reconsult if abnormalities found on colonoscopy.

## 2017-09-15 NOTE — PROGRESS NOTE ADULT - SUBJECTIVE AND OBJECTIVE BOX
INTERVAL HPI/ OVERNIGHT EVENTS: No active bleed overnight.         Antimicrobial:  ciprofloxacin   IVPB      ciprofloxacin   IVPB 400 milliGRAM(s) IV Intermittent every 12 hours  metroNIDAZOLE  IVPB      metroNIDAZOLE  IVPB 500 milliGRAM(s) IV Intermittent every 8 hours      Other:  atorvastatin 40 milliGRAM(s) Oral at bedtime  pantoprazole  Injectable 40 milliGRAM(s) IV Push two times a day  insulin lispro (HumaLOG) corrective regimen sliding scale   SubCutaneous every 6 hours  dextrose 5% + sodium chloride 0.9%. 1000 milliLiter(s) IV Continuous <Continuous>  artificial  tears Solution 1 Drop(s) Both EYES two times a day  HYDROmorphone  Injectable 0.5 milliGRAM(s) IV Push every 4 hours PRN    atorvastatin 40 milliGRAM(s) Oral at bedtime  pantoprazole  Injectable 40 milliGRAM(s) IV Push two times a day  insulin lispro (HumaLOG) corrective regimen sliding scale   SubCutaneous every 6 hours  ciprofloxacin   IVPB      ciprofloxacin   IVPB 400 milliGRAM(s) IV Intermittent every 12 hours  dextrose 5% + sodium chloride 0.9%. 1000 milliLiter(s) IV Continuous <Continuous>  metroNIDAZOLE  IVPB      metroNIDAZOLE  IVPB 500 milliGRAM(s) IV Intermittent every 8 hours  artificial  tears Solution 1 Drop(s) Both EYES two times a day  HYDROmorphone  Injectable 0.5 milliGRAM(s) IV Push every 4 hours PRN    Drug Dosing Weight    Weight (kg): 60 (11 Sep 2017 13:19)    CENTRAL LINE: [ ] YES [x ] NO  LOCATION:   DATE INSERTED:  REMOVE: [ ] YES [ ] NO  EXPLAIN:    [place]: [ ] YES [x ] NO    DATE INSERTED:  REMOVE:  [ ] YES [ ] NO  EXPLAIN:    A-LINE:  [ ] YES [x ] NO  LOCATION:   DATE INSERTED:  REMOVE:  [ ] YES [ ] NO  EXPLAIN:    PMH -reviewed admission note, no change since admission      ICU Vital Signs Last 24 Hrs  T(C): 36.3 (15 Sep 2017 05:00), Max: 36.9 (14 Sep 2017 21:03)  T(F): 97.4 (15 Sep 2017 05:00), Max: 98.5 (14 Sep 2017 21:03)  HR: 71 (15 Sep 2017 07:00) (57 - 73)  BP: 133/65 (15 Sep 2017 07:00) (102/60 - 149/67)  BP(mean): 82 (15 Sep 2017 07:00) (71 - 91)  ABP: --  ABP(mean): --  RR: 14 (15 Sep 2017 07:00) (10 - 20)  SpO2: 100% (15 Sep 2017 07:00) (100% - 100%)            09-14 @ 07:01  -  09-15 @ 07:00  --------------------------------------------------------  IN: 3180 mL / OUT: 1425 mL / NET: 1755 mL            PHYSICAL EXAM:    CONSTITUTIONAL: No fever, weight loss, or fatigue  	EYES: No eye pain, visual disturbances, or discharge  	ENMT:  No difficulty hearing, tinnitus, vertigo; No sinus or throat pain  	NECK: No pain or stiffness  	RESPIRATORY: No cough, wheezing, chills or hemoptysis; No shortness of breath  	CARDIOVASCULAR: syncope, dizziness - no chest pain, no palpitations  	GASTROINTESTINAL: mild tenderness in suprapubic area   	GENITOURINARY: No dysuria, frequency, hematuria, or incontinence  	NEUROLOGICAL: No headaches, memory loss, loss of strength, numbness, or tremors  	SKIN: No itching, burning, rashes, or lesions   	LYMPH NODES: No enlarged glands  	ENDOCRINE: No heat or cold intolerance; No hair loss  	MUSCULOSKELETAL: No joint pain or swelling; No muscle, back, or extremity pain  	PSYCHIATRIC: No depression, anxiety, mood swings, or difficulty sleeping  	HEME/LYMPH: No easy bruising, or bleeding gums  ALLERY AND IMMUNOLOGIC: No hives or eczema      LABS:  CBC Full  -  ( 15 Sep 2017 06:30 )  WBC Count : 7.1 K/uL  Hemoglobin : 9.2 g/dL  Hematocrit : 25.8 %  Platelet Count - Automated : 200 K/uL  Mean Cell Volume : 86.8 fl  Mean Cell Hemoglobin : 30.9 pg  Mean Cell Hemoglobin Concentration : 35.6 gm/dL      09-15    142  |  107  |  11  ----------------------------<  143<H>  3.5   |  28  |  0.92    Ca    8.0<L>      15 Sep 2017 06:30  Phos  3.0     09-15  Mg     1.8     09-15      PT/INR - ( 14 Sep 2017 14:55 )   PT: 15.0 sec;   INR: 1.37 ratio         PTT - ( 14 Sep 2017 14:55 )  PTT:25.3 sec        [x ]GOALS OF CARE DISCUSSION WITH PATIENT/FAMILY/PROXY:    CRITICAL CARE TIME SPENT: 35 minutes

## 2017-09-15 NOTE — PROGRESS NOTE ADULT - PROBLEM SELECTOR PLAN 1
s/p 8 PRBC and 4 FFP since admission  CT angio ab/pelvis: no active bleed on 9/11, but ?cecal diverticulitis recommending colonoscopy to r/o neoplasm after treatment  bleeding scan negative for active bleeding sites  colonoscopy today  today reports lower abdominal pain, new onset from yesterday evening, no other complaints  no BRBPR episodes overnight

## 2017-09-16 LAB
ALBUMIN SERPL ELPH-MCNC: 2.2 G/DL — LOW (ref 3.5–5)
ALP SERPL-CCNC: 53 U/L — SIGNIFICANT CHANGE UP (ref 40–120)
ALT FLD-CCNC: 26 U/L DA — SIGNIFICANT CHANGE UP (ref 10–60)
ANION GAP SERPL CALC-SCNC: 7 MMOL/L — SIGNIFICANT CHANGE UP (ref 5–17)
AST SERPL-CCNC: 20 U/L — SIGNIFICANT CHANGE UP (ref 10–40)
BILIRUB SERPL-MCNC: 0.4 MG/DL — SIGNIFICANT CHANGE UP (ref 0.2–1.2)
BUN SERPL-MCNC: 10 MG/DL — SIGNIFICANT CHANGE UP (ref 7–18)
CALCIUM SERPL-MCNC: 7.9 MG/DL — LOW (ref 8.4–10.5)
CHLORIDE SERPL-SCNC: 109 MMOL/L — HIGH (ref 96–108)
CO2 SERPL-SCNC: 27 MMOL/L — SIGNIFICANT CHANGE UP (ref 22–31)
CREAT SERPL-MCNC: 0.86 MG/DL — SIGNIFICANT CHANGE UP (ref 0.5–1.3)
GLUCOSE SERPL-MCNC: 138 MG/DL — HIGH (ref 70–99)
HCT VFR BLD CALC: 24.6 % — LOW (ref 39–50)
HCT VFR BLD CALC: 25.2 % — LOW (ref 39–50)
HCT VFR BLD CALC: 26.4 % — LOW (ref 39–50)
HGB BLD-MCNC: 8.8 G/DL — LOW (ref 13–17)
HGB BLD-MCNC: 8.9 G/DL — LOW (ref 13–17)
HGB BLD-MCNC: 9.1 G/DL — LOW (ref 13–17)
MAGNESIUM SERPL-MCNC: 1.7 MG/DL — SIGNIFICANT CHANGE UP (ref 1.6–2.6)
MCHC RBC-ENTMCNC: 30 PG — SIGNIFICANT CHANGE UP (ref 27–34)
MCHC RBC-ENTMCNC: 31.3 PG — SIGNIFICANT CHANGE UP (ref 27–34)
MCHC RBC-ENTMCNC: 31.4 PG — SIGNIFICANT CHANGE UP (ref 27–34)
MCHC RBC-ENTMCNC: 34.5 GM/DL — SIGNIFICANT CHANGE UP (ref 32–36)
MCHC RBC-ENTMCNC: 35.4 GM/DL — SIGNIFICANT CHANGE UP (ref 32–36)
MCHC RBC-ENTMCNC: 35.8 GM/DL — SIGNIFICANT CHANGE UP (ref 32–36)
MCV RBC AUTO: 86.8 FL — SIGNIFICANT CHANGE UP (ref 80–100)
MCV RBC AUTO: 87.9 FL — SIGNIFICANT CHANGE UP (ref 80–100)
MCV RBC AUTO: 88.6 FL — SIGNIFICANT CHANGE UP (ref 80–100)
PHOSPHATE SERPL-MCNC: 2.8 MG/DL — SIGNIFICANT CHANGE UP (ref 2.5–4.5)
PLATELET # BLD AUTO: 210 K/UL — SIGNIFICANT CHANGE UP (ref 150–400)
PLATELET # BLD AUTO: 224 K/UL — SIGNIFICANT CHANGE UP (ref 150–400)
PLATELET # BLD AUTO: 248 K/UL — SIGNIFICANT CHANGE UP (ref 150–400)
POTASSIUM SERPL-MCNC: 3.6 MMOL/L — SIGNIFICANT CHANGE UP (ref 3.5–5.3)
POTASSIUM SERPL-SCNC: 3.6 MMOL/L — SIGNIFICANT CHANGE UP (ref 3.5–5.3)
PROT SERPL-MCNC: 4.8 G/DL — LOW (ref 6–8.3)
RBC # BLD: 2.8 M/UL — LOW (ref 4.2–5.8)
RBC # BLD: 2.84 M/UL — LOW (ref 4.2–5.8)
RBC # BLD: 3.04 M/UL — LOW (ref 4.2–5.8)
RBC # FLD: 12.6 % — SIGNIFICANT CHANGE UP (ref 10.3–14.5)
RBC # FLD: 12.7 % — SIGNIFICANT CHANGE UP (ref 10.3–14.5)
RBC # FLD: 13 % — SIGNIFICANT CHANGE UP (ref 10.3–14.5)
SODIUM SERPL-SCNC: 143 MMOL/L — SIGNIFICANT CHANGE UP (ref 135–145)
WBC # BLD: 6.2 K/UL — SIGNIFICANT CHANGE UP (ref 3.8–10.5)
WBC # BLD: 6.8 K/UL — SIGNIFICANT CHANGE UP (ref 3.8–10.5)
WBC # BLD: 8.6 K/UL — SIGNIFICANT CHANGE UP (ref 3.8–10.5)
WBC # FLD AUTO: 6.2 K/UL — SIGNIFICANT CHANGE UP (ref 3.8–10.5)
WBC # FLD AUTO: 6.8 K/UL — SIGNIFICANT CHANGE UP (ref 3.8–10.5)
WBC # FLD AUTO: 8.6 K/UL — SIGNIFICANT CHANGE UP (ref 3.8–10.5)

## 2017-09-16 RX ADMIN — SODIUM CHLORIDE 50 MILLILITER(S): 9 INJECTION, SOLUTION INTRAVENOUS at 05:24

## 2017-09-16 RX ADMIN — Medication 100 MILLIGRAM(S): at 13:41

## 2017-09-16 RX ADMIN — Medication 200 MILLIGRAM(S): at 05:24

## 2017-09-16 RX ADMIN — Medication 100 MILLIGRAM(S): at 21:43

## 2017-09-16 RX ADMIN — Medication 1 DROP(S): at 05:25

## 2017-09-16 RX ADMIN — Medication 100 MILLIGRAM(S): at 05:25

## 2017-09-16 RX ADMIN — Medication 1 DROP(S): at 17:01

## 2017-09-16 RX ADMIN — Medication 200 MILLIGRAM(S): at 17:01

## 2017-09-16 RX ADMIN — PANTOPRAZOLE SODIUM 40 MILLIGRAM(S): 20 TABLET, DELAYED RELEASE ORAL at 17:00

## 2017-09-16 RX ADMIN — ATORVASTATIN CALCIUM 40 MILLIGRAM(S): 80 TABLET, FILM COATED ORAL at 21:44

## 2017-09-16 RX ADMIN — Medication 1: at 12:25

## 2017-09-16 RX ADMIN — PANTOPRAZOLE SODIUM 40 MILLIGRAM(S): 20 TABLET, DELAYED RELEASE ORAL at 05:25

## 2017-09-16 NOTE — PROGRESS NOTE ADULT - ASSESSMENT
71M from home, still works PMH of DM, HTN, Kidney stones, HLD who presents to hospital for syncope, possible seizure while on the way to work and episode of bloody stool incontinence.  Pt had 2 RRTs today for 2 episodes of bright red bloody bowel movement and hypotension to 80s, s/p 3 litre boluses and 1 unit pRBC being given. ICU accepted the pt as he is hemodynamically unstable sec to active Lower GI bleed.     1)  Hypovolemic shock.    resolved. s/p code fusion. s/p total 8PRBC 4FFP  Lower GI bleed- diverticulosis bleed with CT scan questionable diverticulitis without relevant physical exam finding(LLQ tenderness). Also with hemorrhagic shock and symptomatic anemia from blood loss.  -monitor CBC q8hrs for now. NPO  -GI and surgery consulted.  -No aspirin or anticoagulants or heparin/  Lovenox at this time.  -CTA -negative for source of bleed. CT scan negative for bleed but concern for cecal diverticulitis.   -NGT with no evident of upper GI bleed. Bleeding scan negative.   -Metro and cipro while ruling out diverticulitis.   -colonoscopy shows no bleeding site in the left colon, likely bleed could be in right colon.  -Currently the Hb is stable    2) Diabetes.  Plan: HSS and accu chek.   A1C 7.0.     3) Need for prophylactic measure.  no chemical AC due to recent bleed.   on Protonix BID IV. 71M from home, still works PMH of DM, HTN, Kidney stones, HLD who presents to hospital for syncope, possible seizure while on the way to work and episode of bloody stool incontinence.  Pt had 2 RRTs today for 2 episodes of bright red bloody bowel movement and hypotension to 80s, s/p 3 litre boluses and 1 unit pRBC being given. ICU accepted the pt as he is hemodynamically unstable sec to active Lower GI bleed.     1)  Hypovolemic shock.    resolved. s/p code fusion. s/p total 8PRBC 4FFP  Lower GI bleed- diverticulosis bleed with CT scan questionable diverticulitis without relevant physical exam finding(LLQ tenderness). Also with hemorrhagic shock and symptomatic anemia from blood loss.  -monitor CBC q12hrs for now. NPO  -GI and surgery consulted.  -No aspirin or anticoagulants or heparin/  Lovenox at this time.  -CTA -negative for source of bleed. CT scan negative for bleed but concern for cecal diverticulitis.   -NGT with no evident of upper GI bleed. Bleeding scan negative.   -Metro and cipro while ruling out diverticulitis.   -colonoscopy shows no bleeding site in the left colon, likely bleed could be in right colon.  -Currently the Hb is stable    2) Diabetes.  Plan: HSS and accu chek.   A1C 7.0.     3) Need for prophylactic measure.  no chemical AC due to recent bleed.   on Protonix BID IV.

## 2017-09-16 NOTE — PROGRESS NOTE ADULT - ASSESSMENT
diverticular bleed  doing ok for more than 24 hours.  will watch  if bleeds again then may be surgery.  watch hgb.  d/w daughter.

## 2017-09-16 NOTE — PROGRESS NOTE ADULT - SUBJECTIVE AND OBJECTIVE BOX
INTERVAL HPI/OVERNIGHT EVENTS: ***    PRESSORS: [ ] YES [x ] NO  WHICH:    Antimicrobial:  ciprofloxacin   IVPB      ciprofloxacin   IVPB 400 milliGRAM(s) IV Intermittent every 12 hours  metroNIDAZOLE  IVPB      metroNIDAZOLE  IVPB 500 milliGRAM(s) IV Intermittent every 8 hours    Cardiovascular:    Pulmonary:    Hematalogic:    Other:  atorvastatin 40 milliGRAM(s) Oral at bedtime  pantoprazole  Injectable 40 milliGRAM(s) IV Push two times a day  insulin lispro (HumaLOG) corrective regimen sliding scale   SubCutaneous every 6 hours  dextrose 5% + sodium chloride 0.9%. 1000 milliLiter(s) IV Continuous <Continuous>  artificial  tears Solution 1 Drop(s) Both EYES two times a day  HYDROmorphone  Injectable 0.5 milliGRAM(s) IV Push every 4 hours PRN    atorvastatin 40 milliGRAM(s) Oral at bedtime  pantoprazole  Injectable 40 milliGRAM(s) IV Push two times a day  insulin lispro (HumaLOG) corrective regimen sliding scale   SubCutaneous every 6 hours  ciprofloxacin   IVPB      ciprofloxacin   IVPB 400 milliGRAM(s) IV Intermittent every 12 hours  dextrose 5% + sodium chloride 0.9%. 1000 milliLiter(s) IV Continuous <Continuous>  metroNIDAZOLE  IVPB      metroNIDAZOLE  IVPB 500 milliGRAM(s) IV Intermittent every 8 hours  artificial  tears Solution 1 Drop(s) Both EYES two times a day  HYDROmorphone  Injectable 0.5 milliGRAM(s) IV Push every 4 hours PRN    Drug Dosing Weight    Weight (kg): 60 (11 Sep 2017 13:19)        PMH -reviewed admission note, no change since admission  Heart faliure: acute [ ] chronic [ ] acute or chronic [ ] diastolic [ ] systolic [ ] combied systolic and diastolic[ ]  ERNA: ATN[ ] renal medullary necrosis [ ] CKD I [ ]CKDII [ ]CKD III [ ]CKD IV [ ]CKD V [ ]Other pathological lesions [ ]  Abdominal Nutrition Status: malnutrition [ ] cachexia [ ] morbid obesity/BMI=40 [ ] Supplement ordered [___________]     CENTRAL LINE: [ ] YES [x ] NO  LOCATION:   DATE INSERTED:  REMOVE: [ ] YES [ ] NO  EXPLAIN:    GARLAND: [ ] YES [x ] NO    DATE INSERTED:  REMOVE:  [ ] YES [ ] NO  EXPLAIN:    A-LINE:  [ ] YES [x ] NO  LOCATION:   DATE INSERTED:  REMOVE:  [ ] YES [ ] NO  EXPLAIN:    ICU Vital Signs Last 24 Hrs  T(C): 36.9 (15 Sep 2017 23:59), Max: 37.1 (15 Sep 2017 19:18)  T(F): 98.5 (15 Sep 2017 23:59), Max: 98.8 (15 Sep 2017 19:18)  HR: 64 (16 Sep 2017 00:00) (57 - 72)  BP: 120/67 (16 Sep 2017 00:00) (103/62 - 138/70)  BP(mean): 80 (16 Sep 2017 00:00) (72 - 89)  ABP: --  ABP(mean): --  RR: 11 (16 Sep 2017 00:00) (10 - 19)  SpO2: 100% (16 Sep 2017 00:00) (100% - 100%)            09-14 @ 07:01  -  09-15 @ 07:00  --------------------------------------------------------  IN: 3180 mL / OUT: 1425 mL / NET: 1755 mL            PHYSICAL EXAM:    CONSTITUTIONAL: No fever, weight loss, or fatigue  	EYES: No eye pain, visual disturbances, or discharge  	ENMT:  No difficulty hearing, tinnitus, vertigo; No sinus or throat pain  	NECK: No pain or stiffness  	RESPIRATORY: No cough, wheezing, chills or hemoptysis; No shortness of breath  	CARDIOVASCULAR: syncope, dizziness - no chest pain, no palpitations  	GASTROINTESTINAL: mild tenderness in suprapubic area   	GENITOURINARY: No dysuria, frequency, hematuria, or incontinence  	NEUROLOGICAL: No headaches, memory loss, loss of strength, numbness, or tremors  	SKIN: No itching, burning, rashes, or lesions   	LYMPH NODES: No enlarged glands  	ENDOCRINE: No heat or cold intolerance; No hair loss  	MUSCULOSKELETAL: No joint pain or swelling; No muscle, back, or extremity pain  	PSYCHIATRIC: No depression, anxiety, mood swings, or difficulty sleeping  	HEME/LYMPH: No easy bruising, or bleeding gums  ALLERY AND IMMUNOLOGIC: No hives or eczema  LABS:  CBC Full  -  ( 15 Sep 2017 19:16 )  WBC Count : 7.2 K/uL  Hemoglobin : 9.4 g/dL  Hematocrit : 26.9 %  Platelet Count - Automated : 214 K/uL  Mean Cell Volume : 86.3 fl  Mean Cell Hemoglobin : 30.2 pg  Mean Cell Hemoglobin Concentration : 35.0 gm/dL  Auto Neutrophil # : x  Auto Lymphocyte # : x  Auto Monocyte # : x  Auto Eosinophil # : x  Auto Basophil # : x  Auto Neutrophil % : x  Auto Lymphocyte % : x  Auto Monocyte % : x  Auto Eosinophil % : x  Auto Basophil % : x    09-15    142  |  107  |  11  ----------------------------<  143<H>  3.5   |  28  |  0.92    Ca    8.0<L>      15 Sep 2017 06:30  Phos  3.0     09-15  Mg     1.8     09-15      PT/INR - ( 14 Sep 2017 14:55 )   PT: 15.0 sec;   INR: 1.37 ratio         PTT - ( 14 Sep 2017 14:55 )  PTT:25.3 sec        RADIOLOGY & ADDITIONAL STUDIES REVIEWED:  ***    [ ]GOALS OF CARE DISCUSSION WITH PATIENT/FAMILY/PROXY:    CRITICAL CARE TIME SPENT: 35 minutes INTERVAL HPI/OVERNIGHT EVENTS: no further bleeding overnight    PRESSORS: [ ] YES [x ] NO  WHICH:    Antimicrobial:  ciprofloxacin   IVPB      ciprofloxacin   IVPB 400 milliGRAM(s) IV Intermittent every 12 hours  metroNIDAZOLE  IVPB      metroNIDAZOLE  IVPB 500 milliGRAM(s) IV Intermittent every 8 hours    Cardiovascular:    Pulmonary:    Hematalogic:    Other:  atorvastatin 40 milliGRAM(s) Oral at bedtime  pantoprazole  Injectable 40 milliGRAM(s) IV Push two times a day  insulin lispro (HumaLOG) corrective regimen sliding scale   SubCutaneous every 6 hours  dextrose 5% + sodium chloride 0.9%. 1000 milliLiter(s) IV Continuous <Continuous>  artificial  tears Solution 1 Drop(s) Both EYES two times a day  HYDROmorphone  Injectable 0.5 milliGRAM(s) IV Push every 4 hours PRN    atorvastatin 40 milliGRAM(s) Oral at bedtime  pantoprazole  Injectable 40 milliGRAM(s) IV Push two times a day  insulin lispro (HumaLOG) corrective regimen sliding scale   SubCutaneous every 6 hours  ciprofloxacin   IVPB      ciprofloxacin   IVPB 400 milliGRAM(s) IV Intermittent every 12 hours  dextrose 5% + sodium chloride 0.9%. 1000 milliLiter(s) IV Continuous <Continuous>  metroNIDAZOLE  IVPB      metroNIDAZOLE  IVPB 500 milliGRAM(s) IV Intermittent every 8 hours  artificial  tears Solution 1 Drop(s) Both EYES two times a day  HYDROmorphone  Injectable 0.5 milliGRAM(s) IV Push every 4 hours PRN    Drug Dosing Weight    Weight (kg): 60 (11 Sep 2017 13:19)        PMH -reviewed admission note, no change since admission  Heart faliure: acute [ ] chronic [ ] acute or chronic [ ] diastolic [ ] systolic [ ] combied systolic and diastolic[ ]  ERNA: ATN[ ] renal medullary necrosis [ ] CKD I [ ]CKDII [ ]CKD III [ ]CKD IV [ ]CKD V [ ]Other pathological lesions [ ]  Abdominal Nutrition Status: malnutrition [ ] cachexia [ ] morbid obesity/BMI=40 [ ] Supplement ordered [___________]     CENTRAL LINE: [ ] YES [x ] NO  LOCATION:   DATE INSERTED:  REMOVE: [ ] YES [ ] NO  EXPLAIN:    GARLAND: [ ] YES [x ] NO    DATE INSERTED:  REMOVE:  [ ] YES [ ] NO  EXPLAIN:    A-LINE:  [ ] YES [x ] NO  LOCATION:   DATE INSERTED:  REMOVE:  [ ] YES [ ] NO  EXPLAIN:    ICU Vital Signs Last 24 Hrs  T(C): 36.9 (15 Sep 2017 23:59), Max: 37.1 (15 Sep 2017 19:18)  T(F): 98.5 (15 Sep 2017 23:59), Max: 98.8 (15 Sep 2017 19:18)  HR: 64 (16 Sep 2017 00:00) (57 - 72)  BP: 120/67 (16 Sep 2017 00:00) (103/62 - 138/70)  BP(mean): 80 (16 Sep 2017 00:00) (72 - 89)  ABP: --  ABP(mean): --  RR: 11 (16 Sep 2017 00:00) (10 - 19)  SpO2: 100% (16 Sep 2017 00:00) (100% - 100%)            09-14 @ 07:01  -  09-15 @ 07:00  --------------------------------------------------------  IN: 3180 mL / OUT: 1425 mL / NET: 1755 mL            PHYSICAL EXAM:    CONSTITUTIONAL: No fever, weight loss, or fatigue  	EYES: No eye pain, visual disturbances, or discharge  	ENMT:  No difficulty hearing, tinnitus, vertigo; No sinus or throat pain  	NECK: No pain or stiffness  	RESPIRATORY: No cough, wheezing, chills or hemoptysis; No shortness of breath  	CARDIOVASCULAR: syncope, dizziness - no chest pain, no palpitations  	GASTROINTESTINAL: mild tenderness in suprapubic area   	GENITOURINARY: No dysuria, frequency, hematuria, or incontinence  	NEUROLOGICAL: No headaches, memory loss, loss of strength, numbness, or tremors  	SKIN: No itching, burning, rashes, or lesions   	LYMPH NODES: No enlarged glands  	ENDOCRINE: No heat or cold intolerance; No hair loss  	MUSCULOSKELETAL: No joint pain or swelling; No muscle, back, or extremity pain  	PSYCHIATRIC: No depression, anxiety, mood swings, or difficulty sleeping  	HEME/LYMPH: No easy bruising, or bleeding gums  ALLERY AND IMMUNOLOGIC: No hives or eczema  LABS:  CBC Full  -  ( 15 Sep 2017 19:16 )  WBC Count : 7.2 K/uL  Hemoglobin : 9.4 g/dL  Hematocrit : 26.9 %  Platelet Count - Automated : 214 K/uL  Mean Cell Volume : 86.3 fl  Mean Cell Hemoglobin : 30.2 pg  Mean Cell Hemoglobin Concentration : 35.0 gm/dL  Auto Neutrophil # : x  Auto Lymphocyte # : x  Auto Monocyte # : x  Auto Eosinophil # : x  Auto Basophil # : x  Auto Neutrophil % : x  Auto Lymphocyte % : x  Auto Monocyte % : x  Auto Eosinophil % : x  Auto Basophil % : x    09-15    142  |  107  |  11  ----------------------------<  143<H>  3.5   |  28  |  0.92    Ca    8.0<L>      15 Sep 2017 06:30  Phos  3.0     09-15  Mg     1.8     09-15      PT/INR - ( 14 Sep 2017 14:55 )   PT: 15.0 sec;   INR: 1.37 ratio         PTT - ( 14 Sep 2017 14:55 )  PTT:25.3 sec        RADIOLOGY & ADDITIONAL STUDIES REVIEWED:  ***    [ ]GOALS OF CARE DISCUSSION WITH PATIENT/FAMILY/PROXY:    CRITICAL CARE TIME SPENT: 35 minutes INTERVAL HPI/OVERNIGHT EVENTS:   No acute events reported overnight.    Today pt presents in no acute distress.  Pt found resting comfortably in bed.      PRESSORS: [ ] YES [x ] NO  WHICH:    Antimicrobial:     ciprofloxacin   IVPB 400 milliGRAM(s) IV Intermittent every 12 hours  metroNIDAZOLE  IVPB 500 milliGRAM(s) IV Intermittent every 8 hours      MEDICATIONS  (STANDING):  atorvastatin 40 milliGRAM(s) Oral at bedtime  pantoprazole  Injectable 40 milliGRAM(s) IV Push two times a day  insulin lispro (HumaLOG) corrective regimen sliding scale   SubCutaneous every 6 hours  ciprofloxacin   IVPB      ciprofloxacin   IVPB 400 milliGRAM(s) IV Intermittent every 12 hours  metroNIDAZOLE  IVPB      metroNIDAZOLE  IVPB 500 milliGRAM(s) IV Intermittent every 8 hours  artificial  tears Solution 1 Drop(s) Both EYES two times a day    MEDICATIONS  (PRN):  HYDROmorphone  Injectable 0.5 milliGRAM(s) IV Push every 4 hours PRN Moderate Pain (4 - 6)        PMH -reviewed admission note, no change since admission    CENTRAL LINE: [ ] YES [x ] NO  LOCATION:   DATE INSERTED:  REMOVE: [ ] YES [ ] NO  EXPLAIN:    G[place]LAND: [ ] YES [x ] NO    DATE INSERTED:  REMOVE:  [ ] YES [ ] NO  EXPLAIN:    A-LINE:  [ ] YES [x ] NO  LOCATION:   DATE INSERTED:  REMOVE:  [ ] YES [ ] NO  EXPLAIN:    ICU Vital Signs Last 24 Hrs  T(C): 36.9 (16 Sep 2017 10:00), Max: 37.1 (15 Sep 2017 19:18)  T(F): 98.5 (16 Sep 2017 10:00), Max: 98.8 (15 Sep 2017 19:18)  HR: 68 (16 Sep 2017 17:00) (61 - 82)  BP: 129/69 (16 Sep 2017 17:00) (106/65 - 138/70)  BP(mean): 84 (16 Sep 2017 17:00) (70 - 97)  ABP: --  ABP(mean): --  RR: 18 (16 Sep 2017 17:00) (10 - 19)  SpO2: 99% (16 Sep 2017 17:00) (96% - 100%)        PHYSICAL EXAM:    CONSTITUTIONAL: No fever, weight loss, or fatigue  	EYES: No eye pain, visual disturbances, or discharge  	ENMT:  No difficulty hearing, tinnitus, vertigo; No sinus or throat pain  	NECK: No pain or stiffness  	RESPIRATORY: No cough, wheezing, chills or hemoptysis; No shortness of breath  	CARDIOVASCULAR: syncope, dizziness - no chest pain, no palpitations  	GASTROINTESTINAL: mild tenderness in suprapubic area   	GENITOURINARY: No dysuria, frequency, hematuria, or incontinence  	NEUROLOGICAL: No headaches, memory loss, loss of strength, numbness, or tremors  	SKIN: No itching, burning, rashes, or lesions   	LYMPH NODES: No enlarged glands  	ENDOCRINE: No heat or cold intolerance; No hair loss  	MUSCULOSKELETAL: No joint pain or swelling; No muscle, back, or extremity pain  	PSYCHIATRIC: No depression, anxiety, mood swings, or difficulty sleeping  	HEME/LYMPH: No easy bruising, or bleeding gums    ALLERY AND IMMUNOLOGIC: No hives or eczema      LABS:  CBC Full  -  ( 16 Sep 2017 10:46 )  WBC Count : 8.6 K/uL  Hemoglobin : 9.1 g/dL  Hematocrit : 26.4 %  Platelet Count - Automated : 248 K/uL  Mean Cell Volume : 86.8 fl  Mean Cell Hemoglobin : 30.0 pg  Mean Cell Hemoglobin Concentration : 34.5 gm/dL  Auto Neutrophil # : x  Auto Lymphocyte # : x  Auto Monocyte # : x  Auto Eosinophil # : x  Auto Basophil # : x  Auto Neutrophil % : x  Auto Lymphocyte % : x  Auto Monocyte % : x  Auto Eosinophil % : x  Auto Basophil % : x    09-16    143  |  109<H>  |  10  ----------------------------<  138<H>  3.6   |  27  |  0.86    Ca    7.9<L>      16 Sep 2017 03:58  Phos  2.8     09-16  Mg     1.7     09-16    TPro  4.8<L>  /  Alb  2.2<L>  /  TBili  0.4  /  DBili  x   /  AST  20  /  ALT  26  /  AlkPhos  53  09-16      RADIOLOGY & ADDITIONAL STUDIES REVIEWED:      [ ]GOALS OF CARE DISCUSSION WITH PATIENT/FAMILY/PROXY:    CRITICAL CARE TIME SPENT: 35 minutes

## 2017-09-17 LAB
ALBUMIN SERPL ELPH-MCNC: 2.5 G/DL — LOW (ref 3.5–5)
ALP SERPL-CCNC: 57 U/L — SIGNIFICANT CHANGE UP (ref 40–120)
ALT FLD-CCNC: 52 U/L DA — SIGNIFICANT CHANGE UP (ref 10–60)
ANION GAP SERPL CALC-SCNC: 6 MMOL/L — SIGNIFICANT CHANGE UP (ref 5–17)
ANION GAP SERPL CALC-SCNC: 7 MMOL/L — SIGNIFICANT CHANGE UP (ref 5–17)
AST SERPL-CCNC: 63 U/L — HIGH (ref 10–40)
BILIRUB SERPL-MCNC: 0.5 MG/DL — SIGNIFICANT CHANGE UP (ref 0.2–1.2)
BUN SERPL-MCNC: 10 MG/DL — SIGNIFICANT CHANGE UP (ref 7–18)
BUN SERPL-MCNC: 12 MG/DL — SIGNIFICANT CHANGE UP (ref 7–18)
CALCIUM SERPL-MCNC: 8.1 MG/DL — LOW (ref 8.4–10.5)
CALCIUM SERPL-MCNC: 8.2 MG/DL — LOW (ref 8.4–10.5)
CHLORIDE SERPL-SCNC: 107 MMOL/L — SIGNIFICANT CHANGE UP (ref 96–108)
CHLORIDE SERPL-SCNC: 109 MMOL/L — HIGH (ref 96–108)
CO2 SERPL-SCNC: 27 MMOL/L — SIGNIFICANT CHANGE UP (ref 22–31)
CO2 SERPL-SCNC: 27 MMOL/L — SIGNIFICANT CHANGE UP (ref 22–31)
CREAT SERPL-MCNC: 0.94 MG/DL — SIGNIFICANT CHANGE UP (ref 0.5–1.3)
CREAT SERPL-MCNC: 1.02 MG/DL — SIGNIFICANT CHANGE UP (ref 0.5–1.3)
GLUCOSE SERPL-MCNC: 122 MG/DL — HIGH (ref 70–99)
GLUCOSE SERPL-MCNC: 190 MG/DL — HIGH (ref 70–99)
HCT VFR BLD CALC: 26.1 % — LOW (ref 39–50)
HCT VFR BLD CALC: 26.8 % — LOW (ref 39–50)
HCT VFR BLD CALC: 27.6 % — LOW (ref 39–50)
HGB BLD-MCNC: 9 G/DL — LOW (ref 13–17)
HGB BLD-MCNC: 9.6 G/DL — LOW (ref 13–17)
HGB BLD-MCNC: 9.7 G/DL — LOW (ref 13–17)
INR BLD: 1.51 RATIO — HIGH (ref 0.88–1.16)
MAGNESIUM SERPL-MCNC: 1.9 MG/DL — SIGNIFICANT CHANGE UP (ref 1.6–2.6)
MCHC RBC-ENTMCNC: 30 PG — SIGNIFICANT CHANGE UP (ref 27–34)
MCHC RBC-ENTMCNC: 30.4 PG — SIGNIFICANT CHANGE UP (ref 27–34)
MCHC RBC-ENTMCNC: 30.7 PG — SIGNIFICANT CHANGE UP (ref 27–34)
MCHC RBC-ENTMCNC: 34.4 GM/DL — SIGNIFICANT CHANGE UP (ref 32–36)
MCHC RBC-ENTMCNC: 35.1 GM/DL — SIGNIFICANT CHANGE UP (ref 32–36)
MCHC RBC-ENTMCNC: 35.9 GM/DL — SIGNIFICANT CHANGE UP (ref 32–36)
MCV RBC AUTO: 85.6 FL — SIGNIFICANT CHANGE UP (ref 80–100)
MCV RBC AUTO: 86.7 FL — SIGNIFICANT CHANGE UP (ref 80–100)
MCV RBC AUTO: 87.2 FL — SIGNIFICANT CHANGE UP (ref 80–100)
PHOSPHATE SERPL-MCNC: 2.7 MG/DL — SIGNIFICANT CHANGE UP (ref 2.5–4.5)
PLATELET # BLD AUTO: 269 K/UL — SIGNIFICANT CHANGE UP (ref 150–400)
PLATELET # BLD AUTO: 286 K/UL — SIGNIFICANT CHANGE UP (ref 150–400)
PLATELET # BLD AUTO: 287 K/UL — SIGNIFICANT CHANGE UP (ref 150–400)
POTASSIUM SERPL-MCNC: 3.7 MMOL/L — SIGNIFICANT CHANGE UP (ref 3.5–5.3)
POTASSIUM SERPL-MCNC: 3.7 MMOL/L — SIGNIFICANT CHANGE UP (ref 3.5–5.3)
POTASSIUM SERPL-SCNC: 3.7 MMOL/L — SIGNIFICANT CHANGE UP (ref 3.5–5.3)
POTASSIUM SERPL-SCNC: 3.7 MMOL/L — SIGNIFICANT CHANGE UP (ref 3.5–5.3)
PROT SERPL-MCNC: 5.2 G/DL — LOW (ref 6–8.3)
PROTHROM AB SERPL-ACNC: 16.6 SEC — HIGH (ref 9.8–12.7)
RBC # BLD: 2.99 M/UL — LOW (ref 4.2–5.8)
RBC # BLD: 3.13 M/UL — LOW (ref 4.2–5.8)
RBC # BLD: 3.18 M/UL — LOW (ref 4.2–5.8)
RBC # FLD: 12.8 % — SIGNIFICANT CHANGE UP (ref 10.3–14.5)
RBC # FLD: 12.9 % — SIGNIFICANT CHANGE UP (ref 10.3–14.5)
RBC # FLD: 13.2 % — SIGNIFICANT CHANGE UP (ref 10.3–14.5)
SODIUM SERPL-SCNC: 140 MMOL/L — SIGNIFICANT CHANGE UP (ref 135–145)
SODIUM SERPL-SCNC: 143 MMOL/L — SIGNIFICANT CHANGE UP (ref 135–145)
WBC # BLD: 6.6 K/UL — SIGNIFICANT CHANGE UP (ref 3.8–10.5)
WBC # BLD: 6.7 K/UL — SIGNIFICANT CHANGE UP (ref 3.8–10.5)
WBC # BLD: 7.1 K/UL — SIGNIFICANT CHANGE UP (ref 3.8–10.5)
WBC # FLD AUTO: 6.6 K/UL — SIGNIFICANT CHANGE UP (ref 3.8–10.5)
WBC # FLD AUTO: 6.7 K/UL — SIGNIFICANT CHANGE UP (ref 3.8–10.5)
WBC # FLD AUTO: 7.1 K/UL — SIGNIFICANT CHANGE UP (ref 3.8–10.5)

## 2017-09-17 RX ORDER — SIMETHICONE 80 MG/1
80 TABLET, CHEWABLE ORAL EVERY 4 HOURS
Qty: 0 | Refills: 0 | Status: DISCONTINUED | OUTPATIENT
Start: 2017-09-17 | End: 2017-09-25

## 2017-09-17 RX ADMIN — Medication 100 MILLIGRAM(S): at 06:48

## 2017-09-17 RX ADMIN — Medication 100 MILLIGRAM(S): at 13:24

## 2017-09-17 RX ADMIN — PANTOPRAZOLE SODIUM 40 MILLIGRAM(S): 20 TABLET, DELAYED RELEASE ORAL at 06:47

## 2017-09-17 RX ADMIN — Medication 200 MILLIGRAM(S): at 06:47

## 2017-09-17 RX ADMIN — Medication 1: at 11:20

## 2017-09-17 RX ADMIN — Medication 1 DROP(S): at 06:38

## 2017-09-17 RX ADMIN — Medication 1 DROP(S): at 17:11

## 2017-09-17 RX ADMIN — Medication 100 MILLIGRAM(S): at 21:49

## 2017-09-17 RX ADMIN — Medication 200 MILLIGRAM(S): at 17:11

## 2017-09-17 RX ADMIN — PANTOPRAZOLE SODIUM 40 MILLIGRAM(S): 20 TABLET, DELAYED RELEASE ORAL at 17:11

## 2017-09-17 NOTE — PROGRESS NOTE ADULT - ASSESSMENT
71M from home, still works PMH of DM, HTN, Kidney stones, HLD who presents to hospital for syncope, possible seizure while on the way to work and episode of bloody stool incontinence.  Pt had 2 RRTs today for 2 episodes of bright red bloody bowel movement and hypotension to 80s, s/p 3 litre boluses and 1 unit pRBC being given. ICU accepted the pt as he is hemodynamically unstable sec to active Lower GI bleed.     1)  Hypovolemic shock.    resolved. s/p code fusion. s/p total 8PRBC 4FFP  Lower GI bleed- diverticulosis bleed with CT scan questionable diverticulitis without relevant physical exam finding(LLQ tenderness). Also with hemorrhagic shock and symptomatic anemia from blood loss.  -monitor CBC q12hrs for now. NPO  -GI and surgery consulted.  -No aspirin or anticoagulants or heparin/  Lovenox at this time.  -CTA -negative for source of bleed. CT scan negative for bleed but concern for cecal diverticulitis.   -NGT with no evident of upper GI bleed. Bleeding scan negative.   -Metro and cipro while ruling out diverticulitis.   -colonoscopy shows no bleeding site in the left colon, likely bleed could be in right colon.  -Currently the Hb is stable    2) Diabetes.  Plan: HSS and accu chek.   A1C 7.0.     3) Need for prophylactic measure.  no chemical AC due to recent bleed.   on Protonix BID IV. 71M from home, still works PMH of DM, HTN, Kidney stones, HLD who presents to hospital for syncope, possible seizure while on the way to work and episode of bloody stool incontinence.  Pt had 2 RRTs today for 2 episodes of bright red bloody bowel movement and hypotension to 80s, s/p 3 litre boluses and 1 unit pRBC being given. ICU accepted the pt as he is hemodynamically unstable sec to active Lower GI bleed.     1)  Hypovolemic shock.    resolved. s/p code fusion. s/p total 8PRBC 4FFP  Lower GI bleed- diverticulosis bleed with CT scan questionable diverticulitis without relevant physical exam finding(LLQ tenderness). Also with hemorrhagic shock and symptomatic anemia from blood loss.  -monitor CBC q12hrs for now. NPO  -GI and surgery consulted.  -No aspirin or anticoagulants or heparin/  Lovenox at this time.  -CTA -negative for source of bleed. CT scan negative for bleed but concern for cecal diverticulitis.   -NGT with no evident of upper GI bleed. Bleeding scan negative.   -Metro and cipro while ruling out diverticulitis.   -colonoscopy shows no bleeding site in the left colon, likely bleed could be in right colon.  -Currently the Hb is stable  - patient had one episode of bloody bowel movement today, in house surgeon was called and they recommend IR or Repeat GI consult.     2) Diabetes.  Plan: HSS and accu chek.   A1C 7.0.     3) Need for prophylactic measure.  no chemical AC due to recent bleed.   on Protonix BID IV.

## 2017-09-17 NOTE — PROGRESS NOTE ADULT - PROBLEM SELECTOR PLAN 1
Need repeat colonoscopy/bleeding scan  If still bleeding then get IR for selective mesenteric embolization  Surgery will potentially entail subtotal/total colectomy with accompanying high morbidity and mortality and should be the last salvage resort

## 2017-09-17 NOTE — PROGRESS NOTE ADULT - SUBJECTIVE AND OBJECTIVE BOX
[ X  ] ICU           [   ] CCU          [   ] Medical Floor      Patient remains in ICU for monitoring , Nursing staff reported one bloody BM yeasterday but no BM today. Patient is comfortable. No other complaints reported, No abdominal pain, N/V, hematemesis, melena, fever, chills, chest pain, SOB, cough or diarrhea reported.    VITALS  ICU Vital Signs Last 24 Hrs  T(C): 36.9 (17 Sep 2017 06:00), Max: 37.4 (16 Sep 2017 18:00)  T(F): 98.4 (17 Sep 2017 06:00), Max: 99.3 (16 Sep 2017 18:00)  HR: 71 (17 Sep 2017 07:00) (64 - 80)  BP: 129/68 (17 Sep 2017 07:00) (108/69 - 135/80)  BP(mean): 84 (17 Sep 2017 07:00) (70 - 97)  R: 20 (17 Sep 2017 07:00) (10 - 22)  SpO2: 98% (17 Sep 2017 07:00) (95% - 100%)       MEDICATIONS  (STANDING):  atorvastatin 40 milliGRAM(s) Oral at bedtime  pantoprazole  Injectable 40 milliGRAM(s) IV Push two times a day  insulin lispro (HumaLOG) corrective regimen sliding scale   SubCutaneous every 6 hours  ciprofloxacin   IVPB      ciprofloxacin   IVPB 400 milliGRAM(s) IV Intermittent every 12 hours  metroNIDAZOLE  IVPB      metroNIDAZOLE  IVPB 500 milliGRAM(s) IV Intermittent every 8 hours  artificial  tears Solution 1 Drop(s) Both EYES two times a day    MEDICATIONS  (PRN):  HYDROmorphone  Injectable 0.5 milliGRAM(s) IV Push every 4 hours PRN Moderate Pain (4 - 6)                            9.0    6.7   )-----------( 269      ( 17 Sep 2017 05:56 )             26.1       09-17    143  |  109<H>  |  10  ----------------------------<  122<H>  3.7   |  27  |  0.94    Ca    8.2<L>      17 Sep 2017 05:56  Phos  2.7     09-17  Mg     1.9     09-17    TPro  5.2<L>  /  Alb  2.5<L>  /  TBili  0.5  /  DBili  x   /  AST  63<H>  /  ALT  52  /  AlkPhos  57  09-17

## 2017-09-17 NOTE — PROGRESS NOTE ADULT - SUBJECTIVE AND OBJECTIVE BOX
HPI:  71M from home, still works PMH of DM, HTN, Kidney stones, HLD who presents to hospital for syncope, possible seizure while on the way to work and episode of bloody stool incontinence. Patient says he was on his way to work in the morning, on the train going to [employer] and when he was getting off, he suddenly blacked out. As per friend who was with him, he was unconscious for 10 mins and when he woke up, he was very dizzy and had episode of stool incontinence that was very bloody. His friend told him that he had no convulsions, no period of confusion when he woke up, no tongue biting but that he was very sweaty. Patient denies any chest pain before, during or after the event. Friend got him to work, gave him change of clothes and then took him here to the hospital. (09 Sep 2017 17:45)      Patient is a 71y old  Male who presents with a chief complaint of Passing out, fecal incontinence, blood in stool (09 Sep 2017 17:45)      INTERVAL HPI/OVERNIGHT EVENTS:  T(C): 36.9 (09-17-17 @ 06:00), Max: 37.4 (09-16-17 @ 18:00)  HR: 71 (09-17-17 @ 10:00) (64 - 80)  BP: 124/70 (09-17-17 @ 10:00) (108/69 - 135/80)  RR: 19 (09-17-17 @ 10:00) (10 - 22)  SpO2: 99% (09-17-17 @ 10:00) (95% - 100%)  Wt(kg): --  I&O's Summary    16 Sep 2017 07:01  -  17 Sep 2017 07:00  --------------------------------------------------------  IN: 1110 mL / OUT: 2700 mL / NET: -1590 mL        REVIEW OF SYSTEMS: denies fever, chills, SOB, palpitations, chest pain, abdominal pain, nausea, vomitting, diarrhea, constipation, dizziness    MEDICATIONS  (STANDING):  atorvastatin 40 milliGRAM(s) Oral at bedtime  pantoprazole  Injectable 40 milliGRAM(s) IV Push two times a day  insulin lispro (HumaLOG) corrective regimen sliding scale   SubCutaneous every 6 hours  ciprofloxacin   IVPB      ciprofloxacin   IVPB 400 milliGRAM(s) IV Intermittent every 12 hours  metroNIDAZOLE  IVPB      metroNIDAZOLE  IVPB 500 milliGRAM(s) IV Intermittent every 8 hours  artificial  tears Solution 1 Drop(s) Both EYES two times a day    MEDICATIONS  (PRN):  HYDROmorphone  Injectable 0.5 milliGRAM(s) IV Push every 4 hours PRN Moderate Pain (4 - 6)  simethicone 80 milliGRAM(s) Chew every 4 hours PRN Gas,indigestion      PHYSICAL EXAM:  GENERAL: NAD, well-groomed, well-developed  HEAD:  Atraumatic, Normocephalic  EYES: EOMI, PERRLA, conjunctiva and sclera clear  ENMT: No tonsillar erythema, exudates, or enlargement; Moist mucous membranes, Good dentition, No lesions  NECK: Supple, No JVD, Normal thyroid  NERVOUS SYSTEM:  Alert & Oriented X3, Good concentration; Motor Strength 5/5 B/L upper and lower extremities; DTRs 2+ intact and symmetric  CHEST/LUNG: Clear to percussion bilaterally; No rales, rhonchi, wheezing, or rubs  HEART: Regular rate and rhythm; No murmurs, rubs, or gallops  ABDOMEN: Soft, Nontender, Nondistended; Bowel sounds present  EXTREMITIES:  2+ Peripheral Pulses, No clubbing, cyanosis, or edema  LYMPH: No lymphadenopathy noted  SKIN: No rashes or lesions  LABS:                        9.0    6.7   )-----------( 269      ( 17 Sep 2017 05:56 )             26.1     09-17    143  |  109<H>  |  10  ----------------------------<  122<H>  3.7   |  27  |  0.94    Ca    8.2<L>      17 Sep 2017 05:56  Phos  2.7     09-17  Mg     1.9     09-17    TPro  5.2<L>  /  Alb  2.5<L>  /  TBili  0.5  /  DBili  x   /  AST  63<H>  /  ALT  52  /  AlkPhos  57  09-17        CAPILLARY BLOOD GLUCOSE  134 (17 Sep 2017 06:00)  121 (16 Sep 2017 23:00)  187 (16 Sep 2017 12:00)

## 2017-09-17 NOTE — PROGRESS NOTE ADULT - ASSESSMENT
1. GI bleeding     - S/p colonoscopy by Dr Villavicencio Right side colonic diverticulosis with bleeding on  9/15    2. Anemia (H/H stable)    3. Diverticulitis    Suggestions:    1. Monitor H/H closely  2. Transfuse PRBC as needed  3. Surgical follow up   4. Consider repeat bleeding scan or CT-angio if patient with active bleeding  5. Continue antibiotics  6. Protonix daily  7. DVT prophylaxis  8. Avoid NSAID's

## 2017-09-17 NOTE — PROGRESS NOTE ADULT - ASSESSMENT
no furthur bleed,  no abd pain  getting oral fluids   hgb stable  watch  if bleeds then surgery.  GI  and Surgery to f/u  d/w daughter if furthur bleed may go for surgery which carries risk  she understand.

## 2017-09-17 NOTE — PROGRESS NOTE ADULT - SUBJECTIVE AND OBJECTIVE BOX
INTERVAL HPI/ OVERNIGHT EVENTS: No major event overnight.         Antimicrobial:  ciprofloxacin   IVPB      ciprofloxacin   IVPB 400 milliGRAM(s) IV Intermittent every 12 hours  metroNIDAZOLE  IVPB      metroNIDAZOLE  IVPB 500 milliGRAM(s) IV Intermittent every 8 hours      Other:  atorvastatin 40 milliGRAM(s) Oral at bedtime  pantoprazole  Injectable 40 milliGRAM(s) IV Push two times a day  insulin lispro (HumaLOG) corrective regimen sliding scale   SubCutaneous every 6 hours  artificial  tears Solution 1 Drop(s) Both EYES two times a day  HYDROmorphone  Injectable 0.5 milliGRAM(s) IV Push every 4 hours PRN    atorvastatin 40 milliGRAM(s) Oral at bedtime  pantoprazole  Injectable 40 milliGRAM(s) IV Push two times a day  insulin lispro (HumaLOG) corrective regimen sliding scale   SubCutaneous every 6 hours  ciprofloxacin   IVPB      ciprofloxacin   IVPB 400 milliGRAM(s) IV Intermittent every 12 hours  metroNIDAZOLE  IVPB      metroNIDAZOLE  IVPB 500 milliGRAM(s) IV Intermittent every 8 hours  artificial  tears Solution 1 Drop(s) Both EYES two times a day  HYDROmorphone  Injectable 0.5 milliGRAM(s) IV Push every 4 hours PRN    Drug Dosing Weight    Weight (kg): 60 (11 Sep 2017 13:19)    CENTRAL LINE: [ ] YES [x ] NO  LOCATION:   DATE INSERTED:  REMOVE: [ ] YES [ ] NO  EXPLAIN:    GARLAND: [ ] YES [x ] NO    DATE INSERTED:  REMOVE:  [ ] YES [ ] NO  EXPLAIN:    A-LINE:  [ ] YES [x ] NO  LOCATION:   DATE INSERTED:  REMOVE:  [ ] YES [ ] NO  EXPLAIN:    PMH -reviewed admission note, no change since admission      ICU Vital Signs Last 24 Hrs  T(C): 37 (17 Sep 2017 00:00), Max: 37.4 (16 Sep 2017 18:00)  T(F): 98.6 (17 Sep 2017 00:00), Max: 99.3 (16 Sep 2017 18:00)  HR: 69 (16 Sep 2017 23:00) (63 - 82)  BP: 129/70 (16 Sep 2017 23:00) (109/62 - 135/80)  BP(mean): 84 (16 Sep 2017 23:00) (70 - 97)  ABP: --  ABP(mean): --  RR: 20 (16 Sep 2017 23:00) (11 - 22)  SpO2: 97% (16 Sep 2017 23:00) (96% - 100%)            09-15 @ 07:01  -  09-16 @ 07:00  --------------------------------------------------------  IN: 1800 mL / OUT: 2350 mL / NET: -550 mL            PHYSICAL EXAM:    CONSTITUTIONAL: No fever, weight loss, or fatigue  	EYES: No eye pain, visual disturbances, or discharge  	ENMT:  No difficulty hearing, tinnitus, vertigo; No sinus or throat pain  	NECK: No pain or stiffness  	RESPIRATORY: No cough, wheezing, chills or hemoptysis; No shortness of breath  	CARDIOVASCULAR: syncope, dizziness - no chest pain, no palpitations  	GASTROINTESTINAL: mild tenderness in suprapubic area   	GENITOURINARY: No dysuria, frequency, hematuria, or incontinence  	NEUROLOGICAL: No headaches, memory loss, loss of strength, numbness, or tremors  	SKIN: No itching, burning, rashes, or lesions   	LYMPH NODES: No enlarged glands  	ENDOCRINE: No heat or cold intolerance; No hair loss  	MUSCULOSKELETAL: No joint pain or swelling; No muscle, back, or extremity pain  	PSYCHIATRIC: No depression, anxiety, mood swings, or difficulty sleeping  	HEME/LYMPH: No easy bruising, or bleeding gums    ALLERY AND IMMUNOLOGIC: No hives or eczema          RADIOLOGY & ADDITIONAL STUDIES REVIEWED:  ***    [ ]GOALS OF CARE DISCUSSION WITH PATIENT/FAMILY/PROXY:    CRITICAL CARE TIME SPENT: 35 minutes

## 2017-09-17 NOTE — PROGRESS NOTE ADULT - SUBJECTIVE AND OBJECTIVE BOX
Called to evaluate this patient who was already consulted by surgery for lower GI bleed (see consult sep 10, 2017) for new episode of LGIB.  Pt apparently had few bloody bowel movements recently.  Pt denies abdominal pain, dizziness, CP  Pt had    PE: A & O X 3, NAD  ICU Vital Signs Last 24 Hrs  T(C): 36.7 (17 Sep 2017 14:41), Max: 37.3 (16 Sep 2017 20:50)  T(F): 98 (17 Sep 2017 14:41), Max: 99.1 (16 Sep 2017 20:50)  HR: 74 (17 Sep 2017 18:00) (64 - 89)  BP: 124/68 (17 Sep 2017 18:00) (108/69 - 135/80)  BP(mean): 81 (17 Sep 2017 18:00) (70 - 90)  ABP: --  ABP(mean): --  RR: 19 (17 Sep 2017 18:00) (10 - 26)  SpO2: 100% (17 Sep 2017 18:00) (95% - 100%)    Abdomen; soft, NT, ND, +BS                          9.7    7.1   )-----------( 287      ( 17 Sep 2017 14:35 )             27.6     09-17    140  |  107  |  12  ----------------------------<  190<H>  3.7   |  27  |  1.02    Ca    8.1<L>      17 Sep 2017 14:35  Phos  2.7     09-17  Mg     1.9     09-17    TPro  5.2<L>  /  Alb  2.5<L>  /  TBili  0.5  /  DBili  x   /  AST  63<H>  /  ALT  52  /  AlkPhos  57  09-17

## 2017-09-17 NOTE — CHART NOTE - NSCHARTNOTEFT_GEN_A_CORE
PGY 1 ICU Downgrade note   71M from home, still works PMH of DM, HTN, Kidney stones, HLD who presents to hospital for syncope, possible seizure while on the way to work and episode of bloody stool . Patient says he was on his way to work in the morning, on the train going to [place] and when he was getting off, he suddenly blacked out. As per friend who was with him, he was unconscious for 10 mins and when he woke up, he was very dizzy and had episode of stool incontinence that was very bloody. His friend told him that he had no convulsions, no period of confusion when he woke up, no tongue biting but that he was very sweaty. Patient denies any chest pain before, during or after the event. Friend got him to work, gave him change of clothes and then took him here to the hospital.   Patient is  admitted for workup of syncope, rule out of seizure and anemia due to lower GI bleed x2 episodes today

## 2017-09-18 LAB
ALBUMIN SERPL ELPH-MCNC: 2.6 G/DL — LOW (ref 3.5–5)
ALP SERPL-CCNC: 60 U/L — SIGNIFICANT CHANGE UP (ref 40–120)
ALT FLD-CCNC: 74 U/L DA — HIGH (ref 10–60)
ANION GAP SERPL CALC-SCNC: 5 MMOL/L — SIGNIFICANT CHANGE UP (ref 5–17)
AST SERPL-CCNC: 78 U/L — HIGH (ref 10–40)
BILIRUB SERPL-MCNC: 0.5 MG/DL — SIGNIFICANT CHANGE UP (ref 0.2–1.2)
BUN SERPL-MCNC: 13 MG/DL — SIGNIFICANT CHANGE UP (ref 7–18)
CALCIUM SERPL-MCNC: 8.4 MG/DL — SIGNIFICANT CHANGE UP (ref 8.4–10.5)
CHLORIDE SERPL-SCNC: 108 MMOL/L — SIGNIFICANT CHANGE UP (ref 96–108)
CO2 SERPL-SCNC: 28 MMOL/L — SIGNIFICANT CHANGE UP (ref 22–31)
CREAT SERPL-MCNC: 0.99 MG/DL — SIGNIFICANT CHANGE UP (ref 0.5–1.3)
GLUCOSE SERPL-MCNC: 125 MG/DL — HIGH (ref 70–99)
HCT VFR BLD CALC: 26.4 % — LOW (ref 39–50)
HCT VFR BLD CALC: 26.5 % — LOW (ref 39–50)
HCT VFR BLD CALC: 27.6 % — LOW (ref 39–50)
HGB BLD-MCNC: 9.2 G/DL — LOW (ref 13–17)
HGB BLD-MCNC: 9.3 G/DL — LOW (ref 13–17)
HGB BLD-MCNC: 9.8 G/DL — LOW (ref 13–17)
MAGNESIUM SERPL-MCNC: 2 MG/DL — SIGNIFICANT CHANGE UP (ref 1.6–2.6)
MCHC RBC-ENTMCNC: 30.9 PG — SIGNIFICANT CHANGE UP (ref 27–34)
MCHC RBC-ENTMCNC: 31.3 PG — SIGNIFICANT CHANGE UP (ref 27–34)
MCHC RBC-ENTMCNC: 31.4 PG — SIGNIFICANT CHANGE UP (ref 27–34)
MCHC RBC-ENTMCNC: 34.9 GM/DL — SIGNIFICANT CHANGE UP (ref 32–36)
MCHC RBC-ENTMCNC: 35.3 GM/DL — SIGNIFICANT CHANGE UP (ref 32–36)
MCHC RBC-ENTMCNC: 35.3 GM/DL — SIGNIFICANT CHANGE UP (ref 32–36)
MCV RBC AUTO: 88.5 FL — SIGNIFICANT CHANGE UP (ref 80–100)
MCV RBC AUTO: 88.9 FL — SIGNIFICANT CHANGE UP (ref 80–100)
MCV RBC AUTO: 89.1 FL — SIGNIFICANT CHANGE UP (ref 80–100)
PHOSPHATE SERPL-MCNC: 3.2 MG/DL — SIGNIFICANT CHANGE UP (ref 2.5–4.5)
PLATELET # BLD AUTO: 265 K/UL — SIGNIFICANT CHANGE UP (ref 150–400)
PLATELET # BLD AUTO: 282 K/UL — SIGNIFICANT CHANGE UP (ref 150–400)
PLATELET # BLD AUTO: 303 K/UL — SIGNIFICANT CHANGE UP (ref 150–400)
POTASSIUM SERPL-MCNC: 3.9 MMOL/L — SIGNIFICANT CHANGE UP (ref 3.5–5.3)
POTASSIUM SERPL-SCNC: 3.9 MMOL/L — SIGNIFICANT CHANGE UP (ref 3.5–5.3)
PROT SERPL-MCNC: 5.5 G/DL — LOW (ref 6–8.3)
RBC # BLD: 2.98 M/UL — LOW (ref 4.2–5.8)
RBC # BLD: 2.98 M/UL — LOW (ref 4.2–5.8)
RBC # BLD: 3.1 M/UL — LOW (ref 4.2–5.8)
RBC # FLD: 13 % — SIGNIFICANT CHANGE UP (ref 10.3–14.5)
RBC # FLD: 13.1 % — SIGNIFICANT CHANGE UP (ref 10.3–14.5)
RBC # FLD: 13.4 % — SIGNIFICANT CHANGE UP (ref 10.3–14.5)
SODIUM SERPL-SCNC: 141 MMOL/L — SIGNIFICANT CHANGE UP (ref 135–145)
WBC # BLD: 11.5 K/UL — HIGH (ref 3.8–10.5)
WBC # BLD: 7.8 K/UL — SIGNIFICANT CHANGE UP (ref 3.8–10.5)
WBC # BLD: 8.3 K/UL — SIGNIFICANT CHANGE UP (ref 3.8–10.5)
WBC # FLD AUTO: 11.5 K/UL — HIGH (ref 3.8–10.5)
WBC # FLD AUTO: 7.8 K/UL — SIGNIFICANT CHANGE UP (ref 3.8–10.5)
WBC # FLD AUTO: 8.3 K/UL — SIGNIFICANT CHANGE UP (ref 3.8–10.5)

## 2017-09-18 RX ORDER — SODIUM CHLORIDE 9 MG/ML
1000 INJECTION, SOLUTION INTRAVENOUS
Qty: 0 | Refills: 0 | Status: DISCONTINUED | OUTPATIENT
Start: 2017-09-18 | End: 2017-09-19

## 2017-09-18 RX ADMIN — PANTOPRAZOLE SODIUM 40 MILLIGRAM(S): 20 TABLET, DELAYED RELEASE ORAL at 05:00

## 2017-09-18 RX ADMIN — ATORVASTATIN CALCIUM 40 MILLIGRAM(S): 80 TABLET, FILM COATED ORAL at 21:35

## 2017-09-18 RX ADMIN — SODIUM CHLORIDE 50 MILLILITER(S): 9 INJECTION, SOLUTION INTRAVENOUS at 09:03

## 2017-09-18 RX ADMIN — Medication 200 MILLIGRAM(S): at 05:00

## 2017-09-18 RX ADMIN — Medication 1 DROP(S): at 05:00

## 2017-09-18 RX ADMIN — Medication 200 MILLIGRAM(S): at 17:08

## 2017-09-18 RX ADMIN — Medication 1 DROP(S): at 17:08

## 2017-09-18 RX ADMIN — PANTOPRAZOLE SODIUM 40 MILLIGRAM(S): 20 TABLET, DELAYED RELEASE ORAL at 17:08

## 2017-09-18 RX ADMIN — Medication 100 MILLIGRAM(S): at 21:35

## 2017-09-18 RX ADMIN — Medication 100 MILLIGRAM(S): at 05:00

## 2017-09-18 RX ADMIN — Medication 100 MILLIGRAM(S): at 14:20

## 2017-09-18 RX ADMIN — HYDROMORPHONE HYDROCHLORIDE 0.5 MILLIGRAM(S): 2 INJECTION INTRAMUSCULAR; INTRAVENOUS; SUBCUTANEOUS at 12:52

## 2017-09-18 RX ADMIN — HYDROMORPHONE HYDROCHLORIDE 0.5 MILLIGRAM(S): 2 INJECTION INTRAMUSCULAR; INTRAVENOUS; SUBCUTANEOUS at 14:20

## 2017-09-18 NOTE — PROGRESS NOTE ADULT - SUBJECTIVE AND OBJECTIVE BOX
Patient examined at bedside, no complaints.   Last bloody BM was yesterday, nothing overnight  H&H is stable  No nausea, no vomiting  NPO        T(F): 99 (09-18-17 @ 08:22), Max: 99.2 (09-17-17 @ 19:37)  HR: 77 (09-18-17 @ 09:00) (64 - 89)  BP: 117/63 (09-18-17 @ 09:00) (106/61 - 143/68)  RR: 18 (09-18-17 @ 09:00) (11 - 26)  SpO2: 97% (09-18-17 @ 09:00) (95% - 100%)  Wt(kg): --          Physical Exam  General: AAOx3, No acute distress  Skin: No jaundice, no icterus  Abdomen: soft, mild lower abdominal tenderness, mildly distended, no rebound tenderness, no guarding, no palpable masses  : Normal external genitalia  Extremities: non edematous, no calf pain bilaterally

## 2017-09-18 NOTE — PROGRESS NOTE ADULT - ASSESSMENT
71M from home, still works PMH of DM, HTN, Kidney stones, HLD who presents to hospital for syncope, possible seizure while on the way to work and episode of bloody stool incontinence.  Pt had 2 RRTs today for 2 episodes of bright red bloody bowel movement and hypotension to 80s, s/p 3 litre boluses and 1 unit pRBC being given. ICU accepted the pt as he is hemodynamically unstable sec to active Lower GI bleed.     1)  Hypovolemic shock 2/2 LGI bleed.     s/p code fusion. s/p total 8PRBC 4FFP  Lower GI bleed-possible diverticulosis bleed with CT scan questionable diverticulitis without relevant physical exam finding(LLQ tenderness). Also with hemorrhagic shock and symptomatic anemia from blood loss.  -monitor CBC  NPO  -GI and surgery consulted.  -No aspirin or anticoagulants or heparin/  Lovenox at this time.  -CTA -negative for source of bleed.  but concern for cecal diverticulitis.   -NGT with no evident of upper GI bleed. Bleeding scan negative.   -Metro and cipro while ruling out diverticulitis.   -colonoscopy shows no bleeding site in the left colon, likely bleed could be in right colon.  -Currently the Hb is stable  - patient had one episode of bloody bowel movement today, in house surgeon was called and they recommend IR or Repeat GI consult.     2) Diabetes.  Plan: HSS and accu chek.   A1C 7.0.     3) Need for prophylactic measure.  no chemical AC due to recent bleed.   on Protonix BID IV.

## 2017-09-18 NOTE — PROGRESS NOTE ADULT - SUBJECTIVE AND OBJECTIVE BOX
Consulted regarding this 72 y/o male with lower GI bleeding thought to be secondary to diverticular hemorrhage. Previous CTA and bleeding scan were both negative.  Patient currently hemodynamically stable, but clinically with persistent intermittent bleeding. Recommend repeat bleeding scan or CTA if there is concern for acute hemorrhage.  No role for angiography at this point if CTA and bleeding scan were both negative, as both studies are more sensitive for acute hemorrhage compared to conventional angiography.    Discussed With Dr. Aldana in the ICU.

## 2017-09-18 NOTE — PROGRESS NOTE ADULT - ASSESSMENT
70 y/o male with LGIB    1. If rebleeds, recommend IR embolization  2. Trend H&H, transfuse as needed  3. Will continue to follow  4. GI follow up

## 2017-09-18 NOTE — PROGRESS NOTE ADULT - SUBJECTIVE AND OBJECTIVE BOX
HPI:  diverticular bleed  had small blood  but HGB is stable  seen by surgery      Patient is a 71y old  Male who presents with a chief complaint of Passing out, fecal incontinence, blood in stool (09 Sep 2017 17:45)      INTERVAL HPI/OVERNIGHT EVENTS:  T(C): 37.2 (09-18-17 @ 08:22), Max: 37.3 (09-17-17 @ 19:37)  HR: 79 (09-18-17 @ 11:00) (64 - 89)  BP: 99/64 (09-18-17 @ 11:00) (99/64 - 143/68)  RR: 13 (09-18-17 @ 11:00) (11 - 26)  SpO2: 98% (09-18-17 @ 11:00) (95% - 100%)  Wt(kg): --  I&O's Summary    17 Sep 2017 07:01  -  18 Sep 2017 07:00  --------------------------------------------------------  IN: 1360 mL / OUT: 2025 mL / NET: -665 mL    18 Sep 2017 07:01  -  18 Sep 2017 11:36  --------------------------------------------------------  IN: 100 mL / OUT: 200 mL / NET: -100 mL        REVIEW OF SYSTEMS: denies fever, chills, SOB, palpitations, chest pain, abdominal pain, nausea, vomitting, diarrhea, constipation, dizziness    MEDICATIONS  (STANDING):  atorvastatin 40 milliGRAM(s) Oral at bedtime  pantoprazole  Injectable 40 milliGRAM(s) IV Push two times a day  insulin lispro (HumaLOG) corrective regimen sliding scale   SubCutaneous every 6 hours  ciprofloxacin   IVPB      ciprofloxacin   IVPB 400 milliGRAM(s) IV Intermittent every 12 hours  metroNIDAZOLE  IVPB      metroNIDAZOLE  IVPB 500 milliGRAM(s) IV Intermittent every 8 hours  artificial  tears Solution 1 Drop(s) Both EYES two times a day  dextrose 5% + sodium chloride 0.9%. 1000 milliLiter(s) (50 mL/Hr) IV Continuous <Continuous>    MEDICATIONS  (PRN):  HYDROmorphone  Injectable 0.5 milliGRAM(s) IV Push every 4 hours PRN Moderate Pain (4 - 6)  simethicone 80 milliGRAM(s) Chew every 4 hours PRN Gas,indigestion      PHYSICAL EXAM:  GENERAL: NAD, well-groomed, well-developed  HEAD:  Atraumatic, Normocephalic  EYES: EOMI, PERRLA, conjunctiva and sclera clear  ENMT: No tonsillar erythema, exudates, or enlargement; Moist mucous membranes, Good dentition, No lesions  NECK: Supple, No JVD, Normal thyroid  NERVOUS SYSTEM:  Alert & Oriented X3, Good concentration; Motor Strength 5/5 B/L upper and lower extremities; DTRs 2+ intact and symmetric  CHEST/LUNG: Clear to percussion bilaterally; No rales, rhonchi, wheezing, or rubs  HEART: Regular rate and rhythm; No murmurs, rubs, or gallops  ABDOMEN: Soft, Nontender, Nondistended; Bowel sounds present  EXTREMITIES:  2+ Peripheral Pulses, No clubbing, cyanosis, or edema  LYMPH: No lymphadenopathy noted  SKIN: No rashes or lesions  LABS:                        9.3    8.3   )-----------( 265      ( 18 Sep 2017 04:41 )             26.5     09-18    141  |  108  |  13  ----------------------------<  125<H>  3.9   |  28  |  0.99    Ca    8.4      18 Sep 2017 04:41  Phos  3.2     09-18  Mg     2.0     09-18    TPro  5.5<L>  /  Alb  2.6<L>  /  TBili  0.5  /  DBili  x   /  AST  78<H>  /  ALT  74<H>  /  AlkPhos  60  09-18    PT/INR - ( 17 Sep 2017 14:35 )   PT: 16.6 sec;   INR: 1.51 ratio             CAPILLARY BLOOD GLUCOSE  88 (18 Sep 2017 05:00)  110 (17 Sep 2017 23:00)  156 (17 Sep 2017 17:00)

## 2017-09-18 NOTE — PROGRESS NOTE ADULT - ASSESSMENT
1. Diverticulitis  2. Abdominal pain most likely related to diverticulitis  3. GI bleeding(most likely diverticular bleeding)  4. Anemia (H/H stable)    Suggestions:    1. Continue antibiotics  2. Monitor H/H closely  3. Transfuse PRBC as needed  4. Consider bleeding scan right away if active leeding  5. Surgical evaluation  6. Avoid NSAID's  7. Protonix daily  8. DVT prophylaxis

## 2017-09-18 NOTE — PROGRESS NOTE ADULT - SUBJECTIVE AND OBJECTIVE BOX
[ X  ] ICU            [   ] CCU               [   ] Medical Floor      Patient remains in ICU for monitoring. Patient c/o lower abdominal pain but denies N/V, hematemesis, hematochezia, melena, fever, chills, chest pain, SOB, cough or diarrhea reported.      VITALS  Vital Signs Last 24 Hrs  T(C): 37.2 (18 Sep 2017 08:22), Max: 37.3 (17 Sep 2017 19:37)  T(F): 99 (18 Sep 2017 08:22), Max: 99.2 (17 Sep 2017 19:37)  HR: 77 (18 Sep 2017 12:00) (64 - 79)  BP: 111/62 (18 Sep 2017 12:00) (99/64 - 143/68)  BP(mean): 73 (18 Sep 2017 12:00) (63 - 87)  RR: 16 (18 Sep 2017 12:00) (11 - 26)  SpO2: 96% (18 Sep 2017 12:00) (95% - 100%)         MEDICATIONS  (STANDING):  atorvastatin 40 milliGRAM(s) Oral at bedtime  pantoprazole  Injectable 40 milliGRAM(s) IV Push two times a day  insulin lispro (HumaLOG) corrective regimen sliding scale   SubCutaneous every 6 hours  ciprofloxacin   IVPB      ciprofloxacin   IVPB 400 milliGRAM(s) IV Intermittent every 12 hours  metroNIDAZOLE  IVPB      metroNIDAZOLE  IVPB 500 milliGRAM(s) IV Intermittent every 8 hours  artificial  tears Solution 1 Drop(s) Both EYES two times a day  dextrose 5% + sodium chloride 0.9%. 1000 milliLiter(s) (50 mL/Hr) IV Continuous <Continuous>    MEDICATIONS  (PRN):  HYDROmorphone  Injectable 0.5 milliGRAM(s) IV Push every 4 hours PRN Moderate Pain (4 - 6)  simethicone 80 milliGRAM(s) Chew every 4 hours PRN Gas,indigestion                            9.3    8.3   )-----------( 265      ( 18 Sep 2017 04:41 )             26.5       09-18    141  |  108  |  13  ----------------------------<  125<H>  3.9   |  28  |  0.99    Ca    8.4      18 Sep 2017 04:41  Phos  3.2     09-18  Mg     2.0     09-18    TPro  5.5<L>  /  Alb  2.6<L>  /  TBili  0.5  /  DBili  x   /  AST  78<H>  /  ALT  74<H>  /  AlkPhos  60  09-18      PT/INR - ( 17 Sep 2017 14:35 )   PT: 16.6 sec;   INR: 1.51 ratio

## 2017-09-18 NOTE — PROGRESS NOTE ADULT - SUBJECTIVE AND OBJECTIVE BOX
INTERVAL HPI/OVERNIGHT EVENTS: Large blood BM yesterday, currently patient is stable H/H and vitals.      Antimicrobial:  ciprofloxacin   IVPB      ciprofloxacin   IVPB 400 milliGRAM(s) IV Intermittent every 12 hours  metroNIDAZOLE  IVPB      metroNIDAZOLE  IVPB 500 milliGRAM(s) IV Intermittent every 8 hours        Other:  atorvastatin 40 milliGRAM(s) Oral at bedtime  pantoprazole  Injectable 40 milliGRAM(s) IV Push two times a day  insulin lispro (HumaLOG) corrective regimen sliding scale   SubCutaneous every 6 hours  artificial  tears Solution 1 Drop(s) Both EYES two times a day  HYDROmorphone  Injectable 0.5 milliGRAM(s) IV Push every 4 hours PRN  simethicone 80 milliGRAM(s) Chew every 4 hours PRN    atorvastatin 40 milliGRAM(s) Oral at bedtime  pantoprazole  Injectable 40 milliGRAM(s) IV Push two times a day  insulin lispro (HumaLOG) corrective regimen sliding scale   SubCutaneous every 6 hours  ciprofloxacin   IVPB      ciprofloxacin   IVPB 400 milliGRAM(s) IV Intermittent every 12 hours  metroNIDAZOLE  IVPB      metroNIDAZOLE  IVPB 500 milliGRAM(s) IV Intermittent every 8 hours  artificial  tears Solution 1 Drop(s) Both EYES two times a day  HYDROmorphone  Injectable 0.5 milliGRAM(s) IV Push every 4 hours PRN  simethicone 80 milliGRAM(s) Chew every 4 hours PRN    Drug Dosing Weight    Weight (kg): 60 (11 Sep 2017 13:19)    CENTRAL LINE: [ ] YES [ ] NO  LOCATION:   DATE INSERTED:  REMOVE: [ ] YES [ ] NO  EXPLAIN:    [place]: [ ] YES [ ] NO    DATE INSERTED:  REMOVE:  [ ] YES [ ] NO  EXPLAIN:    A-LINE:  [ ] YES [ ] NO  LOCATION:   DATE INSERTED:  REMOVE:  [ ] YES [ ] NO  EXPLAIN:    PMH -reviewed admission note, no change since admission      ICU Vital Signs Last 24 Hrs  T(C): 36.7 (18 Sep 2017 05:00), Max: 37.3 (17 Sep 2017 19:37)  T(F): 98 (18 Sep 2017 05:00), Max: 99.2 (17 Sep 2017 19:37)  HR: 76 (18 Sep 2017 07:00) (64 - 89)  BP: 106/61 (18 Sep 2017 07:00) (106/61 - 143/68)  BP(mean): 71 (18 Sep 2017 07:00) (63 - 88)  ABP: --  ABP(mean): --  RR: 15 (18 Sep 2017 07:00) (11 - 26)  SpO2: 95% (18 Sep 2017 07:00) (95% - 100%)            09-17 @ 07:01  -  09-18 @ 07:00  --------------------------------------------------------  IN: 1360 mL / OUT: 2025 mL / NET: -665 mL            PHYSICAL EXAM:    CONSTITUTIONAL: No fever, weight loss, or fatigue  	EYES: No eye pain, visual disturbances, or discharge  	ENMT:  No difficulty hearing, tinnitus, vertigo; No sinus or throat pain  	NECK: No pain or stiffness  	RESPIRATORY: No cough, wheezing, chills or hemoptysis; No shortness of breath  	CARDIOVASCULAR: syncope, dizziness - no chest pain, no palpitations  	GASTROINTESTINAL: mild tenderness in suprapubic area   	GENITOURINARY: No dysuria, frequency, hematuria, or incontinence  	NEUROLOGICAL: No headaches, memory loss, loss of strength, numbness, or tremors  	SKIN: No itching, burning, rashes, or lesions   	LYMPH NODES: No enlarged glands  	ENDOCRINE: No heat or cold intolerance; No hair loss  	MUSCULOSKELETAL: No joint pain or swelling; No muscle, back, or extremity pain  	PSYCHIATRIC: No depression, anxiety, mood swings, or difficulty sleeping  	HEME/LYMPH: No easy bruising, or bleeding gums    ALLERY AND IMMUNOLOGIC: No hives or eczema        LABS:  CBC Full  -  ( 18 Sep 2017 04:41 )  WBC Count : 8.3 K/uL  Hemoglobin : 9.3 g/dL  Hematocrit : 26.5 %  Platelet Count - Automated : 265 K/uL  Mean Cell Volume : 88.9 fl  Mean Cell Hemoglobin : 31.3 pg  Mean Cell Hemoglobin Concentration : 35.3 gm/dL    09-18    141  |  108  |  13  ----------------------------<  125<H>  3.9   |  28  |  0.99    Ca    8.4      18 Sep 2017 04:41  Phos  3.2     09-18  Mg     2.0     09-18    TPro  5.5<L>  /  Alb  2.6<L>  /  TBili  0.5  /  DBili  x   /  AST  78<H>  /  ALT  74<H>  /  AlkPhos  60  09-18    PT/INR - ( 17 Sep 2017 14:35 )   PT: 16.6 sec;   INR: 1.51 ratio                 [x ]GOALS OF CARE DISCUSSION WITH PATIENT/FAMILY/PROXY:    CRITICAL CARE TIME SPENT: 35 minutes

## 2017-09-19 LAB
ALBUMIN SERPL ELPH-MCNC: 2.6 G/DL — LOW (ref 3.5–5)
ALP SERPL-CCNC: 59 U/L — SIGNIFICANT CHANGE UP (ref 40–120)
ALT FLD-CCNC: 75 U/L DA — HIGH (ref 10–60)
ANION GAP SERPL CALC-SCNC: 6 MMOL/L — SIGNIFICANT CHANGE UP (ref 5–17)
APTT BLD: 29.7 SEC — SIGNIFICANT CHANGE UP (ref 27.5–37.4)
AST SERPL-CCNC: 58 U/L — HIGH (ref 10–40)
BILIRUB SERPL-MCNC: 0.5 MG/DL — SIGNIFICANT CHANGE UP (ref 0.2–1.2)
BUN SERPL-MCNC: 14 MG/DL — SIGNIFICANT CHANGE UP (ref 7–18)
CALCIUM SERPL-MCNC: 8.3 MG/DL — LOW (ref 8.4–10.5)
CHLORIDE SERPL-SCNC: 108 MMOL/L — SIGNIFICANT CHANGE UP (ref 96–108)
CO2 SERPL-SCNC: 26 MMOL/L — SIGNIFICANT CHANGE UP (ref 22–31)
CREAT SERPL-MCNC: 0.96 MG/DL — SIGNIFICANT CHANGE UP (ref 0.5–1.3)
GLUCOSE SERPL-MCNC: 137 MG/DL — HIGH (ref 70–99)
HCT VFR BLD CALC: 26.9 % — LOW (ref 39–50)
HCT VFR BLD CALC: 27.5 % — LOW (ref 39–50)
HGB BLD-MCNC: 9.2 G/DL — LOW (ref 13–17)
HGB BLD-MCNC: 9.3 G/DL — LOW (ref 13–17)
INR BLD: 1.46 RATIO — HIGH (ref 0.88–1.16)
MAGNESIUM SERPL-MCNC: 2 MG/DL — SIGNIFICANT CHANGE UP (ref 1.6–2.6)
MCHC RBC-ENTMCNC: 29.5 PG — SIGNIFICANT CHANGE UP (ref 27–34)
MCHC RBC-ENTMCNC: 30 PG — SIGNIFICANT CHANGE UP (ref 27–34)
MCHC RBC-ENTMCNC: 33.8 GM/DL — SIGNIFICANT CHANGE UP (ref 32–36)
MCHC RBC-ENTMCNC: 34.2 GM/DL — SIGNIFICANT CHANGE UP (ref 32–36)
MCV RBC AUTO: 87.4 FL — SIGNIFICANT CHANGE UP (ref 80–100)
MCV RBC AUTO: 87.6 FL — SIGNIFICANT CHANGE UP (ref 80–100)
PHOSPHATE SERPL-MCNC: 2.9 MG/DL — SIGNIFICANT CHANGE UP (ref 2.5–4.5)
PLATELET # BLD AUTO: 303 K/UL — SIGNIFICANT CHANGE UP (ref 150–400)
PLATELET # BLD AUTO: 362 K/UL — SIGNIFICANT CHANGE UP (ref 150–400)
POTASSIUM SERPL-MCNC: 3.8 MMOL/L — SIGNIFICANT CHANGE UP (ref 3.5–5.3)
POTASSIUM SERPL-SCNC: 3.8 MMOL/L — SIGNIFICANT CHANGE UP (ref 3.5–5.3)
PROT SERPL-MCNC: 5.5 G/DL — LOW (ref 6–8.3)
PROTHROM AB SERPL-ACNC: 16 SEC — HIGH (ref 9.8–12.7)
RBC # BLD: 3.07 M/UL — LOW (ref 4.2–5.8)
RBC # BLD: 3.14 M/UL — LOW (ref 4.2–5.8)
RBC # FLD: 12.9 % — SIGNIFICANT CHANGE UP (ref 10.3–14.5)
RBC # FLD: 12.9 % — SIGNIFICANT CHANGE UP (ref 10.3–14.5)
SODIUM SERPL-SCNC: 140 MMOL/L — SIGNIFICANT CHANGE UP (ref 135–145)
WBC # BLD: 8.2 K/UL — SIGNIFICANT CHANGE UP (ref 3.8–10.5)
WBC # BLD: 9.5 K/UL — SIGNIFICANT CHANGE UP (ref 3.8–10.5)
WBC # FLD AUTO: 8.2 K/UL — SIGNIFICANT CHANGE UP (ref 3.8–10.5)
WBC # FLD AUTO: 9.5 K/UL — SIGNIFICANT CHANGE UP (ref 3.8–10.5)

## 2017-09-19 PROCEDURE — 74174 CTA ABD&PLVS W/CONTRAST: CPT | Mod: 26

## 2017-09-19 RX ORDER — ONDANSETRON 8 MG/1
4 TABLET, FILM COATED ORAL EVERY 6 HOURS
Qty: 0 | Refills: 0 | Status: COMPLETED | OUTPATIENT
Start: 2017-09-19 | End: 2017-09-19

## 2017-09-19 RX ADMIN — Medication 1 DROP(S): at 17:29

## 2017-09-19 RX ADMIN — Medication 100 MILLIGRAM(S): at 05:08

## 2017-09-19 RX ADMIN — Medication 100 MILLIGRAM(S): at 21:08

## 2017-09-19 RX ADMIN — Medication 1 DROP(S): at 05:08

## 2017-09-19 RX ADMIN — Medication 200 MILLIGRAM(S): at 05:07

## 2017-09-19 RX ADMIN — Medication 1: at 17:28

## 2017-09-19 RX ADMIN — Medication 100 MILLIGRAM(S): at 13:11

## 2017-09-19 RX ADMIN — HYDROMORPHONE HYDROCHLORIDE 0.5 MILLIGRAM(S): 2 INJECTION INTRAMUSCULAR; INTRAVENOUS; SUBCUTANEOUS at 08:50

## 2017-09-19 RX ADMIN — PANTOPRAZOLE SODIUM 40 MILLIGRAM(S): 20 TABLET, DELAYED RELEASE ORAL at 17:28

## 2017-09-19 RX ADMIN — ONDANSETRON 4 MILLIGRAM(S): 8 TABLET, FILM COATED ORAL at 11:15

## 2017-09-19 RX ADMIN — SODIUM CHLORIDE 50 MILLILITER(S): 9 INJECTION, SOLUTION INTRAVENOUS at 05:39

## 2017-09-19 RX ADMIN — ATORVASTATIN CALCIUM 40 MILLIGRAM(S): 80 TABLET, FILM COATED ORAL at 21:07

## 2017-09-19 RX ADMIN — HYDROMORPHONE HYDROCHLORIDE 0.5 MILLIGRAM(S): 2 INJECTION INTRAMUSCULAR; INTRAVENOUS; SUBCUTANEOUS at 07:54

## 2017-09-19 RX ADMIN — PANTOPRAZOLE SODIUM 40 MILLIGRAM(S): 20 TABLET, DELAYED RELEASE ORAL at 05:08

## 2017-09-19 RX ADMIN — Medication 200 MILLIGRAM(S): at 17:29

## 2017-09-19 NOTE — CHART NOTE - NSCHARTNOTEFT_GEN_A_CORE
71M from home, still works PMH of DM, HTN, Kidney stones, HLD who presents to hospital for syncope, possible seizure while on the way to work and episode of bloody stool incontinence.  Pt had 2 RRTs today for 2 episodes of bright red bloody bowel movement and hypotension to 80s, s/p 3 litre boluses and 1 unit pRBC being given. ICU accepted the pt as he is hemodynamically unstable sec to active Lower GI bleed.     1)  Hypovolemic shock 2/2 LGI bleed.     s/p code fusion. s/p total 8PRBC 4FFP  Lower GI bleed-possible diverticulosis bleed with CT scan questionable diverticulitis without relevant physical exam finding(LLQ tenderness). Also with hemorrhagic shock and symptomatic anemia from blood loss.  -monitor CBC  clear liquid  -GI and surgery consulted.  -No aspirin or anticoagulants or heparin/  Lovenox at this time.  -CTA -negative for source of bleed.  but concern for cecal diverticulitis.   -NGT with no evident of upper GI bleed. Bleeding scan negative.   -Metro and cipro while ruling out diverticulitis. day 9/10  -colonoscopy shows no bleeding site in the left colon, likely bleed could be in right colon.  -Currently the Hb is stable  -no role for IR angiography as bleeding scan and CTA are more sensitive and negative.     2) Diabetes.  Plan: HSS and accu chek.   A1C 7.0.     3) Need for prophylactic measure.  no chemical AC due to recent bleed.   on Protonix BID IV.      Discussed with ICU attending and Dr Garcia. Patient is medically stable to be downgrade to medicine floor. Patient is to get urgent CTA during active bleed if bleed again overnight and urgent bleeding scan if bleed daytime. Currently vitals and H/H stable and no active bleed for >48 hours. 71M from home, still works PMH of DM, HTN, Kidney stones, HLD who presents to hospital for syncope, possible seizure while on the way to work and episode of bloody stool incontinence.  Pt had 2 RRTs today for 2 episodes of bright red bloody bowel movement and hypotension to 80s, s/p 3 litre boluses and 1 unit pRBC being given. ICU accepted the pt as he is hemodynamically unstable sec to active Lower GI bleed.     1)  Hypovolemic shock 2/2 LGI bleed.     s/p code fusion. s/p total 8PRBC 4FFP  Lower GI bleed-possible diverticulosis bleed with CT scan questionable diverticulitis without relevant physical exam finding(LLQ tenderness). Also with hemorrhagic shock and symptomatic anemia from blood loss.  -monitor CBC  clear liquid  -GI and surgery consulted.  -No aspirin or anticoagulants or heparin/  Lovenox at this time.  -CTA -negative for source of bleed.  but concern for cecal diverticulitis.   -NGT with no evident of upper GI bleed. Bleeding scan negative.   -Metro and cipro while ruling out diverticulitis. day 9/10  -colonoscopy shows no bleeding site in the left colon, likely bleed could be in right colon.  -Currently the Hb is stable  -no role for IR angiography as bleeding scan and CTA are more sensitive and negative.     2) Diabetes.  Plan: HSS and accu chek.   A1C 7.0.     3) Need for prophylactic measure.  no chemical AC due to recent bleed.   on Protonix BID IV.    -Patient is to get urgent CTA during active bleed if bleed again overnight and urgent bleeding scan if bleed daytime. Currently vitals and H/H stable and no active bleed for >48 hours.  -cbc Q12 if H/H stable and no more active bleed.  -ABX will be completed on 9/20.     Discussed with ICU attending and Dr Garcia. Patient is medically stable to be downgrade to medicine floor. 71M from home, still works PMH of DM, HTN, Kidney stones, HLD who presents to hospital for syncope, possible seizure while on the way to work and episode of bloody stool incontinence.  Pt had 2 RRTs today for 2 episodes of bright red bloody bowel movement and hypotension to 80s, s/p 3 litre boluses and 1 unit pRBC being given. ICU accepted the pt as he is hemodynamically unstable sec to active Lower GI bleed.     1)  Hypovolemic shock 2/2 LGI bleed.     s/p code fusion. s/p total 8PRBC 4FFP  Lower GI bleed-possible diverticulosis bleed with CT scan questionable diverticulitis without relevant physical exam finding(LLQ tenderness). Also with hemorrhagic shock and symptomatic anemia from blood loss.  -monitor CBC  clear liquid  -GI and surgery consulted.  -No aspirin or anticoagulants or heparin/  Lovenox at this time.  -CTA -negative for source of bleed.  but concern for cecal diverticulitis.   -NGT with no evident of upper GI bleed. Bleeding scan negative.   -Metro and cipro while ruling out diverticulitis. completed.  -colonoscopy shows no bleeding site in the left colon, likely bleed could be in right colon.  -Currently the Hb is stable  -no role for IR angiography as bleeding scan and CTA are more sensitive and negative.     2) Diabetes.  Plan: HSS and accu chek.   A1C 7.0.     3) Need for prophylactic measure.  no chemical AC due to recent bleed.   on Protonix BID IV.      -cbc Q12 if H/H stable.       Discussed with ICU attending and Dr Garcia. Patient is medically stable to be downgrade to medicine floor.

## 2017-09-19 NOTE — PROGRESS NOTE ADULT - ASSESSMENT
1. GI bleeding (stopped)  2. Diverticulitis  3. Anemia(H/H stable)    Suggestions:    1. Monitor H/H closely  2. Transfuse PRBC as needed  3. Continue Protonix  4. Advance diet as tolerated  5. DVT prophylaxis  6. Avoid NSAID's

## 2017-09-19 NOTE — PROGRESS NOTE ADULT - SUBJECTIVE AND OBJECTIVE BOX
INTERVAL HPI OVERNIGHT EVENTS: No major event overnight.       Antimicrobial:  ciprofloxacin   IVPB      ciprofloxacin   IVPB 400 milliGRAM(s) IV Intermittent every 12 hours  metroNIDAZOLE  IVPB      metroNIDAZOLE  IVPB 500 milliGRAM(s) IV Intermittent every 8 hours    Cardiovascular:    Pulmonary:    Hematalogic:    Other:  atorvastatin 40 milliGRAM(s) Oral at bedtime  pantoprazole  Injectable 40 milliGRAM(s) IV Push two times a day  insulin lispro (HumaLOG) corrective regimen sliding scale   SubCutaneous every 6 hours  artificial  tears Solution 1 Drop(s) Both EYES two times a day  HYDROmorphone  Injectable 0.5 milliGRAM(s) IV Push every 4 hours PRN  simethicone 80 milliGRAM(s) Chew every 4 hours PRN  dextrose 5% + sodium chloride 0.9%. 1000 milliLiter(s) IV Continuous <Continuous>    atorvastatin 40 milliGRAM(s) Oral at bedtime  pantoprazole  Injectable 40 milliGRAM(s) IV Push two times a day  insulin lispro (HumaLOG) corrective regimen sliding scale   SubCutaneous every 6 hours  ciprofloxacin   IVPB      ciprofloxacin   IVPB 400 milliGRAM(s) IV Intermittent every 12 hours  metroNIDAZOLE  IVPB      metroNIDAZOLE  IVPB 500 milliGRAM(s) IV Intermittent every 8 hours  artificial  tears Solution 1 Drop(s) Both EYES two times a day  HYDROmorphone  Injectable 0.5 milliGRAM(s) IV Push every 4 hours PRN  simethicone 80 milliGRAM(s) Chew every 4 hours PRN  dextrose 5% + sodium chloride 0.9%. 1000 milliLiter(s) IV Continuous <Continuous>    Drug Dosing Weight    Weight (kg): 60 (11 Sep 2017 13:19)    CENTRAL LINE: [ ] YES [x ] NO  LOCATION:   DATE INSERTED:  REMOVE: [ ] YES [ ] NO  EXPLAIN:    [place]: [ ] YES x[ x] NO    DATE INSERTED:  REMOVE:  [ ] YES [ ] NO  EXPLAIN:    A-LINE:  [ ] YES [x ] NO  LOCATION:   DATE INSERTED:  REMOVE:  [ ] YES [ ] NO  EXPLAIN:    PMH -reviewed admission note, no change since admission    ICU Vital Signs Last 24 Hrs  T(C): 36.3 (19 Sep 2017 05:35), Max: 37.3 (18 Sep 2017 12:00)  T(F): 97.3 (19 Sep 2017 05:35), Max: 99.1 (18 Sep 2017 12:00)  HR: 76 (19 Sep 2017 07:00) (62 - 79)  BP: 108/66 (19 Sep 2017 07:00) (95/54 - 130/64)  BP(mean): 77 (19 Sep 2017 07:00) (63 - 83)  ABP: --  ABP(mean): --  RR: 15 (19 Sep 2017 07:00) (11 - 20)  SpO2: 98% (19 Sep 2017 07:00) (96% - 100%)            09-18 @ 07:01  -  09-19 @ 07:00  --------------------------------------------------------  IN: 1850 mL / OUT: 2250 mL / NET: -400 mL          PHYSICAL EXAM:    CONSTITUTIONAL: No fever, weight loss, or fatigue  	EYES: No eye pain, visual disturbances, or discharge  	ENMT:  No difficulty hearing, tinnitus, vertigo; No sinus or throat pain  	NECK: No pain or stiffness  	RESPIRATORY: No cough, wheezing, chills or hemoptysis; No shortness of breath  	CARDIOVASCULAR: syncope, dizziness - no chest pain, no palpitations  	GASTROINTESTINAL: mild tenderness in suprapubic area   	GENITOURINARY: No dysuria, frequency, hematuria, or incontinence  	NEUROLOGICAL: No headaches, memory loss, loss of strength, numbness, or tremors  	SKIN: No itching, burning, rashes, or lesions   	LYMPH NODES: No enlarged glands  	ENDOCRINE: No heat or cold intolerance; No hair loss  	MUSCULOSKELETAL: No joint pain or swelling; No muscle, back, or extremity pain  	PSYCHIATRIC: No depression, anxiety, mood swings, or difficulty sleeping  	HEME/LYMPH: No easy bruising, or bleeding gums    ALLERY AND IMMUNOLOGIC: No hives or eczema    LABS:  CBC Full  -  ( 19 Sep 2017 06:33 )  WBC Count : 8.2 K/uL  Hemoglobin : 9.2 g/dL  Hematocrit : 26.9 %  Platelet Count - Automated : 303 K/uL  Mean Cell Volume : 87.6 fl  Mean Cell Hemoglobin : 30.0 pg  Mean Cell Hemoglobin Concentration : 34.2 gm/dL      09-19    140  |  108  |  14  ----------------------------<  137<H>  3.8   |  26  |  0.96    Ca    8.3<L>      19 Sep 2017 06:33  Phos  2.9     09-19  Mg     2.0     09-19    TPro  5.5<L>  /  Alb  2.6<L>  /  TBili  0.5  /  DBili  x   /  AST  58<H>  /  ALT  75<H>  /  AlkPhos  59  09-19    PT/INR - ( 17 Sep 2017 14:35 )   PT: 16.6 sec;   INR: 1.51 ratio             [x ]GOALS OF CARE DISCUSSION WITH PATIENT/FAMILY/PROXY:    CRITICAL CARE TIME SPENT: 35 minutes

## 2017-09-19 NOTE — PROGRESS NOTE ADULT - SUBJECTIVE AND OBJECTIVE BOX
CHIEF COMPLAINT:Patient is a 71y old  Male who presents with a chief complaint of Passing out, fecal incontinence, blood in stool (09 Sep 2017 17:45)    	          REVIEW OF SYSTEMS:  CONSTITUTIONAL: No fever, weight loss, or fatigue  EYES: No eye pain, visual disturbances, or discharge  NECK: No pain or stiffness  RESPIRATORY: No cough, wheezing, chills or hemoptysis; No Shortness of Breath  CARDIOVASCULAR: No chest pain, palpitations, passing out, dizziness, or leg swelling  GASTROINTESTINAL: No abdominal or epigastric pain. No nausea, vomiting, or hematemesis; No diarrhea or constipation. No melena or hematochezia.  GENITOURINARY: No dysuria, frequency, hematuria, or incontinence  NEUROLOGICAL: No headaches, memory loss, loss of strength, numbness, or tremors  SKIN: No itching, burning, rashes, or lesions   LYMPH Nodes: No enlarged glands  ENDOCRINE: No heat or cold intolerance; No hair loss  MUSCULOSKELETAL: No joint pain or swelling; No muscle, back, or extremity pain    Medications:  MEDICATIONS  (STANDING):  atorvastatin 40 milliGRAM(s) Oral at bedtime  pantoprazole  Injectable 40 milliGRAM(s) IV Push two times a day  insulin lispro (HumaLOG) corrective regimen sliding scale   SubCutaneous every 6 hours  ciprofloxacin   IVPB      ciprofloxacin   IVPB 400 milliGRAM(s) IV Intermittent every 12 hours  metroNIDAZOLE  IVPB      metroNIDAZOLE  IVPB 500 milliGRAM(s) IV Intermittent every 8 hours  artificial  tears Solution 1 Drop(s) Both EYES two times a day    MEDICATIONS  (PRN):  HYDROmorphone  Injectable 0.5 milliGRAM(s) IV Push every 4 hours PRN Moderate Pain (4 - 6)  simethicone 80 milliGRAM(s) Chew every 4 hours PRN Gas,indigestion    	    PHYSICAL EXAM:  T(C): 36.3 (09-19-17 @ 05:35), Max: 37.3 (09-18-17 @ 12:00)  HR: 61 (09-19-17 @ 11:00) (61 - 79)  BP: 99/58 (09-19-17 @ 11:00) (95/54 - 130/64)  RR: 14 (09-19-17 @ 11:00) (9 - 20)  SpO2: 97% (09-19-17 @ 11:00) (96% - 100%)  Wt(kg): --  I&O's Summary    18 Sep 2017 07:01  -  19 Sep 2017 07:00  --------------------------------------------------------  IN: 1850 mL / OUT: 2250 mL / NET: -400 mL    19 Sep 2017 07:01  -  19 Sep 2017 11:30  --------------------------------------------------------  IN: 50 mL / OUT: 0 mL / NET: 50 mL        Appearance: Normal	  HEENT:   Normal oral mucosa, PERRL, EOMI	  Lymphatic: No lymphadenopathy  Cardiovascular: Normal S1 S2, No JVD, No murmurs, No edema  Respiratory: Lungs clear to auscultation	  Psychiatry: A & O x 3, Mood & affect appropriate  Gastrointestinal:  Soft, Non-tender, + BS	  Skin: No rashes, No ecchymoses, No cyanosis	  Neurologic: Non-focal  Extremities: Normal range of motion, No clubbing, cyanosis or edema  Vascular: Peripheral pulses palpable 2+ bilaterally    TELEMETRY: 	    ECG:  	  RADIOLOGY:  OTHER: 	  	  LABS:	 	    CARDIAC MARKERS:                                9.2    8.2   )-----------( 303      ( 19 Sep 2017 06:33 )             26.9     09-19    140  |  108  |  14  ----------------------------<  137<H>  3.8   |  26  |  0.96    Ca    8.3<L>      19 Sep 2017 06:33  Phos  2.9     09-19  Mg     2.0     09-19    TPro  5.5<L>  /  Alb  2.6<L>  /  TBili  0.5  /  DBili  x   /  AST  58<H>  /  ALT  75<H>  /  AlkPhos  59  09-19    proBNP:   Lipid Profile:   HgA1c:   TSH:

## 2017-09-19 NOTE — PROGRESS NOTE ADULT - ASSESSMENT
71M from home, still works PMH of DM, HTN, Kidney stones, HLD who presents to hospital for syncope, possible seizure while on the way to work and episode of bloody stool incontinence.  Pt had 2 RRTs today for 2 episodes of bright red bloody bowel movement and hypotension to 80s, s/p 3 litre boluses and 1 unit pRBC being given. ICU accepted the pt as he is hemodynamically unstable sec to active Lower GI bleed.     1)  Hypovolemic shock 2/2 LGI bleed.     s/p code fusion. s/p total 8PRBC 4FFP  Lower GI bleed-possible diverticulosis bleed with CT scan questionable diverticulitis without relevant physical exam finding(LLQ tenderness). Also with hemorrhagic shock and symptomatic anemia from blood loss.  -monitor CBC  clear liquid  -GI and surgery consulted.  -No aspirin or anticoagulants or heparin/  Lovenox at this time.  -CTA -negative for source of bleed.  but concern for cecal diverticulitis.   -NGT with no evident of upper GI bleed. Bleeding scan negative.   -Metro and cipro while ruling out diverticulitis. day 9/10  -colonoscopy shows no bleeding site in the left colon, likely bleed could be in right colon.  -Currently the Hb is stable  -no role for IR angiography as bleeding scan and CTA are more sensitive and negative.     2) Diabetes.  Plan: HSS and accu chek.   A1C 7.0.     3) Need for prophylactic measure.  no chemical AC due to recent bleed.   on Protonix BID IV.

## 2017-09-19 NOTE — PROGRESS NOTE ADULT - SUBJECTIVE AND OBJECTIVE BOX
[ X  ] ICU                   [   ] CCU                  [   ] Medical Floor      Patient remains in ICU for monitoring. He is comfortable. No new complaints reported, No abdominal pain, N/V, hematemesis, hematochezia, melena, fever, chills, chest pain, SOB, cough or diarrhea reported.      VITALS  ICU Vital Signs Last 24 Hrs  T(C): 36.8 (19 Sep 2017 15:41), Max: 37.2 (18 Sep 2017 20:00)  T(F): 98.3 (19 Sep 2017 15:41), Max: 98.9 (18 Sep 2017 20:00)  HR: 81 (19 Sep 2017 19:00) (61 - 107)  BP: 114/67 (19 Sep 2017 19:00) (87/46 - 137/71)  BP(mean): 78 (19 Sep 2017 19:00) (56 - 86)   RR: 18 (19 Sep 2017 19:00) (9 - 19)  SpO2: 98% (19 Sep 2017 19:00) (96% - 100%)         MEDICATIONS  (STANDING):  atorvastatin 40 milliGRAM(s) Oral at bedtime  pantoprazole  Injectable 40 milliGRAM(s) IV Push two times a day  insulin lispro (HumaLOG) corrective regimen sliding scale   SubCutaneous every 6 hours  ciprofloxacin   IVPB      ciprofloxacin   IVPB 400 milliGRAM(s) IV Intermittent every 12 hours  metroNIDAZOLE  IVPB      metroNIDAZOLE  IVPB 500 milliGRAM(s) IV Intermittent every 8 hours  artificial  tears Solution 1 Drop(s) Both EYES two times a day    MEDICATIONS  (PRN):  HYDROmorphone  Injectable 0.5 milliGRAM(s) IV Push every 4 hours PRN Moderate Pain (4 - 6)  simethicone 80 milliGRAM(s) Chew every 4 hours PRN Gas,indigestion                            9.3    9.5   )-----------( 362      ( 19 Sep 2017 17:24 )             27.5       09-19    140  |  108  |  14  ----------------------------<  137<H>  3.8   |  26  |  0.96    Ca    8.3<L>      19 Sep 2017 06:33  Phos  2.9     09-19  Mg     2.0     09-19    TPro  5.5<L>  /  Alb  2.6<L>  /  TBili  0.5  /  DBili  x   /  AST  58<H>  /  ALT  75<H>  /  AlkPhos  59  09-19      PT/INR - ( 19 Sep 2017 17:24 )   PT: 16.0 sec;   INR: 1.46 ratio         PTT - ( 19 Sep 2017 17:24 )  PTT:29.7 sec

## 2017-09-19 NOTE — PROGRESS NOTE ADULT - ASSESSMENT
seen and examined   no bleed    icu called me pt to be downgraded to floor. pt awake non focal  declines any abd pain , no nausea or rectal bleed.  only when he wants to sit little dizzy.  As per GI no colonoscopy repeat at this time IR declined for any intervention as no clear cut point as bleeding scan was negative.  all consltant will stay on board, when pt is on floor.  watch hgb.  dm , htn  cont same.

## 2017-09-20 LAB
ALBUMIN SERPL ELPH-MCNC: 2.7 G/DL — LOW (ref 3.5–5)
ALP SERPL-CCNC: 61 U/L — SIGNIFICANT CHANGE UP (ref 40–120)
ALT FLD-CCNC: 70 U/L DA — HIGH (ref 10–60)
ANION GAP SERPL CALC-SCNC: 7 MMOL/L — SIGNIFICANT CHANGE UP (ref 5–17)
AST SERPL-CCNC: 41 U/L — HIGH (ref 10–40)
BILIRUB SERPL-MCNC: 0.5 MG/DL — SIGNIFICANT CHANGE UP (ref 0.2–1.2)
BUN SERPL-MCNC: 16 MG/DL — SIGNIFICANT CHANGE UP (ref 7–18)
CALCIUM SERPL-MCNC: 8.6 MG/DL — SIGNIFICANT CHANGE UP (ref 8.4–10.5)
CHLORIDE SERPL-SCNC: 107 MMOL/L — SIGNIFICANT CHANGE UP (ref 96–108)
CO2 SERPL-SCNC: 25 MMOL/L — SIGNIFICANT CHANGE UP (ref 22–31)
CREAT SERPL-MCNC: 1.02 MG/DL — SIGNIFICANT CHANGE UP (ref 0.5–1.3)
GLUCOSE SERPL-MCNC: 134 MG/DL — HIGH (ref 70–99)
HCT VFR BLD CALC: 25.8 % — LOW (ref 39–50)
HCT VFR BLD CALC: 27.4 % — LOW (ref 39–50)
HCT VFR BLD CALC: 27.5 % — LOW (ref 39–50)
HCT VFR BLD CALC: 27.8 % — LOW (ref 39–50)
HGB BLD-MCNC: 8.9 G/DL — LOW (ref 13–17)
HGB BLD-MCNC: 9.4 G/DL — LOW (ref 13–17)
HGB BLD-MCNC: 9.5 G/DL — LOW (ref 13–17)
HGB BLD-MCNC: 9.7 G/DL — LOW (ref 13–17)
MAGNESIUM SERPL-MCNC: 1.9 MG/DL — SIGNIFICANT CHANGE UP (ref 1.6–2.6)
MCHC RBC-ENTMCNC: 30 PG — SIGNIFICANT CHANGE UP (ref 27–34)
MCHC RBC-ENTMCNC: 30.1 PG — SIGNIFICANT CHANGE UP (ref 27–34)
MCHC RBC-ENTMCNC: 30.6 PG — SIGNIFICANT CHANGE UP (ref 27–34)
MCHC RBC-ENTMCNC: 31.2 PG — SIGNIFICANT CHANGE UP (ref 27–34)
MCHC RBC-ENTMCNC: 34.3 GM/DL — SIGNIFICANT CHANGE UP (ref 32–36)
MCHC RBC-ENTMCNC: 34.3 GM/DL — SIGNIFICANT CHANGE UP (ref 32–36)
MCHC RBC-ENTMCNC: 34.7 GM/DL — SIGNIFICANT CHANGE UP (ref 32–36)
MCHC RBC-ENTMCNC: 34.7 GM/DL — SIGNIFICANT CHANGE UP (ref 32–36)
MCV RBC AUTO: 86.7 FL — SIGNIFICANT CHANGE UP (ref 80–100)
MCV RBC AUTO: 87.5 FL — SIGNIFICANT CHANGE UP (ref 80–100)
MCV RBC AUTO: 89 FL — SIGNIFICANT CHANGE UP (ref 80–100)
MCV RBC AUTO: 89.7 FL — SIGNIFICANT CHANGE UP (ref 80–100)
PHOSPHATE SERPL-MCNC: 3.6 MG/DL — SIGNIFICANT CHANGE UP (ref 2.5–4.5)
PLATELET # BLD AUTO: 336 K/UL — SIGNIFICANT CHANGE UP (ref 150–400)
PLATELET # BLD AUTO: 341 K/UL — SIGNIFICANT CHANGE UP (ref 150–400)
PLATELET # BLD AUTO: 345 K/UL — SIGNIFICANT CHANGE UP (ref 150–400)
PLATELET # BLD AUTO: 348 K/UL — SIGNIFICANT CHANGE UP (ref 150–400)
POTASSIUM SERPL-MCNC: 4 MMOL/L — SIGNIFICANT CHANGE UP (ref 3.5–5.3)
POTASSIUM SERPL-SCNC: 4 MMOL/L — SIGNIFICANT CHANGE UP (ref 3.5–5.3)
PROT SERPL-MCNC: 5.8 G/DL — LOW (ref 6–8.3)
RBC # BLD: 2.9 M/UL — LOW (ref 4.2–5.8)
RBC # BLD: 3.1 M/UL — LOW (ref 4.2–5.8)
RBC # BLD: 3.14 M/UL — LOW (ref 4.2–5.8)
RBC # BLD: 3.15 M/UL — LOW (ref 4.2–5.8)
RBC # FLD: 12.7 % — SIGNIFICANT CHANGE UP (ref 10.3–14.5)
RBC # FLD: 12.9 % — SIGNIFICANT CHANGE UP (ref 10.3–14.5)
RBC # FLD: 13.2 % — SIGNIFICANT CHANGE UP (ref 10.3–14.5)
RBC # FLD: 13.3 % — SIGNIFICANT CHANGE UP (ref 10.3–14.5)
SODIUM SERPL-SCNC: 139 MMOL/L — SIGNIFICANT CHANGE UP (ref 135–145)
WBC # BLD: 10.4 K/UL — SIGNIFICANT CHANGE UP (ref 3.8–10.5)
WBC # BLD: 8.1 K/UL — SIGNIFICANT CHANGE UP (ref 3.8–10.5)
WBC # BLD: 8.1 K/UL — SIGNIFICANT CHANGE UP (ref 3.8–10.5)
WBC # BLD: 9.7 K/UL — SIGNIFICANT CHANGE UP (ref 3.8–10.5)
WBC # FLD AUTO: 10.4 K/UL — SIGNIFICANT CHANGE UP (ref 3.8–10.5)
WBC # FLD AUTO: 8.1 K/UL — SIGNIFICANT CHANGE UP (ref 3.8–10.5)
WBC # FLD AUTO: 8.1 K/UL — SIGNIFICANT CHANGE UP (ref 3.8–10.5)
WBC # FLD AUTO: 9.7 K/UL — SIGNIFICANT CHANGE UP (ref 3.8–10.5)

## 2017-09-20 RX ORDER — SODIUM CHLORIDE 9 MG/ML
1000 INJECTION INTRAMUSCULAR; INTRAVENOUS; SUBCUTANEOUS
Qty: 0 | Refills: 0 | Status: DISCONTINUED | OUTPATIENT
Start: 2017-09-20 | End: 2017-09-21

## 2017-09-20 RX ORDER — ACETAMINOPHEN 500 MG
650 TABLET ORAL EVERY 6 HOURS
Qty: 0 | Refills: 0 | Status: DISCONTINUED | OUTPATIENT
Start: 2017-09-20 | End: 2017-09-25

## 2017-09-20 RX ORDER — HYDROMORPHONE HYDROCHLORIDE 2 MG/ML
0.5 INJECTION INTRAMUSCULAR; INTRAVENOUS; SUBCUTANEOUS ONCE
Qty: 0 | Refills: 0 | Status: DISCONTINUED | OUTPATIENT
Start: 2017-09-20 | End: 2017-09-20

## 2017-09-20 RX ADMIN — Medication 1 DROP(S): at 17:14

## 2017-09-20 RX ADMIN — SODIUM CHLORIDE 100 MILLILITER(S): 9 INJECTION INTRAMUSCULAR; INTRAVENOUS; SUBCUTANEOUS at 04:38

## 2017-09-20 RX ADMIN — PANTOPRAZOLE SODIUM 40 MILLIGRAM(S): 20 TABLET, DELAYED RELEASE ORAL at 17:14

## 2017-09-20 RX ADMIN — ATORVASTATIN CALCIUM 40 MILLIGRAM(S): 80 TABLET, FILM COATED ORAL at 21:19

## 2017-09-20 RX ADMIN — HYDROMORPHONE HYDROCHLORIDE 0.5 MILLIGRAM(S): 2 INJECTION INTRAMUSCULAR; INTRAVENOUS; SUBCUTANEOUS at 00:05

## 2017-09-20 RX ADMIN — HYDROMORPHONE HYDROCHLORIDE 0.5 MILLIGRAM(S): 2 INJECTION INTRAMUSCULAR; INTRAVENOUS; SUBCUTANEOUS at 06:41

## 2017-09-20 RX ADMIN — HYDROMORPHONE HYDROCHLORIDE 0.5 MILLIGRAM(S): 2 INJECTION INTRAMUSCULAR; INTRAVENOUS; SUBCUTANEOUS at 01:37

## 2017-09-20 RX ADMIN — Medication 1 DROP(S): at 05:39

## 2017-09-20 RX ADMIN — Medication 650 MILLIGRAM(S): at 21:18

## 2017-09-20 RX ADMIN — HYDROMORPHONE HYDROCHLORIDE 0.5 MILLIGRAM(S): 2 INJECTION INTRAMUSCULAR; INTRAVENOUS; SUBCUTANEOUS at 02:00

## 2017-09-20 RX ADMIN — Medication 100 MILLIGRAM(S): at 05:41

## 2017-09-20 RX ADMIN — HYDROMORPHONE HYDROCHLORIDE 0.5 MILLIGRAM(S): 2 INJECTION INTRAMUSCULAR; INTRAVENOUS; SUBCUTANEOUS at 01:30

## 2017-09-20 RX ADMIN — HYDROMORPHONE HYDROCHLORIDE 0.5 MILLIGRAM(S): 2 INJECTION INTRAMUSCULAR; INTRAVENOUS; SUBCUTANEOUS at 06:03

## 2017-09-20 RX ADMIN — Medication 650 MILLIGRAM(S): at 20:21

## 2017-09-20 RX ADMIN — PANTOPRAZOLE SODIUM 40 MILLIGRAM(S): 20 TABLET, DELAYED RELEASE ORAL at 05:40

## 2017-09-20 RX ADMIN — Medication 200 MILLIGRAM(S): at 05:40

## 2017-09-20 RX ADMIN — Medication 1: at 11:44

## 2017-09-20 NOTE — PROGRESS NOTE ADULT - ASSESSMENT
1. GI bleeding (stopped)  2. Anemia (H/H stable)  3. Diverticulitis    Suggestions:    1. Monitor H/H  2. Protonix daily  3. Continue antibiotics  4. Avoid NSAID's  5. DVT prophylaxis

## 2017-09-20 NOTE — PROGRESS NOTE ADULT - ATTENDING COMMENTS
71 male with DM,  HTN, HLD, admitted with massive GI bleed, likely diverticular, anemia, transferred to ICU with hemorrhagic shock requiring pressors/massive transfusion.  CTA abd/pelvis unable to localize source of bleeding.  Also had negative bleeding scan.  Colonoscopy showed right sided diverticula, old blood.  Had another bloody bowel movement yesterday but Hb stable.  Will discuss plan with surgery and interventional radiology.
71 male with DM,  HTN, HLD, admitted with massive GI bleed, likely diverticular, anemia, transferred to ICU with hemorrhagic shock requiring pressors/massive transfusion.  CTA abd/pelvis unable to localize source of bleeding.  Also had negative bleeding scan.  Colonoscopy showed right sided diverticula, old blood.  If he bleeds again then will likely need partial colectomy however no further bleeding so far.  Can advance diet to full liquid, transfer out of ICU, d/c central line.
71 male with DM,  HTN, HLD, admitted with massive GI bleed, likely diverticular, anemia, transferred to ICU with hemorrhagic shock requiring pressors/massive transfusion.  CTA abd/pelvis unable to localize source of bleeding.  Also had negative bleeding scan.  Colonoscopy showed right sided diverticula, old blood.  No further drop in Hb.  Can advance to full liquid diet.
71 male with DM,  HTN, HLD, admitted with massive GI bleed, likely diverticular, anemia, transferred to ICU with hemorrhagic shock requiring pressors/massive transfusion.  CTA abd/pelvis unable to localize source of bleeding.  Also had negative bleeding scan.  Colonoscopy showed right sided diverticula, old blood.  If he bleeds again then will likely need partial colectomy.
71M from home, still works PMH of DM, HTN, Kidney stones, HLD who presents to hospital for syncope, possible seizure while on the way to work and episode of bloody stool incontinence.  Pt had 2 RRTs today for 2 episodes of bright red bloody bowel movement and hypotension to 80s, s/p 3 litre boluses and 1 unit pRBC being given. ICU accepted the pt as he is hemodynamically unstable sec to active Lower GI bleed.     1)  Hypovolemic shock 2/2 LGI bleed.     s/p code fusion. s/p total 8PRBC 4FFP  Lower GI bleed-possible diverticulosis bleed with CT scan questionable diverticulitis without relevant physical exam finding(LLQ tenderness). Also with hemorrhagic shock and symptomatic anemia from blood loss.  -monitor CBC  clear liquid  -GI and surgery consulted.  -No aspirin or anticoagulants or heparin/  Lovenox at this time.  -CTA -negative for source of bleed.  but concern for cecal diverticulitis.   -NGT with no evident of upper GI bleed. Bleeding scan negative.   -Metro and cipro while ruling out diverticulitis. day 9/10  -colonoscopy shows no bleeding site in the left colon, likely bleed could be in right colon.  -Currently the Hb is stable  -no role for IR angiography as bleeding scan and CTA are more sensitive and negative.   Patient still has recurrent GI bleed.    2) Diabetes.  Plan: HSS and accu chek.   A1C 7.0.     3) Need for prophylactic measure.  no chemical AC due to recent bleed.   on Protonix BID IV.
As above  No further bleeding today  HCT stable at 28 all day thus far  VSS    For colonoscopy in am
I was physically present for the key portions of the evaluation and management (E/M) service provided.  The patient was personally seen and examined at bedside.  I reviewed the resident's  H& P , ROS,  Exam, assessment and plan. VS and labs  were personally reviewed.   I agree with  the all of the above with the following additions:    Summary:  71 year old male with significant lower Gi bleed. Patient required 8 units of PRBC. Two evening ago with a further 2 unit drop  in H/H. Patient remains HD stable. Given his ongoing bleed with no clear source will attempt Colonoscopy with minimal air insufflation. Surgical follow up appreciated. High risk consent will be taken (increased risk of perforation)     Thank you for your consultation and allowing  me to participate in the care of your patients. If you have further questions please contact me at [phone].
71 male with DM,  HTN, HLD, admitted with massive GI bleed, likely lower (diverticular?), anemia transferred to ICU with hemorrhagic shock requiring pressors.  CTA abd/pelvis unable to localize source of bleeding.  Awaiting colonoscopy.  Continue abx for diverticulitis.  Appreciate surgery and GI input. Total CC time 35 min.
71 male with DM,  HTN, HLD, admitted with massive GI bleed, likely lower (diverticular?), anemia transferred to ICU with hemorrhagic shock requiring pressors.  CTA abd/pelvis unable to localize source of bleeding. Bleeding seems to have stopped, anemia stable. GI deferring colonoscopy for now.  Continue abx for diverticulitis.  Appreciate surgery and GI input.
71 male with DM,  HTN, HLD, admitted with massive GI bleed, likely lower (diverticular?), anemia. now with active bleeding, hemorrhagic shock requiring pressors.  Will transfuse blood, FFP.  Give TXA 1 gram.  Recheck labs.  When stable will need CTA abd/pelvis.
71 male with DM,  HTN, HLD, admitted with massive GI bleed, likely lower (diverticular?), anemia transferred to ICU with hemorrhagic shock requiring pressors.  CTA abd/pelvis unable to localize source of bleeding.    Had another episode of bleeding last night, required additional PRBC transfusion.  Will obtain bleeding scan to try to localize bleed.  See if GI willing to consider colonoscopy.
71 male with DM,  HTN, HLD, admitted with massive GI bleed, likely lower (diverticular?), anemia transferred to ICU with hemorrhagic shock requiring pressors.  CTA abd/pelvis unable to localize source of bleeding.  Also had negative bleeding scan.   Possible colonoscopy today, already prepped.  May eventually need partial colectomy.

## 2017-09-20 NOTE — PROGRESS NOTE ADULT - SUBJECTIVE AND OBJECTIVE BOX
CHIEF COMPLAINT:Patient is a 71y old  Male who presents with a chief complaint of Passing out, fecal incontinence, blood in stool (09 Sep 2017 17:45)  Npt had bleed again  little bit  no abd pain, no nausea or vomiting    	          REVIEW OF SYSTEMS:  CONSTITUTIONAL: No fever, weight loss, or fatigue  EYES: No eye pain, visual disturbances, or discharge  NECK: No pain or stiffness  RESPIRATORY: No cough, wheezing, chills or hemoptysis; No Shortness of Breath  CARDIOVASCULAR: No chest pain, palpitations, passing out, dizziness, or leg swelling  GASTROINTESTINAL: No abdominal or epigastric pain. No nausea, vomiting, or hematemesis; No diarrhea or constipation. No melena or hematochezia.  GENITOURINARY: No dysuria, frequency, hematuria, or incontinence  NEUROLOGICAL: No headaches, memory loss, loss of strength, numbness, or tremors  SKIN: No itching, burning, rashes, or lesions   LYMPH Nodes: No enlarged glands  ENDOCRINE: No heat or cold intolerance; No hair loss  MUSCULOSKELETAL: No joint pain or swelling; No muscle, back, or extremity pain    Medications:  MEDICATIONS  (STANDING):  atorvastatin 40 milliGRAM(s) Oral at bedtime  pantoprazole  Injectable 40 milliGRAM(s) IV Push two times a day  insulin lispro (HumaLOG) corrective regimen sliding scale   SubCutaneous every 6 hours  artificial  tears Solution 1 Drop(s) Both EYES two times a day  sodium chloride 0.9%. 1000 milliLiter(s) (100 mL/Hr) IV Continuous <Continuous>    MEDICATIONS  (PRN):  HYDROmorphone  Injectable 0.5 milliGRAM(s) IV Push every 4 hours PRN Moderate Pain (4 - 6)  simethicone 80 milliGRAM(s) Chew every 4 hours PRN Gas,indigestion    	    PHYSICAL EXAM:  T(C): 36.1 (09-20-17 @ 08:00), Max: 37.2 (09-19-17 @ 20:00)  HR: 71 (09-20-17 @ 11:00) (63 - 99)  BP: 120/73 (09-20-17 @ 11:00) (87/46 - 123/66)  RR: 13 (09-20-17 @ 11:00) (9 - 20)  SpO2: 100% (09-20-17 @ 11:00) (96% - 100%)  Wt(kg): --  I&O's Summary    19 Sep 2017 07:01  -  20 Sep 2017 07:00  --------------------------------------------------------  IN: 1430 mL / OUT: 925 mL / NET: 505 mL        Appearance: Normal	  HEENT:   Normal oral mucosa, PERRL, EOMI	  Lymphatic: No lymphadenopathy  Cardiovascular: Normal S1 S2, No JVD, No murmurs, No edema  Respiratory: Lungs clear to auscultation	  Psychiatry: A & O x 3, Mood & affect appropriate  Gastrointestinal:  Soft, Non-tender, + BS	  Skin: No rashes, No ecchymoses, No cyanosis	  Neurologic: Non-focal  Extremities: Normal range of motion, No clubbing, cyanosis or edema  Vascular: Peripheral pulses palpable 2+ bilaterally    TELEMETRY: 	    ECG:  	  RADIOLOGY:  OTHER: 	  	  LABS:	 	    CARDIAC MARKERS:                                9.4    10.4  )-----------( 341      ( 20 Sep 2017 03:59 )             27.5     09-20    139  |  107  |  16  ----------------------------<  134<H>  4.0   |  25  |  1.02    Ca    8.6      20 Sep 2017 03:59  Phos  3.6     09-20  Mg     1.9     09-20    TPro  5.8<L>  /  Alb  2.7<L>  /  TBili  0.5  /  DBili  x   /  AST  41<H>  /  ALT  70<H>  /  AlkPhos  61  09-20    proBNP:   Lipid Profile:   HgA1c:   TSH:

## 2017-09-20 NOTE — PROGRESS NOTE ADULT - ASSESSMENT
71M from home, still works PMH of DM, HTN, Kidney stones, HLD who presents to hospital for syncope, possible seizure while on the way to work and episode of bloody stool incontinence.  Pt had 2 RRTs today for 2 episodes of bright red bloody bowel movement and hypotension to 80s, s/p 3 litre boluses and 1 unit pRBC being given. ICU accepted the pt as he is hemodynamically unstable sec to active Lower GI bleed.     1)  Hypovolemic shock 2/2 LGI bleed.     s/p code fusion. s/p total 8PRBC 4FFP  Lower GI bleed-possible diverticulosis bleed with CT scan questionable diverticulitis without relevant physical exam finding(LLQ tenderness). Also with hemorrhagic shock and symptomatic anemia from blood loss.  -monitor CBC  clear liquid  -GI and surgery consulted.  -No aspirin or anticoagulants or heparin/  Lovenox at this time.  -CTA -negative for source of bleed.  but concern for cecal diverticulitis.   -NGT with no evident of upper GI bleed. Bleeding scan negative.   -Metro and cipro while ruling out diverticulitis. day 9/10  -colonoscopy shows no bleeding site in the left colon, likely bleed could be in right colon.  -Currently the Hb is stable  -no role for IR angiography as bleeding scan and CTA are more sensitive and negative.   Patient still has reci    2) Diabetes.  Plan: HSS and accu chek.   A1C 7.0.     3) Need for prophylactic measure.  no chemical AC due to recent bleed.   on Protonix BID IV. 71M from home, still works PMH of DM, HTN, Kidney stones, HLD who presents to hospital for syncope, possible seizure while on the way to work and episode of bloody stool incontinence.  Pt had 2 RRTs today for 2 episodes of bright red bloody bowel movement and hypotension to 80s, s/p 3 litre boluses and 1 unit pRBC being given. ICU accepted the pt as he is hemodynamically unstable sec to active Lower GI bleed.     1)  Hypovolemic shock 2/2 LGI bleed.     s/p code fusion. s/p total 8PRBC 4FFP  Lower GI bleed-possible diverticulosis bleed with CT scan questionable diverticulitis without relevant physical exam finding(LLQ tenderness). Also with hemorrhagic shock and symptomatic anemia from blood loss.  -monitor CBC  clear liquid  -GI and surgery consulted.  -No aspirin or anticoagulants or heparin/  Lovenox at this time.  -CTA -negative for source of bleed.  but concern for cecal diverticulitis.   -NGT with no evident of upper GI bleed. Bleeding scan negative.   -Metro and cipro while ruling out diverticulitis. day 9/10  -colonoscopy shows no bleeding site in the left colon, likely bleed could be in right colon.  -Currently the Hb is stable  -no role for IR angiography as bleeding scan and CTA are more sensitive and negative.   Patient still has recurrent GI bleed.    2) Diabetes.  Plan: HSS and accu chek.   A1C 7.0.     3) Need for prophylactic measure.  no chemical AC due to recent bleed.   on Protonix BID IV.

## 2017-09-20 NOTE — PROGRESS NOTE ADULT - SUBJECTIVE AND OBJECTIVE BOX
[ X  ] ICU               [   ] CCU                [   ] Medical Floor      Patient remain in ICU for monitoring. He is comfortable. No new complaints reported, No abdominal pain, N/V, hematemesis, hematochezia, melena, fever, chills, chest pain, SOB, cough or diarrhea reported.         Vital Signs Last 24 Hrs  T(C): 36.7 (20 Sep 2017 17:18), Max: 37.2 (19 Sep 2017 20:00)  T(F): 98 (20 Sep 2017 17:18), Max: 99 (19 Sep 2017 20:00)  HR: 73 (20 Sep 2017 17:00) (63 - 90)  BP: 112/68 (20 Sep 2017 17:00) (97/59 - 122/69)  BP(mean): 79 (20 Sep 2017 17:00) (67 - 81)  RR: 12 (20 Sep 2017 14:00) (9 - 20)  SpO2: 100% (20 Sep 2017 17:00) (96% - 100%)         MEDICATIONS  (STANDING):  atorvastatin 40 milliGRAM(s) Oral at bedtime  pantoprazole  Injectable 40 milliGRAM(s) IV Push two times a day  insulin lispro (HumaLOG) corrective regimen sliding scale   SubCutaneous every 6 hours  artificial  tears Solution 1 Drop(s) Both EYES two times a day  sodium chloride 0.9%. 1000 milliLiter(s) (100 mL/Hr) IV Continuous <Continuous>    MEDICATIONS  (PRN):  HYDROmorphone  Injectable 0.5 milliGRAM(s) IV Push every 4 hours PRN Moderate Pain (4 - 6)  simethicone 80 milliGRAM(s) Chew every 4 hours PRN Gas,indigestion                            9.7    8.1   )-----------( 348      ( 20 Sep 2017 13:47 )             27.8       09-20    139  |  107  |  16  ----------------------------<  134<H>  4.0   |  25  |  1.02    Ca    8.6      20 Sep 2017 03:59  Phos  3.6     09-20  Mg     1.9     09-20    TPro  5.8<L>  /  Alb  2.7<L>  /  TBili  0.5  /  DBili  x   /  AST  41<H>  /  ALT  70<H>  /  AlkPhos  61  09-20      PT/INR - ( 19 Sep 2017 17:24 )   PT: 16.0 sec;   INR: 1.46 ratio         PTT - ( 19 Sep 2017 17:24 )  PTT:29.7 sec

## 2017-09-20 NOTE — PROGRESS NOTE ADULT - SUBJECTIVE AND OBJECTIVE BOX
INTERVAL HPI/ OVERNIGHT EVENTS: 1 dark bowel movement in the afternoon yesterday and CTA urgent was negative. Patient ahd another bloody BM overnight with H/H stable and vitals stable.       Other:  atorvastatin 40 milliGRAM(s) Oral at bedtime  pantoprazole  Injectable 40 milliGRAM(s) IV Push two times a day  insulin lispro (HumaLOG) corrective regimen sliding scale   SubCutaneous every 6 hours  artificial  tears Solution 1 Drop(s) Both EYES two times a day  HYDROmorphone  Injectable 0.5 milliGRAM(s) IV Push every 4 hours PRN  simethicone 80 milliGRAM(s) Chew every 4 hours PRN  sodium chloride 0.9%. 1000 milliLiter(s) IV Continuous <Continuous>    atorvastatin 40 milliGRAM(s) Oral at bedtime  pantoprazole  Injectable 40 milliGRAM(s) IV Push two times a day  insulin lispro (HumaLOG) corrective regimen sliding scale   SubCutaneous every 6 hours  ciprofloxacin   IVPB      ciprofloxacin   IVPB 400 milliGRAM(s) IV Intermittent every 12 hours  metroNIDAZOLE  IVPB      metroNIDAZOLE  IVPB 500 milliGRAM(s) IV Intermittent every 8 hours  artificial  tears Solution 1 Drop(s) Both EYES two times a day  HYDROmorphone  Injectable 0.5 milliGRAM(s) IV Push every 4 hours PRN  simethicone 80 milliGRAM(s) Chew every 4 hours PRN  sodium chloride 0.9%. 1000 milliLiter(s) IV Continuous <Continuous>    Drug Dosing Weight    Weight (kg): 60 (11 Sep 2017 13:19)    CENTRAL LINE: [ ] YES [x ] NO  LOCATION:   DATE INSERTED:  REMOVE: [ ] YES [ ] NO  EXPLAIN:    GARLAND: [ ] YES [x ] NO    DATE INSERTED:  REMOVE:  [ ] YES [ ] NO  EXPLAIN:    A-LINE:  [ ] YES [x ] NO  LOCATION:   DATE INSERTED:  REMOVE:  [ ] YES [ ] NO  EXPLAIN:    PMH -reviewed admission note, no change since admission      ICU Vital Signs Last 24 Hrs  T(C): 36.8 (20 Sep 2017 04:00), Max: 37.2 (19 Sep 2017 20:00)  T(F): 98.3 (20 Sep 2017 04:00), Max: 99 (19 Sep 2017 20:00)  HR: 63 (20 Sep 2017 07:00) (61 - 107)  BP: 97/59 (20 Sep 2017 07:00) (87/46 - 137/71)  BP(mean): 67 (20 Sep 2017 07:00) (56 - 86)  ABP: --  ABP(mean): --  RR: 9 (20 Sep 2017 07:00) (9 - 20)  SpO2: 97% (20 Sep 2017 07:00) (96% - 100%)            09-19 @ 07:01  -  09-20 @ 07:00  --------------------------------------------------------  IN: 1430 mL / OUT: 925 mL / NET: 505 mL            PHYSICAL EXAM:    CONSTITUTIONAL: No fever, weight loss, or fatigue  	EYES: No eye pain, visual disturbances, or discharge  	ENMT:  No difficulty hearing, tinnitus, vertigo; No sinus or throat pain  	NECK: No pain or stiffness  	RESPIRATORY: No cough, wheezing, chills or hemoptysis; No shortness of breath  	CARDIOVASCULAR: syncope, dizziness - no chest pain, no palpitations  	GASTROINTESTINAL: mild tenderness in suprapubic area   	GENITOURINARY: No dysuria, frequency, hematuria, or incontinence  	NEUROLOGICAL: No headaches, memory loss, loss of strength, numbness, or tremors  	SKIN: No itching, burning, rashes, or lesions   	LYMPH NODES: No enlarged glands  	ENDOCRINE: No heat or cold intolerance; No hair loss  	MUSCULOSKELETAL: No joint pain or swelling; No muscle, back, or extremity pain  	PSYCHIATRIC: No depression, anxiety, mood swings, or difficulty sleeping  	HEME/LYMPH: No easy bruising, or bleeding gums    ALLERY AND IMMUNOLOGIC: No hives or eczema        LABS:  CBC Full  -  ( 20 Sep 2017 03:59 )  WBC Count : 10.4 K/uL  Hemoglobin : 9.4 g/dL  Hematocrit : 27.5 %  Platelet Count - Automated : 341 K/uL  Mean Cell Volume : 87.5 fl  Mean Cell Hemoglobin : 30.0 pg  Mean Cell Hemoglobin Concentration : 34.3 gm/dL  09-20    139  |  107  |  16  ----------------------------<  134<H>  4.0   |  25  |  1.02    Ca    8.6      20 Sep 2017 03:59  Phos  3.6     09-20  Mg     1.9     09-20    TPro  5.8<L>  /  Alb  2.7<L>  /  TBili  0.5  /  DBili  x   /  AST  41<H>  /  ALT  70<H>  /  AlkPhos  61  09-20    PT/INR - ( 19 Sep 2017 17:24 )   PT: 16.0 sec;   INR: 1.46 ratio         PTT - ( 19 Sep 2017 17:24 )  PTT:29.7 sec          [x ]GOALS OF CARE DISCUSSION WITH PATIENT/FAMILY/PROXY:    CRITICAL CARE TIME SPENT: 35 minutes INTERVAL HPI/ OVERNIGHT EVENTS: 1 dark bowel movement in the afternoon yesterday and CTA urgent was negative. Patient had another 2 bloody BM overnight with H/H stable and vitals stable.       Other:  atorvastatin 40 milliGRAM(s) Oral at bedtime  pantoprazole  Injectable 40 milliGRAM(s) IV Push two times a day  insulin lispro (HumaLOG) corrective regimen sliding scale   SubCutaneous every 6 hours  artificial  tears Solution 1 Drop(s) Both EYES two times a day  HYDROmorphone  Injectable 0.5 milliGRAM(s) IV Push every 4 hours PRN  simethicone 80 milliGRAM(s) Chew every 4 hours PRN  sodium chloride 0.9%. 1000 milliLiter(s) IV Continuous <Continuous>    atorvastatin 40 milliGRAM(s) Oral at bedtime  pantoprazole  Injectable 40 milliGRAM(s) IV Push two times a day  insulin lispro (HumaLOG) corrective regimen sliding scale   SubCutaneous every 6 hours  ciprofloxacin   IVPB      ciprofloxacin   IVPB 400 milliGRAM(s) IV Intermittent every 12 hours  metroNIDAZOLE  IVPB      metroNIDAZOLE  IVPB 500 milliGRAM(s) IV Intermittent every 8 hours  artificial  tears Solution 1 Drop(s) Both EYES two times a day  HYDROmorphone  Injectable 0.5 milliGRAM(s) IV Push every 4 hours PRN  simethicone 80 milliGRAM(s) Chew every 4 hours PRN  sodium chloride 0.9%. 1000 milliLiter(s) IV Continuous <Continuous>    Drug Dosing Weight    Weight (kg): 60 (11 Sep 2017 13:19)    CENTRAL LINE: [ ] YES [x ] NO  LOCATION:   DATE INSERTED:  REMOVE: [ ] YES [ ] NO  EXPLAIN:    G[place]: [ ] YES [x ] NO    DATE INSERTED:  REMOVE:  [ ] YES [ ] NO  EXPLAIN:    A-LINE:  [ ] YES [x ] NO  LOCATION:   DATE INSERTED:  REMOVE:  [ ] YES [ ] NO  EXPLAIN:    PMH -reviewed admission note, no change since admission      ICU Vital Signs Last 24 Hrs  T(C): 36.8 (20 Sep 2017 04:00), Max: 37.2 (19 Sep 2017 20:00)  T(F): 98.3 (20 Sep 2017 04:00), Max: 99 (19 Sep 2017 20:00)  HR: 63 (20 Sep 2017 07:00) (61 - 107)  BP: 97/59 (20 Sep 2017 07:00) (87/46 - 137/71)  BP(mean): 67 (20 Sep 2017 07:00) (56 - 86)  ABP: --  ABP(mean): --  RR: 9 (20 Sep 2017 07:00) (9 - 20)  SpO2: 97% (20 Sep 2017 07:00) (96% - 100%)            09-19 @ 07:01  -  09-20 @ 07:00  --------------------------------------------------------  IN: 1430 mL / OUT: 925 mL / NET: 505 mL            PHYSICAL EXAM:    CONSTITUTIONAL: No fever, weight loss, or fatigue  	EYES: No eye pain, visual disturbances, or discharge  	ENMT:  No difficulty hearing, tinnitus, vertigo; No sinus or throat pain  	NECK: No pain or stiffness  	RESPIRATORY: No cough, wheezing, chills or hemoptysis; No shortness of breath  	CARDIOVASCULAR: syncope, dizziness - no chest pain, no palpitations  	GASTROINTESTINAL: mild tenderness in suprapubic area   	GENITOURINARY: No dysuria, frequency, hematuria, or incontinence  	NEUROLOGICAL: No headaches, memory loss, loss of strength, numbness, or tremors  	SKIN: No itching, burning, rashes, or lesions   	LYMPH NODES: No enlarged glands  	ENDOCRINE: No heat or cold intolerance; No hair loss  	MUSCULOSKELETAL: No joint pain or swelling; No muscle, back, or extremity pain  	PSYCHIATRIC: No depression, anxiety, mood swings, or difficulty sleeping  	HEME/LYMPH: No easy bruising, or bleeding gums    ALLERY AND IMMUNOLOGIC: No hives or eczema        LABS:  CBC Full  -  ( 20 Sep 2017 03:59 )  WBC Count : 10.4 K/uL  Hemoglobin : 9.4 g/dL  Hematocrit : 27.5 %  Platelet Count - Automated : 341 K/uL  Mean Cell Volume : 87.5 fl  Mean Cell Hemoglobin : 30.0 pg  Mean Cell Hemoglobin Concentration : 34.3 gm/dL  09-20    139  |  107  |  16  ----------------------------<  134<H>  4.0   |  25  |  1.02    Ca    8.6      20 Sep 2017 03:59  Phos  3.6     09-20  Mg     1.9     09-20    TPro  5.8<L>  /  Alb  2.7<L>  /  TBili  0.5  /  DBili  x   /  AST  41<H>  /  ALT  70<H>  /  AlkPhos  61  09-20    PT/INR - ( 19 Sep 2017 17:24 )   PT: 16.0 sec;   INR: 1.46 ratio         PTT - ( 19 Sep 2017 17:24 )  PTT:29.7 sec          [x ]GOALS OF CARE DISCUSSION WITH PATIENT/FAMILY/PROXY:    CRITICAL CARE TIME SPENT: 35 minutes

## 2017-09-21 LAB
ALBUMIN SERPL ELPH-MCNC: 2.7 G/DL — LOW (ref 3.5–5)
ALP SERPL-CCNC: 59 U/L — SIGNIFICANT CHANGE UP (ref 40–120)
ALT FLD-CCNC: 63 U/L DA — HIGH (ref 10–60)
ANION GAP SERPL CALC-SCNC: 5 MMOL/L — SIGNIFICANT CHANGE UP (ref 5–17)
AST SERPL-CCNC: 44 U/L — HIGH (ref 10–40)
BILIRUB SERPL-MCNC: 0.4 MG/DL — SIGNIFICANT CHANGE UP (ref 0.2–1.2)
BUN SERPL-MCNC: 12 MG/DL — SIGNIFICANT CHANGE UP (ref 7–18)
CALCIUM SERPL-MCNC: 8.4 MG/DL — SIGNIFICANT CHANGE UP (ref 8.4–10.5)
CHLORIDE SERPL-SCNC: 110 MMOL/L — HIGH (ref 96–108)
CO2 SERPL-SCNC: 26 MMOL/L — SIGNIFICANT CHANGE UP (ref 22–31)
CREAT SERPL-MCNC: 0.95 MG/DL — SIGNIFICANT CHANGE UP (ref 0.5–1.3)
GLUCOSE SERPL-MCNC: 98 MG/DL — SIGNIFICANT CHANGE UP (ref 70–99)
HCT VFR BLD CALC: 26.5 % — LOW (ref 39–50)
HGB BLD-MCNC: 9.1 G/DL — LOW (ref 13–17)
MAGNESIUM SERPL-MCNC: 1.9 MG/DL — SIGNIFICANT CHANGE UP (ref 1.6–2.6)
MCHC RBC-ENTMCNC: 29.9 PG — SIGNIFICANT CHANGE UP (ref 27–34)
MCHC RBC-ENTMCNC: 34.3 GM/DL — SIGNIFICANT CHANGE UP (ref 32–36)
MCV RBC AUTO: 87.2 FL — SIGNIFICANT CHANGE UP (ref 80–100)
PHOSPHATE SERPL-MCNC: 3.4 MG/DL — SIGNIFICANT CHANGE UP (ref 2.5–4.5)
PLATELET # BLD AUTO: 375 K/UL — SIGNIFICANT CHANGE UP (ref 150–400)
POTASSIUM SERPL-MCNC: 3.9 MMOL/L — SIGNIFICANT CHANGE UP (ref 3.5–5.3)
POTASSIUM SERPL-SCNC: 3.9 MMOL/L — SIGNIFICANT CHANGE UP (ref 3.5–5.3)
PROT SERPL-MCNC: 5.6 G/DL — LOW (ref 6–8.3)
RBC # BLD: 3.04 M/UL — LOW (ref 4.2–5.8)
RBC # FLD: 13.2 % — SIGNIFICANT CHANGE UP (ref 10.3–14.5)
SODIUM SERPL-SCNC: 141 MMOL/L — SIGNIFICANT CHANGE UP (ref 135–145)
WBC # BLD: 7.2 K/UL — SIGNIFICANT CHANGE UP (ref 3.8–10.5)
WBC # FLD AUTO: 7.2 K/UL — SIGNIFICANT CHANGE UP (ref 3.8–10.5)

## 2017-09-21 RX ADMIN — Medication 3: at 18:10

## 2017-09-21 RX ADMIN — ATORVASTATIN CALCIUM 40 MILLIGRAM(S): 80 TABLET, FILM COATED ORAL at 22:08

## 2017-09-21 RX ADMIN — PANTOPRAZOLE SODIUM 40 MILLIGRAM(S): 20 TABLET, DELAYED RELEASE ORAL at 05:05

## 2017-09-21 RX ADMIN — Medication 1 DROP(S): at 18:10

## 2017-09-21 RX ADMIN — Medication 1 DROP(S): at 05:06

## 2017-09-21 RX ADMIN — PANTOPRAZOLE SODIUM 40 MILLIGRAM(S): 20 TABLET, DELAYED RELEASE ORAL at 18:10

## 2017-09-21 NOTE — PROGRESS NOTE ADULT - ASSESSMENT
pt downgraded to floor   doing  no furthur bleed.  nio dizzyness.  GI following him.  on full diet  will watch him for 24 hours.

## 2017-09-21 NOTE — PROGRESS NOTE ADULT - PROBLEM SELECTOR PLAN 1
diet per GI  no surgical intervention indicated at present time  reconsult if abnormal findings noted on repeat colonoscopy requiring surgical evaluation

## 2017-09-21 NOTE — PROGRESS NOTE ADULT - PROBLEM SELECTOR PROBLEM 1
Hypovolemic shock
Lower GI bleed
Hypovolemic shock

## 2017-09-21 NOTE — PROGRESS NOTE ADULT - SUBJECTIVE AND OBJECTIVE BOX
CHIEF COMPLAINT:Patient is a 71y old  Male who presents with a chief complaint of Passing out, fecal incontinence, blood in stool (09 Sep 2017 17:45)    	          REVIEW OF SYSTEMS:  CONSTITUTIONAL: No fever, weight loss, or fatigue  EYES: No eye pain, visual disturbances, or discharge  NECK: No pain or stiffness  RESPIRATORY: No cough, wheezing, chills or hemoptysis; No Shortness of Breath  CARDIOVASCULAR: No chest pain, palpitations, passing out, dizziness, or leg swelling  GASTROINTESTINAL: No abdominal or epigastric pain. No nausea, vomiting, or hematemesis; No diarrhea or constipation. No melena or hematochezia.  GENITOURINARY: No dysuria, frequency, hematuria, or incontinence  NEUROLOGICAL: No headaches, memory loss, loss of strength, numbness, or tremors  SKIN: No itching, burning, rashes, or lesions   LYMPH Nodes: No enlarged glands  ENDOCRINE: No heat or cold intolerance; No hair loss  MUSCULOSKELETAL: No joint pain or swelling; No muscle, back, or extremity pain    Medications:  MEDICATIONS  (STANDING):  atorvastatin 40 milliGRAM(s) Oral at bedtime  pantoprazole  Injectable 40 milliGRAM(s) IV Push two times a day  insulin lispro (HumaLOG) corrective regimen sliding scale   SubCutaneous every 6 hours  artificial  tears Solution 1 Drop(s) Both EYES two times a day    MEDICATIONS  (PRN):  HYDROmorphone  Injectable 0.5 milliGRAM(s) IV Push every 4 hours PRN Moderate Pain (4 - 6)  simethicone 80 milliGRAM(s) Chew every 4 hours PRN Gas,indigestion  acetaminophen    Suspension. 650 milliGRAM(s) Oral every 6 hours PRN Moderate Pain (4 - 6)    	    PHYSICAL EXAM:  T(C): 36.8 (09-21-17 @ 06:29), Max: 37.4 (09-20-17 @ 20:20)  HR: 66 (09-21-17 @ 06:29) (66 - 86)  BP: 127/73 (09-21-17 @ 06:29) (111/69 - 138/75)  RR: 18 (09-21-17 @ 06:29) (8 - 24)  SpO2: 100% (09-21-17 @ 06:29) (98% - 100%)  Wt(kg): --  I&O's Summary    20 Sep 2017 07:01  -  21 Sep 2017 07:00  --------------------------------------------------------  IN: 1820 mL / OUT: 2675 mL / NET: -855 mL        Appearance: Normal	  HEENT:   Normal oral mucosa, PERRL, EOMI	  Lymphatic: No lymphadenopathy  Cardiovascular: Normal S1 S2, No JVD, No murmurs, No edema  Respiratory: Lungs clear to auscultation	  Psychiatry: A & O x 3, Mood & affect appropriate  Gastrointestinal:  Soft, Non-tender, + BS	  Skin: No rashes, No ecchymoses, No cyanosis	  Neurologic: Non-focal  Extremities: Normal range of motion, No clubbing, cyanosis or edema  Vascular: Peripheral pulses palpable 2+ bilaterally    TELEMETRY: 	    ECG:  	  RADIOLOGY:  OTHER: 	  	  LABS:	 	    CARDIAC MARKERS:                                9.1    7.2   )-----------( 375      ( 21 Sep 2017 04:16 )             26.5     09-21    141  |  110<H>  |  12  ----------------------------<  98  3.9   |  26  |  0.95    Ca    8.4      21 Sep 2017 04:16  Phos  3.4     09-21  Mg     1.9     09-21    TPro  5.6<L>  /  Alb  2.7<L>  /  TBili  0.4  /  DBili  x   /  AST  44<H>  /  ALT  63<H>  /  AlkPhos  59  09-21    proBNP:   Lipid Profile:   HgA1c:   TSH:

## 2017-09-21 NOTE — PROGRESS NOTE ADULT - SUBJECTIVE AND OBJECTIVE BOX
INTERVAL HPI/OVERNIGHT EVENTS:  Pt resting comfortably. No acute complaints. Dark BM's without hematochezia  NPO    MEDICATIONS  (STANDING):  atorvastatin 40 milliGRAM(s) Oral at bedtime  pantoprazole  Injectable 40 milliGRAM(s) IV Push two times a day  insulin lispro (HumaLOG) corrective regimen sliding scale   SubCutaneous every 6 hours  artificial  tears Solution 1 Drop(s) Both EYES two times a day    MEDICATIONS  (PRN):  HYDROmorphone  Injectable 0.5 milliGRAM(s) IV Push every 4 hours PRN Moderate Pain (4 - 6)  simethicone 80 milliGRAM(s) Chew every 4 hours PRN Gas,indigestion  acetaminophen    Suspension. 650 milliGRAM(s) Oral every 6 hours PRN Moderate Pain (4 - 6)      Vital Signs Last 24 Hrs  T(C): 36.8 (21 Sep 2017 06:29), Max: 37.4 (20 Sep 2017 20:20)  T(F): 98.3 (21 Sep 2017 06:29), Max: 99.3 (20 Sep 2017 20:20)  HR: 66 (21 Sep 2017 06:29) (66 - 86)  BP: 127/73 (21 Sep 2017 06:29) (104/62 - 138/75)  BP(mean): 75 (21 Sep 2017 05:00) (72 - 91)  RR: 18 (21 Sep 2017 06:29) (8 - 24)  SpO2: 100% (21 Sep 2017 06:29) (97% - 100%)    Physical:  General: A&Ox3. NAD.  Abdomen: Soft nondistended, nontender.    I&O's Detail    20 Sep 2017 07:01  -  21 Sep 2017 07:00  --------------------------------------------------------  IN:    Oral Fluid: 320 mL    sodium chloride 0.9%: 1500 mL  Total IN: 1820 mL    OUT:    Voided: 2675 mL  Total OUT: 2675 mL    Total NET: -855 mL    LABS:                        9.1    7.2   )-----------( 375      ( 21 Sep 2017 04:16 )             26.5             09-21    141  |  110<H>  |  12  ----------------------------<  98  3.9   |  26  |  0.95    Ca    8.4      21 Sep 2017 04:16  Phos  3.4     09-21  Mg     1.9     09-21    TPro  5.6<L>  /  Alb  2.7<L>  /  TBili  0.4  /  DBili  x   /  AST  44<H>  /  ALT  63<H>  /  AlkPhos  59  09-21

## 2017-09-21 NOTE — PROGRESS NOTE ADULT - ASSESSMENT
71M from home, still works PMH of DM, HTN, Kidney stones, HLD who presents to hospital for syncope, possible seizure while on the way to work and episode of bloody stool incontinence.  Pt had 2 RRTs today for 2 episodes of bright red bloody bowel movement and hypotension to 80s, s/p 3 litre boluses and 1 unit pRBC being given. ICU accepted the pt as he is hemodynamically unstable sec to active Lower GI bleed.     1)  Hypovolemic shock 2/2 LGI bleed.    Resolved. No more bleeds   s/p code fusion. s/p total 8PRBC 4FFP  Lower GI bleed-possible diverticulosis bleed with CT scan questionable diverticulitis without relevant physical exam finding(LLQ tenderness). Also with hemorrhagic shock and symptomatic anemia from blood loss.  -Diet advanced to regular food.as per GI and surgery suggested no acute intervention.  -No aspirin or anticoagulants or heparin/  Lovenox at this time.  -CTA -negative for source of bleed.  but concern for cecal diverticulitis.   -NGT with no evident of upper GI bleed. Bleeding scan negative.   -Metro and cipro completed  -colonoscopy shows no bleeding site in the left colon, likely bleed could be in right colon.  -Currently the Hb is stable  -no role for IR angiography as bleeding scan and CTA are more sensitive and negative.   Physical therapy evaluated patient and suggested sub acute rehab.    2) Diabetes.  Plan: HSS and accu check.   A1C 7.0.       3) Need for prophylactic measure.  no chemical AC due to recent bleed.   on Protonix BID IV.

## 2017-09-21 NOTE — PROGRESS NOTE ADULT - SUBJECTIVE AND OBJECTIVE BOX
PGY 1 Note discussed with supervising resident and primary attending    Patient is a 71y old  Male who presents with a chief complaint of Passing out, fecal incontinence, blood in stool (09 Sep 2017 17:45)      INTERVAL HPI/OVERNIGHT EVENTS: patient was seen and examined at bed side.  service 300011. no new complaints. patients wants to eat regular food. Diet advanced to regular food after discussing with Dr Monatgue. Patient is for Sub acute rehab.    MEDICATIONS  (STANDING):  atorvastatin 40 milliGRAM(s) Oral at bedtime  pantoprazole  Injectable 40 milliGRAM(s) IV Push two times a day  insulin lispro (HumaLOG) corrective regimen sliding scale   SubCutaneous every 6 hours  artificial  tears Solution 1 Drop(s) Both EYES two times a day    MEDICATIONS  (PRN):  HYDROmorphone  Injectable 0.5 milliGRAM(s) IV Push every 4 hours PRN Moderate Pain (4 - 6)  simethicone 80 milliGRAM(s) Chew every 4 hours PRN Gas,indigestion  acetaminophen    Suspension. 650 milliGRAM(s) Oral every 6 hours PRN Moderate Pain (4 - 6)      __________________________________________________  REVIEW OF SYSTEMS:    CONSTITUTIONAL: No fever,   EYES: no acute visual disturbances  NECK: No pain or stiffness  RESPIRATORY: No cough; No shortness of breath  CARDIOVASCULAR: No chest pain, no palpitations  GASTROINTESTINAL: No pain. No nausea or vomiting; No diarrhea   NEUROLOGICAL: No headache or numbness, no tremors  MUSCULOSKELETAL: No joint pain, no muscle pain  GENITOURINARY: no dysuria, no frequency, no hesitancy  PSYCHIATRY: no depression , no anxiety  ALL OTHER  ROS negative        Vital Signs Last 24 Hrs  T(C): 36.8 (21 Sep 2017 06:29), Max: 37.4 (20 Sep 2017 20:20)  T(F): 98.3 (21 Sep 2017 06:29), Max: 99.3 (20 Sep 2017 20:20)  HR: 66 (21 Sep 2017 06:29) (66 - 86)  BP: 127/73 (21 Sep 2017 06:29) (111/69 - 138/75)  BP(mean): 75 (21 Sep 2017 05:00) (74 - 91)  RR: 18 (21 Sep 2017 06:29) (8 - 24)  SpO2: 100% (21 Sep 2017 06:29) (98% - 100%)    ________________________________________________  PHYSICAL EXAM:  GENERAL: NAD  HEENT: Normocephalic;  conjunctivae and sclerae clear; moist mucous membranes;   NECK : supple  CHEST/LUNG: Clear to auscultation bilaterally with good air entry   HEART: S1 S2  regular; no murmurs, gallops or rubs  ABDOMEN: Soft, Nontender, Nondistended; Bowel sounds present  EXTREMITIES: no cyanosis; no edema; no calf tenderness  SKIN: warm and dry; no rash  NERVOUS SYSTEM:  Awake and alert; Oriented  to place, person and time ; no new deficits    _________________________________________________  LABS:                        9.1    7.2   )-----------( 375      ( 21 Sep 2017 04:16 )             26.5     09-21    141  |  110<H>  |  12  ----------------------------<  98  3.9   |  26  |  0.95    Ca    8.4      21 Sep 2017 04:16  Phos  3.4     09-21  Mg     1.9     09-21    TPro  5.6<L>  /  Alb  2.7<L>  /  TBili  0.4  /  DBili  x   /  AST  44<H>  /  ALT  63<H>  /  AlkPhos  59  09-21    PT/INR - ( 19 Sep 2017 17:24 )   PT: 16.0 sec;   INR: 1.46 ratio         PTT - ( 19 Sep 2017 17:24 )  PTT:29.7 sec    CAPILLARY BLOOD GLUCOSE  131 (21 Sep 2017 11:47)  104 (21 Sep 2017 05:00)  102 (21 Sep 2017 00:00)  118 (20 Sep 2017 17:20)            RADIOLOGY & ADDITIONAL TESTS:    Imaging Personally Reviewed:  YES/NO    Consultant(s) Notes Reviewed:   YES/ No    Care Discussed with Consultants :     Plan of care was discussed with patient and /or primary care giver; all questions and concerns were addressed and care was aligned with patient's wishes.

## 2017-09-22 LAB
ALBUMIN SERPL ELPH-MCNC: 2.7 G/DL — LOW (ref 3.5–5)
ALP SERPL-CCNC: 61 U/L — SIGNIFICANT CHANGE UP (ref 40–120)
ALT FLD-CCNC: 66 U/L DA — HIGH (ref 10–60)
ANION GAP SERPL CALC-SCNC: 8 MMOL/L — SIGNIFICANT CHANGE UP (ref 5–17)
AST SERPL-CCNC: 44 U/L — HIGH (ref 10–40)
BILIRUB SERPL-MCNC: 0.3 MG/DL — SIGNIFICANT CHANGE UP (ref 0.2–1.2)
BUN SERPL-MCNC: 16 MG/DL — SIGNIFICANT CHANGE UP (ref 7–18)
CALCIUM SERPL-MCNC: 8.4 MG/DL — SIGNIFICANT CHANGE UP (ref 8.4–10.5)
CHLORIDE SERPL-SCNC: 111 MMOL/L — HIGH (ref 96–108)
CO2 SERPL-SCNC: 21 MMOL/L — LOW (ref 22–31)
CREAT SERPL-MCNC: 1.02 MG/DL — SIGNIFICANT CHANGE UP (ref 0.5–1.3)
GLUCOSE SERPL-MCNC: 143 MG/DL — HIGH (ref 70–99)
HCT VFR BLD CALC: 28.6 % — LOW (ref 39–50)
HGB BLD-MCNC: 9.8 G/DL — LOW (ref 13–17)
MAGNESIUM SERPL-MCNC: 1.9 MG/DL — SIGNIFICANT CHANGE UP (ref 1.6–2.6)
MCHC RBC-ENTMCNC: 30 PG — SIGNIFICANT CHANGE UP (ref 27–34)
MCHC RBC-ENTMCNC: 34.4 GM/DL — SIGNIFICANT CHANGE UP (ref 32–36)
MCV RBC AUTO: 87.2 FL — SIGNIFICANT CHANGE UP (ref 80–100)
PHOSPHATE SERPL-MCNC: 3.1 MG/DL — SIGNIFICANT CHANGE UP (ref 2.5–4.5)
PLATELET # BLD AUTO: 388 K/UL — SIGNIFICANT CHANGE UP (ref 150–400)
POTASSIUM SERPL-MCNC: 4.3 MMOL/L — SIGNIFICANT CHANGE UP (ref 3.5–5.3)
POTASSIUM SERPL-SCNC: 4.3 MMOL/L — SIGNIFICANT CHANGE UP (ref 3.5–5.3)
PROT SERPL-MCNC: 5.9 G/DL — LOW (ref 6–8.3)
RBC # BLD: 3.28 M/UL — LOW (ref 4.2–5.8)
RBC # FLD: 13 % — SIGNIFICANT CHANGE UP (ref 10.3–14.5)
SODIUM SERPL-SCNC: 140 MMOL/L — SIGNIFICANT CHANGE UP (ref 135–145)
WBC # BLD: 10 K/UL — SIGNIFICANT CHANGE UP (ref 3.8–10.5)
WBC # FLD AUTO: 10 K/UL — SIGNIFICANT CHANGE UP (ref 3.8–10.5)

## 2017-09-22 RX ORDER — PANTOPRAZOLE SODIUM 20 MG/1
40 TABLET, DELAYED RELEASE ORAL
Qty: 0 | Refills: 0 | Status: DISCONTINUED | OUTPATIENT
Start: 2017-09-22 | End: 2017-09-25

## 2017-09-22 RX ORDER — PANTOPRAZOLE SODIUM 20 MG/1
1 TABLET, DELAYED RELEASE ORAL
Qty: 60 | Refills: 0 | OUTPATIENT
Start: 2017-09-22 | End: 2017-10-22

## 2017-09-22 RX ORDER — DOCUSATE SODIUM 100 MG
200 CAPSULE ORAL DAILY
Qty: 0 | Refills: 0 | Status: DISCONTINUED | OUTPATIENT
Start: 2017-09-22 | End: 2017-09-25

## 2017-09-22 RX ADMIN — Medication 1 DROP(S): at 06:47

## 2017-09-22 RX ADMIN — Medication 1: at 06:47

## 2017-09-22 RX ADMIN — Medication 1 DROP(S): at 18:00

## 2017-09-22 RX ADMIN — Medication 2: at 22:20

## 2017-09-22 RX ADMIN — Medication 3: at 12:44

## 2017-09-22 RX ADMIN — PANTOPRAZOLE SODIUM 40 MILLIGRAM(S): 20 TABLET, DELAYED RELEASE ORAL at 17:59

## 2017-09-22 RX ADMIN — Medication 3: at 18:00

## 2017-09-22 RX ADMIN — Medication: at 00:36

## 2017-09-22 RX ADMIN — ATORVASTATIN CALCIUM 40 MILLIGRAM(S): 80 TABLET, FILM COATED ORAL at 22:20

## 2017-09-22 RX ADMIN — PANTOPRAZOLE SODIUM 40 MILLIGRAM(S): 20 TABLET, DELAYED RELEASE ORAL at 06:47

## 2017-09-22 NOTE — DISCHARGE NOTE ADULT - [id number]_GEN_ALL_CORE
observation and assessment/teaching and training/medication teaching and assessment/rehabilitation services

## 2017-09-22 NOTE — DISCHARGE NOTE ADULT - INSTRUCTIONS
Diet appropriate to avoid systolic hypertension. Sodium restriction < 2 G daily. Blood pressure well controlled with diet. Avoid diet high in carbohydrates.

## 2017-09-22 NOTE — PROGRESS NOTE ADULT - ASSESSMENT
1. Diverticular bleeding Stopped  2. Anemia (H/H stable)  3. Diverticulitis    Suggestions:    1. Monitor H/H closely  2. Transfuse PRBC as needed  3. Protonix daily  4. Avoid NSAID's  5. DVT prophylaxis  6. Repeat colonoscopy out patient in 4-6 weeks

## 2017-09-22 NOTE — DISCHARGE NOTE ADULT - PLAN OF CARE
Blood pressure > 120/80 Patient had hypovolumic shock due to lower gastrointestinal tract bleeding likely secondary to diverticulitis. 8 units of blood were transfused and 4 units of fresh frozen plasma were given. Patient's blood pressure stable now. No active bleeding. Hemoglobin stable on 9.8 HbA1c <6.5 Patient's HbA1c is 7. you were treated here with insulin. continue to Januvia daily. follow up out patient with primary care doctor in  2 weeks for better continuity of care. <140/90 Blood pressure well controlled here. You were taking enalapril here but blood pressure well controlled with out medicine 109/61. continue to follow up with your primary care doctor to have proper follow up for hypertension. you have history of hyperlipidemia treated with statin. continue with simvastatin follow up with your primary care doctor in a week for better continuity of care. you were treated for lower GI bleed with blood transfusion, Protonix IV 40 mg daily two times a day. Source of bleed was possible diverticulitis. bleeding scan was negative for any source of bleeding. you need to follow up out patient with Gastroenterology in 2 weeks for better continuity of care. you were treated with ciprofloxacin and metronidazole for diverticulitis and completed course of antibiotics. Hb> 10 ( normal range 14-16) you had anemia due to extensive blood loss. you receive 8 units of blood. your H/H is stable on 9.8. you were treated with antibiotics ciprofloxacin and flagyl. your condition resolved. no more bleeding appreciated. diet was advanced to regular diet. Blood pressure well controlled here. You were taking enalapril here but blood pressure well controlled with out medicine 109/61. continue to follow up with your primary care doctor to have proper follow up. currently holding  BP medication due to hypotension, follow up with primary care to re start the medications

## 2017-09-22 NOTE — DISCHARGE NOTE ADULT - CARE PROVIDER_API CALL
Azeem Altman  Phone: [phone]  Fax: (       - Azeem Altman  Phone: [phone]  Fax: (   )    -    [address]), [place] [address]  Phone: [phone]  Fax: [phone]

## 2017-09-22 NOTE — DISCHARGE NOTE ADULT - ADDITIONAL INSTRUCTIONS
follow up with primary care doctor in 2 weeks   follow up with gastroenterologist in 2 weeks. follow up with primary care doctor in 2 weeks   follow up with gastroenterologist in 4- 6 weeks for repeat colonoscopy

## 2017-09-22 NOTE — DISCHARGE NOTE ADULT - CARE PLAN
Principal Discharge DX:	Hypovolemic shock  Secondary Diagnosis:	Diabetes  Secondary Diagnosis:	Hypertension  Secondary Diagnosis:	Hyperlipidemia  Secondary Diagnosis:	Lower GI bleed  Secondary Diagnosis:	Anemia due to blood loss Principal Discharge DX:	Hypovolemic shock  Goal:	Blood pressure > 120/80  Instructions for follow-up, activity and diet:	Patient had hypovolumic shock due to lower gastrointestinal tract bleeding likely secondary to diverticulitis. 8 units of blood were transfused and 4 units of fresh frozen plasma were given. Patient's blood pressure stable now. No active bleeding. Hemoglobin stable on 9.8  Secondary Diagnosis:	Diabetes  Goal:	HbA1c <6.5  Instructions for follow-up, activity and diet:	Patient's HbA1c is 7. you were treated here with insulin. continue to Januvia daily. follow up out patient with primary care doctor in  2 weeks for better continuity of care.  Secondary Diagnosis:	Hypertension  Goal:	<140/90  Instructions for follow-up, activity and diet:	Blood pressure well controlled here. You were taking enalapril here but blood pressure well controlled with out medicine 109/61. continue to follow up with your primary care doctor to have proper follow up for hypertension.  Secondary Diagnosis:	Hyperlipidemia  Secondary Diagnosis:	Lower GI bleed  Secondary Diagnosis:	Anemia due to blood loss Principal Discharge DX:	Hypovolemic shock  Goal:	Blood pressure > 120/80  Instructions for follow-up, activity and diet:	Patient had hypovolumic shock due to lower gastrointestinal tract bleeding likely secondary to diverticulitis. 8 units of blood were transfused and 4 units of fresh frozen plasma were given. Patient's blood pressure stable now. No active bleeding. Hemoglobin stable on 9.8  Secondary Diagnosis:	Diabetes  Goal:	HbA1c <6.5  Instructions for follow-up, activity and diet:	Patient's HbA1c is 7. you were treated here with insulin. continue to Januvia daily. follow up out patient with primary care doctor in  2 weeks for better continuity of care.  Secondary Diagnosis:	Hypertension  Goal:	<140/90  Instructions for follow-up, activity and diet:	Blood pressure well controlled here. You were taking enalapril here but blood pressure well controlled with out medicine 109/61. continue to follow up with your primary care doctor to have proper follow up for hypertension.  Secondary Diagnosis:	Hyperlipidemia  Instructions for follow-up, activity and diet:	you have history of hyperlipidemia treated with statin. continue with simvastatin follow up with your primary care doctor in a week for better continuity of care.  Secondary Diagnosis:	Lower GI bleed  Instructions for follow-up, activity and diet:	you were treated for lower GI bleed with blood transfusion, Protonix IV 40 mg daily two times a day. Source of bleed was possible diverticulitis. bleeding scan was negative for any source of bleeding. you need to follow up out patient with Gastroenterology in 2 weeks for better continuity of care. you were treated with ciprofloxacin and metronidazole for diverticulitis and completed course of antibiotics.  Secondary Diagnosis:	Anemia due to blood loss  Goal:	Hb> 10 ( normal range 14-16)  Instructions for follow-up, activity and diet:	you had anemia due to extensive blood loss. you receive 8 units of blood. your H/H is stable on 9.8. you were treated with antibiotics ciprofloxacin and flagyl. your condition resolved. no more bleeding appreciated. diet was advanced to regular diet. Principal Discharge DX:	Hypovolemic shock  Goal:	Blood pressure > 120/80  Instructions for follow-up, activity and diet:	Patient had hypovolumic shock due to lower gastrointestinal tract bleeding likely secondary to diverticulitis. 8 units of blood were transfused and 4 units of fresh frozen plasma were given. Patient's blood pressure stable now. No active bleeding. Hemoglobin stable on 9.8  Secondary Diagnosis:	Diabetes  Goal:	HbA1c <6.5  Instructions for follow-up, activity and diet:	Patient's HbA1c is 7. you were treated here with insulin. continue to Januvia daily. follow up out patient with primary care doctor in  2 weeks for better continuity of care.  Secondary Diagnosis:	Hypertension  Goal:	<140/90  Instructions for follow-up, activity and diet:	Blood pressure well controlled here. You were taking enalapril here but blood pressure well controlled with out medicine 109/61. continue to follow up with your primary care doctor to have proper follow up. currently holding  BP medication due to hypotension, follow up with primary care to re start the medications  Secondary Diagnosis:	Hyperlipidemia  Instructions for follow-up, activity and diet:	you have history of hyperlipidemia treated with statin. continue with simvastatin follow up with your primary care doctor in a week for better continuity of care.  Secondary Diagnosis:	Lower GI bleed  Instructions for follow-up, activity and diet:	you were treated for lower GI bleed with blood transfusion, Protonix IV 40 mg daily two times a day. Source of bleed was possible diverticulitis. bleeding scan was negative for any source of bleeding. you need to follow up out patient with Gastroenterology in 2 weeks for better continuity of care. you were treated with ciprofloxacin and metronidazole for diverticulitis and completed course of antibiotics.  Secondary Diagnosis:	Anemia due to blood loss  Goal:	Hb> 10 ( normal range 14-16)  Instructions for follow-up, activity and diet:	you had anemia due to extensive blood loss. you receive 8 units of blood. your H/H is stable on 9.8. you were treated with antibiotics ciprofloxacin and flagyl. your condition resolved. no more bleeding appreciated. diet was advanced to regular diet.

## 2017-09-22 NOTE — PROGRESS NOTE ADULT - SUBJECTIVE AND OBJECTIVE BOX
[   ] ICU             [   ] CCU           [ X  ] Medical Floor      Patient is comfortable. No new complaints reported, No abdominal pain, N/V, hematemesis, hematochezia, melena, fever, chills, chest pain, SOB, cough or diarrhea reported.    VITALS  Vital Signs Last 24 Hrs  T(C): 37 (22 Sep 2017 08:00), Max: 37.1 (22 Sep 2017 00:21)  T(F): 98.6 (22 Sep 2017 08:00), Max: 98.7 (22 Sep 2017 00:21)  HR: 74 (22 Sep 2017 08:00) (66 - 74)  BP: 109/61 (22 Sep 2017 08:00) (99/47 - 109/61)   RR: 16 (22 Sep 2017 08:00) (16 - 18)  SpO2: 100% (22 Sep 2017 08:00) (97% - 100%)       MEDICATIONS  (STANDING):  atorvastatin 40 milliGRAM(s) Oral at bedtime  insulin lispro (HumaLOG) corrective regimen sliding scale   SubCutaneous every 6 hours  artificial  tears Solution 1 Drop(s) Both EYES two times a day  pantoprazole    Tablet 40 milliGRAM(s) Oral two times a day before meals    MEDICATIONS  (PRN):  simethicone 80 milliGRAM(s) Chew every 4 hours PRN Gas,indigestion  acetaminophen    Suspension. 650 milliGRAM(s) Oral every 6 hours PRN Moderate Pain (4 - 6)                            9.8    10.0  )-----------( 388      ( 22 Sep 2017 04:15 )             28.6       09-22    140  |  111<H>  |  16  ----------------------------<  143<H>  4.3   |  21<L>  |  1.02    Ca    8.4      22 Sep 2017 04:15  Phos  3.1     09-22  Mg     1.9     09-22    TPro  5.9<L>  /  Alb  2.7<L>  /  TBili  0.3  /  DBili  x   /  AST  44<H>  /  ALT  66<H>  /  AlkPhos  61  09-22

## 2017-09-22 NOTE — PROGRESS NOTE ADULT - ASSESSMENT
diverticular bleed  now no furthur bleed not stable on feet   need short term rehab   watch hgb.  out pt GI , surgery f/u.

## 2017-09-22 NOTE — DISCHARGE NOTE ADULT - MEDICATION SUMMARY - MEDICATIONS TO TAKE
I will START or STAY ON the medications listed below when I get home from the hospital:    Janumet 50 mg-1000 mg oral tablet  -- 1 tab(s) by mouth 2 times a day  -- Indication: For Diabetes    simvastatin 40 mg oral tablet  -- 1 tab(s) by mouth once a day (at bedtime)  -- Indication: For Hyperlipidemia    pantoprazole 40 mg oral delayed release tablet  -- 1 tab(s) by mouth 2 times a day (before meals)  -- Indication: For GI bleed

## 2017-09-22 NOTE — PROGRESS NOTE ADULT - SUBJECTIVE AND OBJECTIVE BOX
CHIEF COMPLAINT:Patient is a 71y old  Male who presents with a chief complaint of Lower Gi bleed (22 Sep 2017 13:29)    no furthur bleed  but no bm for 2 days. no abd pain, no nausea or vom.	          REVIEW OF SYSTEMS:  CONSTITUTIONAL: No fever, weight loss, or fatigue  EYES: No eye pain, visual disturbances, or discharge  NECK: No pain or stiffness  RESPIRATORY: No cough, wheezing, chills or hemoptysis; No Shortness of Breath  CARDIOVASCULAR: No chest pain, palpitations, passing out, dizziness, or leg swelling  GASTROINTESTINAL: No abdominal or epigastric pain. No nausea, vomiting, or hematemesis; No diarrhea or constipation. No melena or hematochezia.  GENITOURINARY: No dysuria, frequency, hematuria, or incontinence  NEUROLOGICAL: No headaches, memory loss, loss of strength, numbness, or tremors  has muscle weakness   SKIN: No itching, burning, rashes, or lesions   LYMPH Nodes: No enlarged glands  ENDOCRINE: No heat or cold intolerance; No hair loss  MUSCULOSKELETAL: No joint pain or swelling; No muscle, back, or extremity pain    Medications:  MEDICATIONS  (STANDING):  atorvastatin 40 milliGRAM(s) Oral at bedtime  insulin lispro (HumaLOG) corrective regimen sliding scale   SubCutaneous every 6 hours  artificial  tears Solution 1 Drop(s) Both EYES two times a day  pantoprazole    Tablet 40 milliGRAM(s) Oral two times a day before meals    MEDICATIONS  (PRN):  simethicone 80 milliGRAM(s) Chew every 4 hours PRN Gas,indigestion  acetaminophen    Suspension. 650 milliGRAM(s) Oral every 6 hours PRN Moderate Pain (4 - 6)    	    PHYSICAL EXAM:  T(C): 36.9 (09-22-17 @ 15:24), Max: 37.1 (09-22-17 @ 00:21)  HR: 74 (09-22-17 @ 15:24) (66 - 75)  BP: 118/65 (09-22-17 @ 15:24) (99/47 - 118/65)  RR: 100 (09-22-17 @ 15:24) (16 - 100)  SpO2: 100% (09-22-17 @ 09:45) (97% - 100%)  Wt(kg): --  I&O's Summary      Appearance: Normal	  HEENT:   Normal oral mucosa, PERRL, EOMI	  Lymphatic: No lymphadenopathy  Cardiovascular: Normal S1 S2, No JVD, No murmurs, No edema  Respiratory: Lungs clear to auscultation	  Psychiatry: A & O x 3, Mood & affect appropriate  Gastrointestinal:  Soft, Non-tender, + BS	  Skin: No rashes, No ecchymoses, No cyanosis	  Neurologic: Non-focal  Extremities: Normal range of motion, No clubbing, cyanosis or edema  Vascular: Peripheral pulses palpable 2+ bilaterally    TELEMETRY: 	    ECG:  	  RADIOLOGY:  OTHER: 	  	  LABS:	 	    CARDIAC MARKERS:                                9.8    10.0  )-----------( 388      ( 22 Sep 2017 04:15 )             28.6     09-22    140  |  111<H>  |  16  ----------------------------<  143<H>  4.3   |  21<L>  |  1.02    Ca    8.4      22 Sep 2017 04:15  Phos  3.1     09-22  Mg     1.9     09-22    TPro  5.9<L>  /  Alb  2.7<L>  /  TBili  0.3  /  DBili  x   /  AST  44<H>  /  ALT  66<H>  /  AlkPhos  61  09-22    proBNP:   Lipid Profile:   HgA1c:   TSH:

## 2017-09-22 NOTE — DISCHARGE NOTE ADULT - PROVIDER TOKENS
FREE:[LAST:[Anupama],FIRST:[Azeem],PHONE:[phone]],FAX:[(   )    -]] FREE:[LAST:[Anupama],FIRST:[Azeem],PHONE:[phone]],FAX:[(   )    -]],TOKEN:'[id number]'

## 2017-09-22 NOTE — DISCHARGE NOTE ADULT - HOSPITAL COURSE
71M from home, still works PMH of DM, HTN, Kidney stones, HLD who presents to hospital for syncope, possible seizure while on the way to work and episode of bloody stool incontinence.  Pt had 2 RRTs today for 2 episodes of bright red bloody bowel movement and hypotension to 80s, s/p 3 litre boluses and 1 unit pRBC being given. ICU accepted the pt as he is hemodynamically unstable sec to active Lower GI bleed.     patient was in  Hypovolemic shock 2/2 LGI bleed. s/p code fusion. s/p total 8PRBC 4FFP  Lower GI bleed was due to possible diverticulosis bleed with CT scan questionable diverticulitis without relevant physical exam finding(LLQ tenderness). Also with hemorrhagic shock and symptomatic anemia from blood loss.  Cbc was monitored, GI and surgery consulted. suggested no acute intervention. colonoscopy was performed after patient completed course of antibiotics with ciprofloxacin and metronidazole. no possible source of bleeding was identified   patient was off anticoagulants due to GI bleed.   -CTA -negative for source of bleed.  but concern for cecal diverticulitis.   -NGT was put in with no evident of upper GI bleed. Bleeding scan negative.   -Currently the Hb is stable  -no role for IR angiography as bleeding scan and CTA are more sensitive and negative.     Patient has history of Diabetes. was on HSS and accu chek. will be discharged to home on Januvia home medication.     A1C 7.0.     3) Need for prophylactic measure.  no chemical AC due to recent bleed.   on Protonix BID IV.      H/H was stable at 9.8 71M from home, still works PMH of DM, HTN, Kidney stones, HLD who presents to hospital for syncope, possible seizure while on the way to work and episode of bloody stool incontinence.  Pt had 2 RRTs today for 2 episodes of bright red bloody bowel movement and hypotension to 80s, s/p 3 litre boluses and 1 unit pRBC being given. ICU accepted the pt as he is hemodynamically unstable sec to active Lower GI bleed.     patient was in  Hypovolemic shock 2/2 LGI bleed. s/p code fusion. s/p total 8PRBC 4FFP  Lower GI bleed was due to possible diverticulosis bleed with CT scan questionable diverticulitis without relevant physical exam finding(LLQ tenderness). Also with hemorrhagic shock and symptomatic anemia from blood loss.  Cbc was monitored, GI and surgery consulted. suggested no acute intervention. colonoscopy was performed after patient completed course of antibiotics with ciprofloxacin and metronidazole. no possible source of bleeding was identified   patient was off anticoagulants due to GI bleed.   -CTA -negative for source of bleed.  but concern for cecal diverticulitis.   -NGT was put in with no evident of upper GI bleed. Bleeding scan negative.   -Currently the Hb is stable  -no role for IR angiography as bleeding scan and CTA are more sensitive and negative.     Patient has history of Diabetes. was on HSS and accu chek. will be discharged to home on Januvia home medication.     A1C 7.0.     3) Need for prophylactic measure.  no chemical AC due to recent bleed.   on Protonix BID oral      H/H was stable at 9.8

## 2017-09-22 NOTE — DISCHARGE NOTE ADULT - PATIENT PORTAL LINK FT
“You can access the Follow[employer] Patient Portal, offered by [facility], by registering with the following website: [email or web address]”

## 2017-09-23 LAB
ANION GAP SERPL CALC-SCNC: 6 MMOL/L — SIGNIFICANT CHANGE UP (ref 5–17)
BUN SERPL-MCNC: 16 MG/DL — SIGNIFICANT CHANGE UP (ref 7–18)
CALCIUM SERPL-MCNC: 8.8 MG/DL — SIGNIFICANT CHANGE UP (ref 8.4–10.5)
CHLORIDE SERPL-SCNC: 108 MMOL/L — SIGNIFICANT CHANGE UP (ref 96–108)
CO2 SERPL-SCNC: 26 MMOL/L — SIGNIFICANT CHANGE UP (ref 22–31)
CREAT SERPL-MCNC: 1.11 MG/DL — SIGNIFICANT CHANGE UP (ref 0.5–1.3)
GLUCOSE SERPL-MCNC: 148 MG/DL — HIGH (ref 70–99)
HCT VFR BLD CALC: 28.5 % — LOW (ref 39–50)
HGB BLD-MCNC: 9.9 G/DL — LOW (ref 13–17)
MCHC RBC-ENTMCNC: 30.6 PG — SIGNIFICANT CHANGE UP (ref 27–34)
MCHC RBC-ENTMCNC: 34.7 GM/DL — SIGNIFICANT CHANGE UP (ref 32–36)
MCV RBC AUTO: 88.3 FL — SIGNIFICANT CHANGE UP (ref 80–100)
PLATELET # BLD AUTO: 369 K/UL — SIGNIFICANT CHANGE UP (ref 150–400)
POTASSIUM SERPL-MCNC: 4.5 MMOL/L — SIGNIFICANT CHANGE UP (ref 3.5–5.3)
POTASSIUM SERPL-SCNC: 4.5 MMOL/L — SIGNIFICANT CHANGE UP (ref 3.5–5.3)
RBC # BLD: 3.22 M/UL — LOW (ref 4.2–5.8)
RBC # FLD: 12.8 % — SIGNIFICANT CHANGE UP (ref 10.3–14.5)
SODIUM SERPL-SCNC: 140 MMOL/L — SIGNIFICANT CHANGE UP (ref 135–145)
WBC # BLD: 10.4 K/UL — SIGNIFICANT CHANGE UP (ref 3.8–10.5)
WBC # FLD AUTO: 10.4 K/UL — SIGNIFICANT CHANGE UP (ref 3.8–10.5)

## 2017-09-23 RX ORDER — INSULIN LISPRO 100/ML
VIAL (ML) SUBCUTANEOUS
Qty: 0 | Refills: 0 | Status: DISCONTINUED | OUTPATIENT
Start: 2017-09-23 | End: 2017-09-25

## 2017-09-23 RX ORDER — POLYETHYLENE GLYCOL 3350 17 G/17G
17 POWDER, FOR SOLUTION ORAL DAILY
Qty: 0 | Refills: 0 | Status: DISCONTINUED | OUTPATIENT
Start: 2017-09-23 | End: 2017-09-25

## 2017-09-23 RX ADMIN — PANTOPRAZOLE SODIUM 40 MILLIGRAM(S): 20 TABLET, DELAYED RELEASE ORAL at 05:53

## 2017-09-23 RX ADMIN — PANTOPRAZOLE SODIUM 40 MILLIGRAM(S): 20 TABLET, DELAYED RELEASE ORAL at 17:18

## 2017-09-23 RX ADMIN — Medication 1 DROP(S): at 05:53

## 2017-09-23 RX ADMIN — Medication 2: at 12:05

## 2017-09-23 RX ADMIN — Medication 1 DROP(S): at 17:19

## 2017-09-23 RX ADMIN — SIMETHICONE 80 MILLIGRAM(S): 80 TABLET, CHEWABLE ORAL at 22:16

## 2017-09-23 RX ADMIN — Medication 200 MILLIGRAM(S): at 12:05

## 2017-09-23 RX ADMIN — POLYETHYLENE GLYCOL 3350 17 GRAM(S): 17 POWDER, FOR SOLUTION ORAL at 17:17

## 2017-09-23 RX ADMIN — ATORVASTATIN CALCIUM 40 MILLIGRAM(S): 80 TABLET, FILM COATED ORAL at 22:15

## 2017-09-23 RX ADMIN — Medication 1: at 17:16

## 2017-09-23 RX ADMIN — Medication 1: at 22:15

## 2017-09-23 NOTE — PROGRESS NOTE ADULT - SUBJECTIVE AND OBJECTIVE BOX
PGY 1 Note discussed with supervising resident and primary attending    Patient is a 71y old  Male who presents with a chief complaint of Lower Gi bleed (22 Sep 2017 13:29)      INTERVAL HPI/OVERNIGHT EVENTS: Patient was seen and examined at bed side. No new complaints. patient is for dc plan on monday for subacute rehab.     MEDICATIONS  (STANDING):  atorvastatin 40 milliGRAM(s) Oral at bedtime  artificial  tears Solution 1 Drop(s) Both EYES two times a day  pantoprazole    Tablet 40 milliGRAM(s) Oral two times a day before meals  docusate sodium 200 milliGRAM(s) Oral daily  insulin lispro (HumaLOG) corrective regimen sliding scale   SubCutaneous Before meals and at bedtime  polyethylene glycol 3350 17 Gram(s) Oral daily    MEDICATIONS  (PRN):  simethicone 80 milliGRAM(s) Chew every 4 hours PRN Gas,indigestion  acetaminophen    Suspension. 650 milliGRAM(s) Oral every 6 hours PRN Moderate Pain (4 - 6)      __________________________________________________  REVIEW OF SYSTEMS:    CONSTITUTIONAL: No fever,   EYES: no acute visual disturbances  NECK: No pain or stiffness  RESPIRATORY: No cough; No shortness of breath  CARDIOVASCULAR: No chest pain, no palpitations  GASTROINTESTINAL: No pain. No nausea or vomiting; No diarrhea   NEUROLOGICAL: No headache or numbness, no tremors  MUSCULOSKELETAL: No joint pain, no muscle pain  GENITOURINARY: no dysuria, no frequency, no hesitancy  PSYCHIATRY: no depression , no anxiety  ALL OTHER  ROS negative        Vital Signs Last 24 Hrs  T(C): 37.1 (23 Sep 2017 16:07), Max: 37.2 (22 Sep 2017 23:36)  T(F): 98.7 (23 Sep 2017 16:07), Max: 99 (22 Sep 2017 23:36)  HR: 75 (23 Sep 2017 16:07) (66 - 75)  BP: 120/69 (23 Sep 2017 16:07) (111/68 - 120/69)  BP(mean): --  RR: 16 (23 Sep 2017 16:07) (16 - 16)  SpO2: 99% (23 Sep 2017 16:07) (98% - 100%)    ________________________________________________  PHYSICAL EXAM:  GENERAL: NAD  HEENT: Normocephalic;  conjunctivae and sclerae clear; moist mucous membranes;   NECK : supple  CHEST/LUNG: Clear to auscultation bilaterally with good air entry   HEART: S1 S2  regular; no murmurs, gallops or rubs  ABDOMEN: Soft, Nontender, Nondistended; Bowel sounds present  EXTREMITIES: no cyanosis; no edema; no calf tenderness  SKIN: warm and dry; no rash  NERVOUS SYSTEM:  Awake and alert; Oriented  to place, person and time ; no new deficits    _________________________________________________  LABS:                        9.9    10.4  )-----------( 369      ( 23 Sep 2017 08:11 )             28.5     09-23    140  |  108  |  16  ----------------------------<  148<H>  4.5   |  26  |  1.11    Ca    8.8      23 Sep 2017 08:11  Phos  3.1     09-22  Mg     1.9     09-22    TPro  5.9<L>  /  Alb  2.7<L>  /  TBili  0.3  /  DBili  x   /  AST  44<H>  /  ALT  66<H>  /  AlkPhos  61  09-22        CAPILLARY BLOOD GLUCOSE  195 (23 Sep 2017 16:53)  209 (23 Sep 2017 11:33)  150 (23 Sep 2017 08:11)  205 (22 Sep 2017 20:58)            RADIOLOGY & ADDITIONAL TESTS:    Imaging Personally Reviewed:  YES/NO    Consultant(s) Notes Reviewed:   YES/ No    Care Discussed with Consultants :     Plan of care was discussed with patient and /or primary care giver; all questions and concerns were addressed and care was aligned with patient's wishes.

## 2017-09-23 NOTE — PROGRESS NOTE ADULT - ASSESSMENT
71M from home, still works PMH of DM, HTN, Kidney stones, HLD who presents to hospital for syncope, possible seizure while on the way to work and episode of bloody stool incontinence.  Pt had 2 RRTs today for 2 episodes of bright red bloody bowel movement and hypotension to 80s, s/p 3 litre boluses and 1 unit pRBC being given. ICU accepted the pt as he is hemodynamically unstable sec to active Lower GI bleed.   On floor patient was comfortable. diet for advanced. patient is for sub acute rehab.     1)  Hypovolemic shock 2/2 LGI bleed.    Resolved. No more bleeds   s/p code fusion. s/p total 8PRBC 4FFP  Lower GI bleed-possible diverticulosis bleed with CT scan questionable diverticulitis without relevant physical exam finding(LLQ tenderness). Also with hemorrhagic shock and symptomatic anemia from blood loss.  -Diet advanced to regular food.as per GI and surgery suggested no acute intervention.  off anticogulants. check with cardiology if need any anticoagulation on discharge. likely discharge on aspirin.   -CTA -negative for source of bleed was concern for cecal diverticulitis.   -NGT with no evident of upper GI bleed. Bleeding scan negative.   -[place] and cipro completed  -colonoscopy shows no bleeding site in the left colon, likely bleed could be in right colon.  -Currently the Hb is stable  -no role for IR angiography as bleeding scan and CTA are more sensitive and negative.   Physical therapy evaluated patient and suggested sub acute rehab.    2) Diabetes.  Plan: HSS and accu check.   A1C 7.0.       3) Need for prophylactic measure.  no chemical AC due to recent bleed.   on Protonix BID IV.

## 2017-09-23 NOTE — PROGRESS NOTE ADULT - SUBJECTIVE AND OBJECTIVE BOX
HPI:  71M from home, still works PMH of DM, HTN, Kidney stones, HLD who presents to hospital for syncope, possible seizure while on the way to work and episode of bloody stool incontinence. Patient says he was on his way to work in the morning, on the train going to [place] and when he was getting off, he suddenly blacked out. As per friend who was with him, he was unconscious for 10 mins and when he woke up, he was very dizzy and had episode of stool incontinence that was very bloody. His friend told him that he had no convulsions, no period of confusion when he woke up, no tongue biting but that he was very sweaty. Patient denies any chest pain before, during or after the event. Friend got him to work, gave him change of clothes and then took him here to the hospital. (09 Sep 2017 17:45)      Patient is a 71y old  Male who presents with a chief complaint of Lower Gi bleed (22 Sep 2017 13:29)      INTERVAL HPI/OVERNIGHT EVENTS:  T(C): 37.1 (09-23-17 @ 16:07), Max: 37.2 (09-22-17 @ 23:36)  HR: 75 (09-23-17 @ 16:07) (66 - 75)  BP: 120/69 (09-23-17 @ 16:07) (111/68 - 120/69)  RR: 16 (09-23-17 @ 16:07) (16 - 16)  SpO2: 99% (09-23-17 @ 16:07) (98% - 100%)  Wt(kg): --  I&O's Summary      REVIEW OF SYSTEMS: denies fever, chills, SOB, palpitations, chest pain, abdominal pain, nausea, vomitting, diarrhea, constipation, dizziness    MEDICATIONS  (STANDING):  atorvastatin 40 milliGRAM(s) Oral at bedtime  artificial  tears Solution 1 Drop(s) Both EYES two times a day  pantoprazole    Tablet 40 milliGRAM(s) Oral two times a day before meals  docusate sodium 200 milliGRAM(s) Oral daily  insulin lispro (HumaLOG) corrective regimen sliding scale   SubCutaneous Before meals and at bedtime    MEDICATIONS  (PRN):  simethicone 80 milliGRAM(s) Chew every 4 hours PRN Gas,indigestion  acetaminophen    Suspension. 650 milliGRAM(s) Oral every 6 hours PRN Moderate Pain (4 - 6)      PHYSICAL EXAM:  GENERAL: NAD, well-groomed, well-developed  HEAD:  Atraumatic, Normocephalic  EYES: EOMI, PERRLA, conjunctiva and sclera clear  ENMT: No tonsillar erythema, exudates, or enlargement; Moist mucous membranes, Good dentition, No lesions  NECK: Supple, No JVD, Normal thyroid  NERVOUS SYSTEM:  Alert & Oriented X3, Good concentration; Motor Strength 5/5 B/L upper and lower extremities; DTRs 2+ intact and symmetric  CHEST/LUNG: Clear to percussion bilaterally; No rales, rhonchi, wheezing, or rubs  HEART: Regular rate and rhythm; No murmurs, rubs, or gallops  ABDOMEN: Soft, Nontender, Nondistended; Bowel sounds present  EXTREMITIES:  2+ Peripheral Pulses, No clubbing, cyanosis, or edema  LYMPH: No lymphadenopathy noted  SKIN: No rashes or lesions  LABS:                        9.9    10.4  )-----------( 369      ( 23 Sep 2017 08:11 )             28.5     09-23    140  |  108  |  16  ----------------------------<  148<H>  4.5   |  26  |  1.11    Ca    8.8      23 Sep 2017 08:11  Phos  3.1     09-22  Mg     1.9     09-22    TPro  5.9<L>  /  Alb  2.7<L>  /  TBili  0.3  /  DBili  x   /  AST  44<H>  /  ALT  66<H>  /  AlkPhos  61  09-22        CAPILLARY BLOOD GLUCOSE  209 (23 Sep 2017 11:33)  150 (23 Sep 2017 08:11)  205 (22 Sep 2017 20:58)

## 2017-09-24 LAB
ANION GAP SERPL CALC-SCNC: 6 MMOL/L — SIGNIFICANT CHANGE UP (ref 5–17)
BUN SERPL-MCNC: 19 MG/DL — HIGH (ref 7–18)
CALCIUM SERPL-MCNC: 9.3 MG/DL — SIGNIFICANT CHANGE UP (ref 8.4–10.5)
CHLORIDE SERPL-SCNC: 107 MMOL/L — SIGNIFICANT CHANGE UP (ref 96–108)
CO2 SERPL-SCNC: 27 MMOL/L — SIGNIFICANT CHANGE UP (ref 22–31)
CREAT SERPL-MCNC: 1.1 MG/DL — SIGNIFICANT CHANGE UP (ref 0.5–1.3)
GLUCOSE SERPL-MCNC: 167 MG/DL — HIGH (ref 70–99)
HCT VFR BLD CALC: 30.5 % — LOW (ref 39–50)
HGB BLD-MCNC: 10.6 G/DL — LOW (ref 13–17)
MCHC RBC-ENTMCNC: 30.7 PG — SIGNIFICANT CHANGE UP (ref 27–34)
MCHC RBC-ENTMCNC: 34.6 GM/DL — SIGNIFICANT CHANGE UP (ref 32–36)
MCV RBC AUTO: 88.7 FL — SIGNIFICANT CHANGE UP (ref 80–100)
PLATELET # BLD AUTO: 396 K/UL — SIGNIFICANT CHANGE UP (ref 150–400)
POTASSIUM SERPL-MCNC: 4.7 MMOL/L — SIGNIFICANT CHANGE UP (ref 3.5–5.3)
POTASSIUM SERPL-SCNC: 4.7 MMOL/L — SIGNIFICANT CHANGE UP (ref 3.5–5.3)
RBC # BLD: 3.44 M/UL — LOW (ref 4.2–5.8)
RBC # FLD: 13.1 % — SIGNIFICANT CHANGE UP (ref 10.3–14.5)
SODIUM SERPL-SCNC: 140 MMOL/L — SIGNIFICANT CHANGE UP (ref 135–145)
WBC # BLD: 8.9 K/UL — SIGNIFICANT CHANGE UP (ref 3.8–10.5)
WBC # FLD AUTO: 8.9 K/UL — SIGNIFICANT CHANGE UP (ref 3.8–10.5)

## 2017-09-24 RX ORDER — LACTULOSE 10 G/15ML
15 SOLUTION ORAL
Qty: 0 | Refills: 0 | Status: DISCONTINUED | OUTPATIENT
Start: 2017-09-24 | End: 2017-09-24

## 2017-09-24 RX ORDER — LACTULOSE 10 G/15ML
30 SOLUTION ORAL ONCE
Qty: 0 | Refills: 0 | Status: COMPLETED | OUTPATIENT
Start: 2017-09-24 | End: 2017-09-24

## 2017-09-24 RX ADMIN — ATORVASTATIN CALCIUM 40 MILLIGRAM(S): 80 TABLET, FILM COATED ORAL at 21:51

## 2017-09-24 RX ADMIN — Medication 1: at 08:33

## 2017-09-24 RX ADMIN — Medication 1 DROP(S): at 17:21

## 2017-09-24 RX ADMIN — LACTULOSE 30 GRAM(S): 10 SOLUTION ORAL at 17:21

## 2017-09-24 RX ADMIN — Medication 1 DROP(S): at 06:56

## 2017-09-24 RX ADMIN — Medication 3: at 11:57

## 2017-09-24 RX ADMIN — Medication 4: at 17:20

## 2017-09-24 RX ADMIN — PANTOPRAZOLE SODIUM 40 MILLIGRAM(S): 20 TABLET, DELAYED RELEASE ORAL at 06:56

## 2017-09-24 RX ADMIN — Medication 3: at 21:50

## 2017-09-24 RX ADMIN — PANTOPRAZOLE SODIUM 40 MILLIGRAM(S): 20 TABLET, DELAYED RELEASE ORAL at 17:20

## 2017-09-24 RX ADMIN — Medication 100 MILLIGRAM(S): at 11:57

## 2017-09-24 RX ADMIN — POLYETHYLENE GLYCOL 3350 17 GRAM(S): 17 POWDER, FOR SOLUTION ORAL at 13:27

## 2017-09-24 NOTE — PROGRESS NOTE ADULT - NEGATIVE PSYCHIATRIC SYMPTOMS
no suicidal ideation/no depression/no anxiety
no anxiety/no suicidal ideation/no depression
no anxiety/no suicidal ideation/no depression
no depression/no suicidal ideation/no anxiety

## 2017-09-24 NOTE — PROGRESS NOTE ADULT - NEGATIVE RESPIRATORY AND THORAX SYMPTOMS
no cough/no pleuritic chest pain/no wheezing/no hemoptysis/no dyspnea
no wheezing/no cough/no pleuritic chest pain/no hemoptysis/no dyspnea
no wheezing/no dyspnea/no cough/no pleuritic chest pain/no hemoptysis
no wheezing/no dyspnea/no cough/no hemoptysis/no pleuritic chest pain
no pleuritic chest pain/no wheezing/no dyspnea/no cough/no hemoptysis
no dyspnea/no hemoptysis/no wheezing/no cough/no pleuritic chest pain

## 2017-09-24 NOTE — PROGRESS NOTE ADULT - ASSESSMENT
1. Diverticular bleeding (stopped)  2. Anemia (H/H stable)  3. Diverticulitis (improving)    Suggestions:    1. Continue antibiotics as per ID  2. Monitor H/H  3. Protonix daily  4. Repeat colonoscopy out patient in 6-8 weeks  5. Avoid NSAID's  6. DVT prophylaxis

## 2017-09-24 NOTE — PROGRESS NOTE ADULT - LYMPH NODES
No lymphadedenopathy

## 2017-09-24 NOTE — PROGRESS NOTE ADULT - NEUROLOGICAL DETAILS
responds to pain/alert and oriented x 3/sensation intact/cranial nerves intact/responds to verbal commands
responds to pain/responds to verbal commands/cranial nerves intact
sensation intact/cranial nerves intact/responds to pain/responds to verbal commands
cranial nerves intact/alert and oriented x 3/sensation intact/responds to pain/responds to verbal commands
responds to verbal commands/cranial nerves intact/alert and oriented x 3/responds to pain/sensation intact
responds to pain/alert and oriented x 3/responds to verbal commands/sensation intact/cranial nerves intact
cranial nerves intact/responds to verbal commands/responds to pain
responds to verbal commands/cranial nerves intact/sensation intact/alert and oriented x 3/responds to pain

## 2017-09-24 NOTE — PROGRESS NOTE ADULT - NEGATIVE HEMATOLOGY SYMPTOMS
no gum bleeding/no nose bleeding
no nose bleeding/no gum bleeding
no nose bleeding/no gum bleeding
no gum bleeding/no nose bleeding

## 2017-09-24 NOTE — PROGRESS NOTE ADULT - GASTROINTESTINAL DETAILS
no guarding/soft/no distention/no rebound tenderness/bowel sounds normal/no rigidity
no guarding/no rebound tenderness/no rigidity/nontender/soft/no distention/bowel sounds normal
no distention/bowel sounds normal/no rebound tenderness/soft/nontender/no rigidity/no guarding
no guarding/bowel sounds normal/soft/no rebound tenderness/no rigidity/no distention
no rebound tenderness/no distention/soft/no guarding/bowel sounds normal/nontender/no rigidity
no guarding/nontender/bowel sounds normal/no rigidity/soft/no rebound tenderness/no distention
no bruit/no rebound tenderness/nontender/no distention/no guarding/soft
no distention/soft/no rebound tenderness/no rigidity/bowel sounds normal/nontender/no guarding

## 2017-09-24 NOTE — PROGRESS NOTE ADULT - RS GEN PE MLT RESP DETAILS PC
no wheezes/airway patent/breath sounds equal/no rales/clear to auscultation bilaterally/good air movement/no rhonchi
no rhonchi/no wheezes/breath sounds equal/airway patent/clear to auscultation bilaterally/good air movement/no rales
no rhonchi/no rales/no wheezes/respirations non-labored/airway patent/breath sounds equal/good air movement
no rhonchi/good air movement/no chest wall tenderness/no intercostal retractions/no wheezes/airway patent/respirations non-labored/no rales/clear to auscultation bilaterally/breath sounds equal
airway patent/no wheezes/clear to auscultation bilaterally/no rhonchi/no rales/respirations non-labored/breath sounds equal/good air movement
no rhonchi/breath sounds equal/airway patent/no rales/no wheezes/good air movement
no rhonchi/breath sounds equal/good air movement/airway patent
no rhonchi/good air movement/clear to auscultation bilaterally/no wheezes/airway patent/breath sounds equal/no rales

## 2017-09-24 NOTE — PROGRESS NOTE ADULT - CV[facility] HE PE MLT D E PC
no rub/regular rate and rhythm
regular rate and rhythm/no rub
no rub/regular rate and rhythm
regular rate and rhythm

## 2017-09-24 NOTE — PROGRESS NOTE ADULT - NEGATIVE GENERAL GENITOURINARY SYMPTOMS
no hematuria/no renal colic
no hematuria/no renal colic
no renal colic/no hematuria
no hematuria/no renal colic

## 2017-09-24 NOTE — PROGRESS NOTE ADULT - NEGATIVE ENMT SYMPTOMS
no dysphagia/no ear pain/no throat pain/no nose bleeds/no gum bleeding/no dry mouth/no hearing difficulty
no ear pain/no gum bleeding/no dry mouth/no nose bleeds/no throat pain/no dysphagia/no hearing difficulty
no throat pain/no gum bleeding/no hearing difficulty/no ear pain/no nose bleeds/no dry mouth/no dysphagia
no dry mouth/no hearing difficulty/no gum bleeding/no throat pain/no dysphagia/no ear pain
no dysphagia/no ear pain/no hearing difficulty/no dry mouth/no nose bleeds/no gum bleeding/no throat pain
no ear pain/no nose bleeds/no hearing difficulty/no abnormal taste sensation/no dry mouth/no nasal congestion/no throat pain/no dysphagia

## 2017-09-24 NOTE — PROGRESS NOTE ADULT - NEGATIVE GASTROINTESTINAL SYMPTOMS
no melena/no jaundice/no vomiting/no nausea/no change in bowel habits/no abdominal pain/no hematochezia/no diarrhea
no diarrhea/no nausea/no vomiting/no hematochezia/no abdominal pain/no melena/no jaundice
no nausea/no vomiting/no diarrhea/no jaundice/no hematochezia/no abdominal pain
no vomiting/no diarrhea/no nausea/no jaundice/no hematochezia/no abdominal pain/no melena
no jaundice/no abdominal pain/no melena/no hematochezia/no nausea/no vomiting/no diarrhea
no hematochezia/no jaundice/no melena/no nausea/no vomiting

## 2017-09-24 NOTE — PROGRESS NOTE ADULT - NEGATIVE ENDOCRINE SYMPTOMS
no heat intolerance/no cold intolerance
no cold intolerance
no cold intolerance/no heat intolerance
no heat intolerance/no cold intolerance
no heat intolerance/no cold intolerance

## 2017-09-24 NOTE — PROGRESS NOTE ADULT - NEGATIVE SKIN SYMPTOMS
no itching/no dryness/no rash
no itching/no rash/no dryness
no rash/no itching/no dryness
no itching/no dryness/no rash
no rash/no dryness/no itching
no rash/no itching

## 2017-09-24 NOTE — PROGRESS NOTE ADULT - NEGATIVE NEUROLOGICAL SYMPTOMS
no tremors/no vertigo/no headache/no syncope
no syncope/no tremors/no vertigo/no headache/no confusion
no loss of sensation/no syncope/no tremors/no headache/no vertigo
no syncope/no loss of sensation/no headache/no vertigo/no tremors
no tremors/no syncope/no vertigo/no transient paralysis
no headache/no syncope/no vertigo/no tremors

## 2017-09-24 NOTE — PROGRESS NOTE ADULT - GENERAL
Patient presents today for suprapubic tube change. Immediately patient reports having chest irregularities. He reports that his heart feels like it is rolling and his left arm has gone numb. Patient oriented to time and place, no facial drooping or slurring of words. Chest discomfort is intermittent but felt as if it was getting worse.This began yesterday bp 106/84 pulse 79. Patient does have a cardiac history and a rescue squad was called. Patient transferred to nearest e. via ambulance.    Suprapubic tube not changed. Patient aware that he will come back as soon as he feels better to change.  
details…

## 2017-09-24 NOTE — PROGRESS NOTE ADULT - NEGATIVE GENERAL SYMPTOMS
no chills/no fever/no sweating
no chills/no fever/no sweating
no fever/no sweating/no chills
no chills/no sweating/no fever
no fever/no chills/no sweating
no fever/no chills

## 2017-09-24 NOTE — PROGRESS NOTE ADULT - ASSESSMENT
diverticular bleed  s/p blood transfusion.  s/p colonoscopy doing ok.    constipation  will give lactulose

## 2017-09-24 NOTE — PROGRESS NOTE ADULT - CARDIOVASCULAR DETAILS
positive S1/positive S2
positive S2/positive S1

## 2017-09-24 NOTE — PROGRESS NOTE ADULT - NEGATIVE CARDIOVASCULAR SYMPTOMS
no dyspnea on exertion/no peripheral edema/no orthopnea/no paroxysmal nocturnal dyspnea/no claudication/no palpitations/no chest pain
no chest pain/no peripheral edema/no palpitations/no paroxysmal nocturnal dyspnea/no claudication
no chest pain/no peripheral edema/no paroxysmal nocturnal dyspnea/no palpitations/no orthopnea
no peripheral edema/no orthopnea/no claudication/no paroxysmal nocturnal dyspnea/no palpitations/no chest pain
no palpitations/no chest pain/no orthopnea/no paroxysmal nocturnal dyspnea/no peripheral edema/no claudication
no claudication/no chest pain/no paroxysmal nocturnal dyspnea/no orthopnea/no palpitations/no peripheral edema

## 2017-09-24 NOTE — PROGRESS NOTE ADULT - CONSTITUTIONAL DETAILS
well-groomed/no distress/well-developed
no distress/well-groomed/well-developed
well-groomed/well-developed/well-nourished
no distress/well-nourished/well-groomed/well-developed
well-developed/well-groomed/well-nourished/no distress
no distress/well-nourished/well-developed/well-groomed
well-developed/no distress/well-groomed
well-developed/well-nourished/well-groomed

## 2017-09-24 NOTE — PROGRESS NOTE ADULT - NECK DETAILS
supple/no JVD
no JVD/supple
no JVD/supple
supple/no JVD
supple/no JVD

## 2017-09-24 NOTE — PROGRESS NOTE ADULT - NSHPATTENDINGPLANDISCUSS_GEN_ALL_CORE
house staff covering
Dr Jolly
icu team and son at bedside
Dr Jolly
house staff covering

## 2017-09-24 NOTE — PROGRESS NOTE ADULT - MOUTH
normal mouth and gums/moist
moist/normal mouth and gums
normal mouth and gums/moist
normal mouth and gums/moist/dry

## 2017-09-24 NOTE — PROGRESS NOTE ADULT - SUBJECTIVE AND OBJECTIVE BOX
CHIEF COMPLAINT:Patient is a 71y old  Male who presents with a chief complaint of Lower Gi bleed (22 Sep 2017 13:29)  no furthur bleed  but no bm , but no abd pain , no nausea or vomiting. passing fltus  	          REVIEW OF SYSTEMS:  CONSTITUTIONAL: No fever, weight loss, or fatigue  EYES: No eye pain, visual disturbances, or discharge  NECK: No pain or stiffness  RESPIRATORY: No cough, wheezing, chills or hemoptysis; No Shortness of Breath  CARDIOVASCULAR: No chest pain, palpitations, passing out, dizziness, or leg swelling  GASTROINTESTINAL: No abdominal or epigastric pain. No nausea, vomiting, or hematemesis; No diarrhea or constipation. No melena or hematochezia.   GENITOURINARY: No dysuria, frequency, hematuria, or incontinence  NEUROLOGICAL: No headaches, memory loss, loss of strength, numbness, or tremors  SKIN: No itching, burning, rashes, or lesions   LYMPH Nodes: No enlarged glands  ENDOCRINE: No heat or cold intolerance; No hair loss  MUSCULOSKELETAL: No joint pain or swelling; No muscle, back, or extremity pain    Medications:  MEDICATIONS  (STANDING):  atorvastatin 40 milliGRAM(s) Oral at bedtime  artificial  tears Solution 1 Drop(s) Both EYES two times a day  pantoprazole    Tablet 40 milliGRAM(s) Oral two times a day before meals  docusate sodium 200 milliGRAM(s) Oral daily  insulin lispro (HumaLOG) corrective regimen sliding scale   SubCutaneous Before meals and at bedtime  polyethylene glycol 3350 17 Gram(s) Oral daily    MEDICATIONS  (PRN):  simethicone 80 milliGRAM(s) Chew every 4 hours PRN Gas,indigestion  acetaminophen    Suspension. 650 milliGRAM(s) Oral every 6 hours PRN Moderate Pain (4 - 6)  lactulose Syrup 15 Gram(s) Oral two times a day PRN constipation    	    PHYSICAL EXAM:  T(C): 36.9 (09-24-17 @ 15:30), Max: 37.1 (09-23-17 @ 23:55)  HR: 84 (09-24-17 @ 15:30) (65 - 85)  BP: 124/60 (09-24-17 @ 15:30) (105/63 - 124/60)  RR: 17 (09-24-17 @ 15:30) (15 - 17)  SpO2: 100% (09-24-17 @ 15:30) (99% - 100%)  Wt(kg): --  I&O's Summary      Appearance: Normal	  HEENT:   Normal oral mucosa, PERRL, EOMI	  Lymphatic: No lymphadenopathy  Cardiovascular: Normal S1 S2, No JVD, No murmurs, No edema  Respiratory: Lungs clear to auscultation	  Psychiatry: A & O x 3, Mood & affect appropriate  Gastrointestinal:  Soft, Non-tender, + BS	non distended  Skin: No rashes, No ecchymoses, No cyanosis	  Neurologic: Non-focal  Extremities: Normal range of motion, No clubbing, cyanosis or edema  Vascular: Peripheral pulses palpable 2+ bilaterally    TELEMETRY: 	    ECG:  	  RADIOLOGY:  OTHER: 	  	  LABS:	 	    CARDIAC MARKERS:                                10.6   8.9   )-----------( 396      ( 24 Sep 2017 06:47 )             30.5     09-24    140  |  107  |  19<H>  ----------------------------<  167<H>  4.7   |  27  |  1.10    Ca    9.3      24 Sep 2017 06:47      proBNP:   Lipid Profile:   HgA1c:   TSH:

## 2017-09-24 NOTE — PROGRESS NOTE ADULT - SUBJECTIVE AND OBJECTIVE BOX
[   ] ICU            [   ] CCU           [  X ] Medical Floor      Patient is comfortable. No new complaints reported, No abdominal pain, N/V, hematemesis, hematochezia, melena, fever, chills, chest pain, SOB, cough or diarrhea reported.    VITALS  Vital Signs Last 24 Hrs  T(C): 36.9 (24 Sep 2017 07:25), Max: 37.1 (23 Sep 2017 16:07)  T(F): 98.4 (24 Sep 2017 07:25), Max: 98.8 (23 Sep 2017 23:55)  HR: 65 (24 Sep 2017 07:25) (65 - 85)  BP: 108/63 (24 Sep 2017 07:25) (105/63 - 120/69)   RR: 16 (24 Sep 2017 07:25) (15 - 16)  SpO2: 99% (24 Sep 2017 07:25) (99% - 99%)         MEDICATIONS  (STANDING):  atorvastatin 40 milliGRAM(s) Oral at bedtime  artificial  tears Solution 1 Drop(s) Both EYES two times a day  pantoprazole    Tablet 40 milliGRAM(s) Oral two times a day before meals  docusate sodium 200 milliGRAM(s) Oral daily  insulin lispro (HumaLOG) corrective regimen sliding scale   SubCutaneous Before meals and at bedtime  polyethylene glycol 3350 17 Gram(s) Oral daily    MEDICATIONS  (PRN):  simethicone 80 milliGRAM(s) Chew every 4 hours PRN Gas,indigestion  acetaminophen    Suspension. 650 milliGRAM(s) Oral every 6 hours PRN Moderate Pain (4 - 6)                            10.6   8.9   )-----------( 396      ( 24 Sep 2017 06:47 )             30.5       09-24    140  |  107  |  19<H>  ----------------------------<  167<H>  4.7   |  27  |  1.10    Ca    9.3      24 Sep 2017 06:47

## 2017-09-24 NOTE — PROGRESS NOTE ADULT - NEGATIVE OPHTHALMOLOGIC SYMPTOMS
no diplopia
no diplopia/no photophobia
no photophobia/no diplopia
no diplopia/no photophobia

## 2017-09-24 NOTE — PROGRESS NOTE ADULT - NEGATIVE MUSCULOSKELETAL SYMPTOMS
no stiffness/no arthralgia/no muscle cramps/no neck pain
no arthralgia/no myalgia/no muscle cramps/no stiffness/no neck pain
no arthralgia/no stiffness/no neck pain
no neck pain/no arthralgia/no stiffness/no muscle cramps
no arthralgia/no neck pain/no stiffness
no arthralgia/no neck pain/no myalgia/no muscle cramps/no stiffness

## 2017-09-24 NOTE — PROGRESS NOTE ADULT - MS EXT PE MLT D E PC
no clubbing/no cyanosis/no pedal edema
no clubbing/no pedal edema/no cyanosis
no cyanosis/no pedal edema/no clubbing
no pedal edema/no cyanosis/no clubbing
no pedal edema/no clubbing/no cyanosis
no pedal edema/no clubbing/no cyanosis
no clubbing/no cyanosis/no pedal edema
no cyanosis/no pedal edema/no clubbing

## 2017-09-25 VITALS
OXYGEN SATURATION: 99 % | DIASTOLIC BLOOD PRESSURE: 66 MMHG | SYSTOLIC BLOOD PRESSURE: 100 MMHG | RESPIRATION RATE: 16 BRPM | TEMPERATURE: 98 F | HEART RATE: 77 BPM

## 2017-09-25 RX ORDER — DOCUSATE SODIUM 100 MG
2 CAPSULE ORAL
Qty: 0 | Refills: 0 | COMMUNITY
Start: 2017-09-25

## 2017-09-25 RX ORDER — POLYETHYLENE GLYCOL 3350 17 G/17G
17 POWDER, FOR SOLUTION ORAL
Qty: 0 | Refills: 0 | COMMUNITY
Start: 2017-09-25

## 2017-09-25 RX ADMIN — Medication 1 DROP(S): at 06:22

## 2017-09-25 RX ADMIN — Medication 4: at 12:42

## 2017-09-25 RX ADMIN — PANTOPRAZOLE SODIUM 40 MILLIGRAM(S): 20 TABLET, DELAYED RELEASE ORAL at 06:22

## 2017-09-25 RX ADMIN — Medication 2: at 08:57

## 2017-09-25 NOTE — PROGRESS NOTE ADULT - PROVIDER SPECIALTY LIST ADULT
Critical Care
Gastroenterology
Infectious Disease
Internal Medicine
Intervent Radiology
MICU
Surgery
Gastroenterology
Internal Medicine
Critical Care
Gastroenterology

## 2017-09-25 NOTE — PROGRESS NOTE ADULT - ASSESSMENT
doing ok  no complaints  no dyzzyness when walks with family.    no rectal bleed  hgb stable  pt used to take meds for HTN, DM, HLD  Bp is on lower side but BS are high  restart janumet ( was on 50/1000 bid but start with 50/500 bid then increase the dose.  strict diet and walk around.  ( pt is not compliant to diet)    watch BP with PCP Periodically then if BP goes high then restart meds   same for statins.  f/u pcp, GI out pt   any recurrence of blood that may happen( small chance)  come to ER.

## 2017-09-25 NOTE — PROGRESS NOTE ADULT - SUBJECTIVE AND OBJECTIVE BOX
HPI:  71M from home, still works PMH of DM, HTN, Kidney stones, HLD who presents to hospital for syncope, possible seizure while on the way to work and episode of bloody stool incontinence. Patient says he was on his way to work in the morning, on the train going to [place] and when he was getting off, he suddenly blacked out. As per friend who was with him, he was unconscious for 10 mins and when he woke up, he was very dizzy and had episode of stool incontinence that was very bloody. His friend told him that he had no convulsions, no period of confusion when he woke up, no tongue biting but that he was very sweaty. Patient denies any chest pain before, during or after the event. Friend got him to work, gave him change of clothes and then took him here to the hospital. (09 Sep 2017 17:45)      Patient is a 71y old  Male who presents with a chief complaint of Lower Gi bleed (22 Sep 2017 13:29)      INTERVAL HPI/OVERNIGHT EVENTS:  T(C): 36.9 (09-25-17 @ 07:55), Max: 36.9 (09-24-17 @ 15:30)  HR: 77 (09-25-17 @ 07:55) (77 - 88)  BP: 100/66 (09-25-17 @ 07:55) (100/66 - 124/60)  RR: 16 (09-25-17 @ 07:55) (16 - 17)  SpO2: 99% (09-25-17 @ 07:55) (98% - 100%)  Wt(kg): --  I&O's Summary      REVIEW OF SYSTEMS: denies fever, chills, SOB, palpitations, chest pain, abdominal pain, nausea, vomitting, diarrhea, constipation, dizziness    MEDICATIONS  (STANDING):  atorvastatin 40 milliGRAM(s) Oral at bedtime  artificial  tears Solution 1 Drop(s) Both EYES two times a day  pantoprazole    Tablet 40 milliGRAM(s) Oral two times a day before meals  docusate sodium 200 milliGRAM(s) Oral daily  insulin lispro (HumaLOG) corrective regimen sliding scale   SubCutaneous Before meals and at bedtime  polyethylene glycol 3350 17 Gram(s) Oral daily    MEDICATIONS  (PRN):  simethicone 80 milliGRAM(s) Chew every 4 hours PRN Gas,indigestion  acetaminophen    Suspension. 650 milliGRAM(s) Oral every 6 hours PRN Moderate Pain (4 - 6)      PHYSICAL EXAM:  GENERAL: NAD, well-groomed, well-developed  HEAD:  Atraumatic, Normocephalic  EYES: EOMI, PERRLA, conjunctiva and sclera clear  ENMT: No tonsillar erythema, exudates, or enlargement; Moist mucous membranes, Good dentition, No lesions  NECK: Supple, No JVD, Normal thyroid  NERVOUS SYSTEM:  Alert & Oriented X3, Good concentration; Motor Strength 5/5 B/L upper and lower extremities; DTRs 2+ intact and symmetric  CHEST/LUNG: Clear to percussion bilaterally; No rales, rhonchi, wheezing, or rubs  HEART: Regular rate and rhythm; No murmurs, rubs, or gallops  ABDOMEN: Soft, Nontender, Nondistended; Bowel sounds present  EXTREMITIES:  2+ Peripheral Pulses, No clubbing, cyanosis, or edema  LYMPH: No lymphadenopathy noted  SKIN: No rashes or lesions  LABS:                        10.6   8.9   )-----------( 396      ( 24 Sep 2017 06:47 )             30.5     09-24    140  |  107  |  19<H>  ----------------------------<  167<H>  4.7   |  27  |  1.10    Ca    9.3      24 Sep 2017 06:47          CAPILLARY BLOOD GLUCOSE  327 (25 Sep 2017 12:00)  208 (25 Sep 2017 08:18)  253 (24 Sep 2017 20:43)  313 (24 Sep 2017 16:10)

## 2017-09-28 DIAGNOSIS — D50.0 IRON DEFICIENCY ANEMIA SECONDARY TO BLOOD LOSS (CHRONIC): ICD-10-CM

## 2017-09-28 DIAGNOSIS — Z79.82 LONG TERM (CURRENT) USE OF ASPIRIN: ICD-10-CM

## 2017-09-28 DIAGNOSIS — N20.0 CALCULUS OF KIDNEY: ICD-10-CM

## 2017-09-28 DIAGNOSIS — E78.4 OTHER HYPERLIPIDEMIA: ICD-10-CM

## 2017-09-28 DIAGNOSIS — K59.00 CONSTIPATION, UNSPECIFIED: ICD-10-CM

## 2017-09-28 DIAGNOSIS — E11.9 TYPE 2 DIABETES MELLITUS WITHOUT COMPLICATIONS: ICD-10-CM

## 2017-09-28 DIAGNOSIS — I95.9 HYPOTENSION, UNSPECIFIED: ICD-10-CM

## 2017-09-28 DIAGNOSIS — R57.1 HYPOVOLEMIC SHOCK: ICD-10-CM

## 2017-09-28 DIAGNOSIS — K57.31 DIVERTICULOSIS OF LARGE INTESTINE WITHOUT PERFORATION OR ABSCESS WITH BLEEDING: ICD-10-CM

## 2017-10-19 PROCEDURE — 86850 RBC ANTIBODY SCREEN: CPT

## 2017-10-19 PROCEDURE — 86901 BLOOD TYPING SEROLOGIC RH(D): CPT

## 2017-10-19 PROCEDURE — 81001 URINALYSIS AUTO W/SCOPE: CPT

## 2017-10-19 PROCEDURE — 85027 COMPLETE CBC AUTOMATED: CPT

## 2017-10-19 PROCEDURE — 82553 CREATINE MB FRACTION: CPT

## 2017-10-19 PROCEDURE — 70450 CT HEAD/BRAIN W/O DYE: CPT

## 2017-10-19 PROCEDURE — 97110 THERAPEUTIC EXERCISES: CPT

## 2017-10-19 PROCEDURE — 83550 IRON BINDING TEST: CPT

## 2017-10-19 PROCEDURE — 83605 ASSAY OF LACTIC ACID: CPT

## 2017-10-19 PROCEDURE — 87086 URINE CULTURE/COLONY COUNT: CPT

## 2017-10-19 PROCEDURE — 36430 TRANSFUSION BLD/BLD COMPNT: CPT

## 2017-10-19 PROCEDURE — 82550 ASSAY OF CK (CPK): CPT

## 2017-10-19 PROCEDURE — 86900 BLOOD TYPING SEROLOGIC ABO: CPT

## 2017-10-19 PROCEDURE — P9059: CPT

## 2017-10-19 PROCEDURE — 74174 CTA ABD&PLVS W/CONTRAST: CPT

## 2017-10-19 PROCEDURE — P9040: CPT

## 2017-10-19 PROCEDURE — 83735 ASSAY OF MAGNESIUM: CPT

## 2017-10-19 PROCEDURE — 85045 AUTOMATED RETICULOCYTE COUNT: CPT

## 2017-10-19 PROCEDURE — 80048 BASIC METABOLIC PNL TOTAL CA: CPT

## 2017-10-19 PROCEDURE — 93306 TTE W/DOPPLER COMPLETE: CPT

## 2017-10-19 PROCEDURE — A9560: CPT

## 2017-10-19 PROCEDURE — 85730 THROMBOPLASTIN TIME PARTIAL: CPT

## 2017-10-19 PROCEDURE — 84443 ASSAY THYROID STIM HORMONE: CPT

## 2017-10-19 PROCEDURE — 36415 COLL VENOUS BLD VENIPUNCTURE: CPT

## 2017-10-19 PROCEDURE — 82607 VITAMIN B-12: CPT

## 2017-10-19 PROCEDURE — 80053 COMPREHEN METABOLIC PANEL: CPT

## 2017-10-19 PROCEDURE — 78278 ACUTE GI BLOOD LOSS IMAGING: CPT

## 2017-10-19 PROCEDURE — 97116 GAIT TRAINING THERAPY: CPT

## 2017-10-19 PROCEDURE — 71045 X-RAY EXAM CHEST 1 VIEW: CPT

## 2017-10-19 PROCEDURE — 82746 ASSAY OF FOLIC ACID SERUM: CPT

## 2017-10-19 PROCEDURE — 84484 ASSAY OF TROPONIN QUANT: CPT

## 2017-10-19 PROCEDURE — 82272 OCCULT BLD FECES 1-3 TESTS: CPT

## 2017-10-19 PROCEDURE — 83615 LACTATE (LD) (LDH) ENZYME: CPT

## 2017-10-19 PROCEDURE — 82728 ASSAY OF FERRITIN: CPT

## 2017-10-19 PROCEDURE — 83036 HEMOGLOBIN GLYCOSYLATED A1C: CPT

## 2017-10-19 PROCEDURE — 71046 X-RAY EXAM CHEST 2 VIEWS: CPT

## 2017-10-19 PROCEDURE — 93005 ELECTROCARDIOGRAM TRACING: CPT

## 2017-10-19 PROCEDURE — 84100 ASSAY OF PHOSPHORUS: CPT

## 2017-10-19 PROCEDURE — 82803 BLOOD GASES ANY COMBINATION: CPT

## 2017-10-19 PROCEDURE — 99285 EMERGENCY DEPT VISIT HI MDM: CPT | Mod: 25

## 2017-10-19 PROCEDURE — 80061 LIPID PANEL: CPT

## 2017-10-19 PROCEDURE — 86920 COMPATIBILITY TEST SPIN: CPT

## 2017-10-19 PROCEDURE — 85610 PROTHROMBIN TIME: CPT

## 2018-08-04 NOTE — PROGRESS NOTE ADULT - SUBJECTIVE AND OBJECTIVE BOX
INTERVAL HPI/ OVERNIGHT EVENTS: s/p 2L bolus and 2U PRBC and patient is currently asymptomatic and vitals stable.       Other:  atorvastatin 40 milliGRAM(s) Oral at bedtime  pantoprazole  Injectable 40 milliGRAM(s) IV Push two times a day  dextrose 5% + sodium chloride 0.9%. 1000 milliLiter(s) IV Continuous <Continuous>  insulin lispro (HumaLOG) corrective regimen sliding scale   SubCutaneous every 6 hours    atorvastatin 40 milliGRAM(s) Oral at bedtime  pantoprazole  Injectable 40 milliGRAM(s) IV Push two times a day  dextrose 5% + sodium chloride 0.9%. 1000 milliLiter(s) IV Continuous <Continuous>  insulin lispro (HumaLOG) corrective regimen sliding scale   SubCutaneous every 6 hours    Drug Dosing Weight      CENTRAL LINE: [x ] YES [ ] NO  LOCATION:  [facility] DATE INSERTED: 9/10  REMOVE: [ ] YES [x ] NO  EXPLAIN:    [place]: [ ] YES [x ] NO    DATE INSERTED:  REMOVE:  [ ] YES [ ] NO  EXPLAIN:    A-LINE:  [ ] YES [x ] NO  LOCATION:   DATE INSERTED:  REMOVE:  [ ] YES [ ] NO  EXPLAIN:    PMH -reviewed admission note, no change since admission    ICU Vital Signs Last 24 Hrs  T(C): 36.7 (11 Sep 2017 05:00), Max: 37.1 (10 Sep 2017 12:02)  T(F): 98 (11 Sep 2017 05:00), Max: 98.8 (10 Sep 2017 12:02)  HR: 86 (11 Sep 2017 08:00) (70 - 110)  BP: 111/62 (11 Sep 2017 08:00) (95/56 - 129/76)  BP(mean): 73 (11 Sep 2017 08:00) (64 - 82)  ABP: --  ABP(mean): --  RR: 8 (11 Sep 2017 08:00) (8 - 24)  SpO2: 100% (11 Sep 2017 08:00) (96% - 100%)            09-10 @ 07:01  -   @ 07:00  --------------------------------------------------------  IN: 3600 mL / OUT: 820 mL / NET: 2780 mL            PHYSICAL EXAM:    CONSTITUTIONAL: No fever, weight loss, or fatigue  	EYES: No eye pain, visual disturbances, or discharge  	ENMT:  No difficulty hearing, tinnitus, vertigo; No sinus or throat pain  	NECK: No pain or stiffness  	RESPIRATORY: No cough, wheezing, chills or hemoptysis; No shortness of breath  	CARDIOVASCULAR: syncope, dizziness - no chest pain, no palpitations  	GASTROINTESTINAL: fecal incontinence with bloody bowel movement  	GENITOURINARY: No dysuria, frequency, hematuria, or incontinence  	NEUROLOGICAL: No headaches, memory loss, loss of strength, numbness, or tremors  	SKIN: No itching, burning, rashes, or lesions   	LYMPH NODES: No enlarged glands  	ENDOCRINE: No heat or cold intolerance; No hair loss  	MUSCULOSKELETAL: No joint pain or swelling; No muscle, back, or extremity pain  	PSYCHIATRIC: No depression, anxiety, mood swings, or difficulty sleeping  	HEME/LYMPH: No easy bruising, or bleeding gums  ALLERY AND IMMUNOLOGIC: No hives or eczema    LABS:  CBC Full  -  ( 11 Sep 2017 05:00 )  WBC Count : 12.7 K/uL  Hemoglobin : 8.7 g/dL  Hematocrit : 25.6 %  Platelet Count - Automated : 208 K/uL  Mean Cell Volume : 83.4 fl  Mean Cell Hemoglobin : 28.5 pg  Mean Cell Hemoglobin Concentration : 34.1 gm/dL  Auto Neutrophil # : 9.5 K/uL  Auto Lymphocyte # : 2.1 K/uL  Auto Monocyte # : 0.7 K/uL  Auto Eosinophil # : 0.2 K/uL  Auto Basophil # : 0.1 K/uL  Auto Neutrophil % : 75.0 %  Auto Lymphocyte % : 16.8 %  Auto Monocyte % : 5.2 %  Auto Eosinophil % : 1.8 %  Auto Basophil % : 1.2 %        141  |  111<H>  |  18  ----------------------------<  180<H>  4.3   |  23  |  1.02    Ca    7.1<L>      11 Sep 2017 05:00  Phos  2.7       Mg     1.5         TPro  4.6<L>  /  Alb  2.4<L>  /  TBili  1.2  /  DBili  x   /  AST  11  /  ALT  18  /  AlkPhos  47      PT/INR - ( 10 Sep 2017 20:51 )   PT: 13.4 sec;   INR: 1.22 ratio         PTT - ( 10 Sep 2017 20:51 )  PTT:24.9 sec  Urinalysis Basic - ( 09 Sep 2017 10:04 )    Color: Yellow / Appearance: Clear / S.025 / pH: x  Gluc: x / Ketone: Trace  / Bili: Negative / Urobili: Negative   Blood: x / Protein: 15 / Nitrite: Negative   Leuk Esterase: Negative / RBC: 0-2 /HPF / WBC 0-2 /HPF   Sq Epi: x / Non Sq Epi: x / Bacteria: Trace /HPF          [x ]GOALS OF CARE DISCUSSION WITH PATIENT/FAMILY/PROXY:    CRITICAL CARE TIME SPENT: 35 minutes Parent(s)/Self

## 2022-01-01 NOTE — PROGRESS NOTE ADULT - ASSESSMENT
71y.o. Male with resolving GI bleed ,[email or web address]

## 2023-01-06 NOTE — PATIENT PROFILE ADULT. - EXTENSIONS OF SELF_ADULT
32-year-old male with history of alcohol abuse, pancreatitis, hypertension, presents with left upper quadrant abdominal pain radiating to the back, sharp, identical to that of past pancreatitis episodes, since Wednesday. Associated NBNB emesis. On review of systems reports some shortness of breath when he has pain only. Denies all other symptoms including chest pain, cough, fever, diarrhea. On exam, afebrile, hemodynamically stable, saturating well on room air, NAD, well appearing, sitting comfortably in chair, no WOB, speaking full sentences, head NCAT, EOMI grossly, anicteric, MMM, no JVD, RRR, nml S1/S2, no m/r/g, lungs CTAB, no w/r/r, abd soft, mild left upper quadrant TTP, ND, nml BS, no rebound or guarding, AAO, CN's 3-12 grossly intact, BRYAN spontaneously, no leg cyanosis or edema, mild intention tremor, 2+ symmetric radial pulses, skin warm, well perfused, no rashes or hives. Abdomen relatively benign. Noted concern for pancreatitis on CT and will admit. Character and appearance low suspicion for dissection and no murmur or pulse asymmetry. Character low suspicion for ACS and ECG/troponin unremarkable. Patient appears to be in mild alcohol withdrawal and given IV fluids and Librium. Patient well appearing, hemodynamically stable. Admitted to internal medicine for further monitoring, w/u, and care.
None

## 2023-03-14 NOTE — PROGRESS NOTE ADULT - SUBJECTIVE AND OBJECTIVE BOX
Patient here to see ENT for Recurrent ear infections  Lexi Myles LPN ..........3/14/2023 11:43 AM       HPI:  71M from home, still works PMH of DM, HTN, Kidney stones, HLD who presents to hospital for syncope, possible seizure while on the way to work and episode of bloody stool incontinence. Patient says he was on his way to work in the morning, on the train going to [place] and when he was getting off, he suddenly blacked out. As per friend who was with him, he was unconscious for 10 mins and when he woke up, he was very dizzy and had episode of stool incontinence that was very bloody. His friend told him that he had no convulsions, no period of confusion when he woke up, no tongue biting but that he was very sweaty. Patient denies any chest pain before, during or after the event. Friend got him to work, gave him change of clothes and then took him here to the hospital. (09 Sep 2017 17:45)      Patient is a 71y old  Male who presents with a chief complaint of Passing out, fecal incontinence, blood in stool (09 Sep 2017 17:45)  comfortable.   s/p colonoscopy  sigmoid diverticulosis but no active bleed.   no abd pain     INTERVAL HPI/OVERNIGHT EVENTS:  T(C): 36.9 (09-16-17 @ 10:00), Max: 37.1 (09-15-17 @ 19:18)  HR: 76 (09-16-17 @ 13:00) (61 - 82)  BP: 125/74 (09-16-17 @ 13:00) (103/62 - 138/70)  RR: 15 (09-16-17 @ 13:00) (10 - 18)  SpO2: 98% (09-16-17 @ 13:00) (96% - 100%)  Wt(kg): --  I&O's Summary    15 Sep 2017 07:01  -  16 Sep 2017 07:00  --------------------------------------------------------  IN: 1800 mL / OUT: 2350 mL / NET: -550 mL    16 Sep 2017 07:01  -  16 Sep 2017 13:30  --------------------------------------------------------  IN: 510 mL / OUT: 800 mL / NET: -290 mL        REVIEW OF SYSTEMS: denies fever, chills, SOB, palpitations, chest pain, abdominal pain, nausea, vomitting, diarrhea, constipation, dizziness    MEDICATIONS  (STANDING):  atorvastatin 40 milliGRAM(s) Oral at bedtime  pantoprazole  Injectable 40 milliGRAM(s) IV Push two times a day  insulin lispro (HumaLOG) corrective regimen sliding scale   SubCutaneous every 6 hours  ciprofloxacin   IVPB      ciprofloxacin   IVPB 400 milliGRAM(s) IV Intermittent every 12 hours  metroNIDAZOLE  IVPB      metroNIDAZOLE  IVPB 500 milliGRAM(s) IV Intermittent every 8 hours  artificial  tears Solution 1 Drop(s) Both EYES two times a day    MEDICATIONS  (PRN):  HYDROmorphone  Injectable 0.5 milliGRAM(s) IV Push every 4 hours PRN Moderate Pain (4 - 6)      PHYSICAL EXAM:  GENERAL: NAD, well-groomed, well-developed  HEAD:  Atraumatic, Normocephalic  EYES: EOMI, PERRLA, conjunctiva and sclera clear  ENMT: No tonsillar erythema, exudates, or enlargement; Moist mucous membranes, Good dentition, No lesions  NECK: Supple, No JVD, Normal thyroid  NERVOUS SYSTEM:  Alert & Oriented X3, Good concentration; Motor Strength 5/5 B/L upper and lower extremities; DTRs 2+ intact and symmetric  CHEST/LUNG: Clear to percussion bilaterally; No rales, rhonchi, wheezing, or rubs  HEART: Regular rate and rhythm; No murmurs, rubs, or gallops  ABDOMEN: Soft, Nontender, Nondistended; Bowel sounds present  EXTREMITIES:  2+ Peripheral Pulses, No clubbing, cyanosis, or edema  LYMPH: No lymphadenopathy noted  SKIN: No rashes or lesions  LABS:                        9.1    8.6   )-----------( 248      ( 16 Sep 2017 10:46 )             26.4     09-16    143  |  109<H>  |  10  ----------------------------<  138<H>  3.6   |  27  |  0.86    Ca    7.9<L>      16 Sep 2017 03:58  Phos  2.8     09-16  Mg     1.7     09-16    TPro  4.8<L>  /  Alb  2.2<L>  /  TBili  0.4  /  DBili  x   /  AST  20  /  ALT  26  /  AlkPhos  53  09-16    PT/INR - ( 14 Sep 2017 14:55 )   PT: 15.0 sec;   INR: 1.37 ratio         PTT - ( 14 Sep 2017 14:55 )  PTT:25.3 sec    CAPILLARY BLOOD GLUCOSE  187 (16 Sep 2017 12:00)  138 (16 Sep 2017 05:00)  125 (16 Sep 2017 00:00)  127 (15 Sep 2017 18:00)

## 2024-05-20 NOTE — PROGRESS NOTE ADULT - ASSESSMENT
Patient is a 71M from home with PMH of DM, HTN, Kidney stones, HLD who presents to hospital for syncope, possible seizure while on the way to work and episode of bloody stool incontinence. Admitted for syncope, seizure and lower GI bleed. Initiate Treatment: He will start Tretinoin  0.025% cream  nightly as tolerated once his skin begins to show signs of no Xerosis. Detail Level: Zone Render In Strict Bullet Format?: No Modify Regimen: Pt will take isotretinoin 30 mg once daily which is being decreased from last month’s dosage of 30 mg twice daily
